# Patient Record
Sex: FEMALE | Race: WHITE | Employment: OTHER | URBAN - METROPOLITAN AREA
[De-identification: names, ages, dates, MRNs, and addresses within clinical notes are randomized per-mention and may not be internally consistent; named-entity substitution may affect disease eponyms.]

---

## 2017-01-18 ENCOUNTER — HOSPITAL ENCOUNTER (OUTPATIENT)
Dept: LAB | Age: 82
Discharge: HOME | End: 2017-01-18
Attending: INTERNAL MEDICINE
Payer: MEDICARE

## 2017-01-18 DIAGNOSIS — E78.2 MIXED HYPERLIPIDEMIA: ICD-10-CM

## 2017-01-18 LAB
ALBUMIN SERPL BCP-MCNC: 3.8 G/DL (ref 3.2–4.6)
ALBUMIN/GLOB SERPL: 1 {RATIO}
ALP SERPL-CCNC: 51 U/L (ref 50–136)
ALT SERPL-CCNC: 22 U/L (ref 12–65)
AST SERPL W P-5'-P-CCNC: 15 U/L (ref 15–37)
BILIRUB DIRECT SERPL-MCNC: <0.1 MG/DL
BILIRUB SERPL-MCNC: 0.4 MG/DL (ref 0.2–1.1)
CHOLEST SERPL-MCNC: 115 MG/DL
GLOBULIN SER CALC-MCNC: 4 G/DL
HDLC SERPL-MCNC: 37 MG/DL (ref 40–60)
HDLC SERPL: 3.1 {RATIO}
LDLC SERPL CALC-MCNC: 31 MG/DL
LIPID PROFILE,FLP: ABNORMAL
PROT SERPL-MCNC: 7.8 G/DL (ref 6.3–8.2)
TRIGL SERPL-MCNC: 235 MG/DL (ref 35–150)
VLDLC SERPL CALC-MCNC: 47 MG/DL

## 2017-01-18 PROCEDURE — 80076 HEPATIC FUNCTION PANEL: CPT | Performed by: INTERNAL MEDICINE

## 2017-01-18 PROCEDURE — 80061 LIPID PANEL: CPT | Performed by: INTERNAL MEDICINE

## 2017-01-18 PROCEDURE — 36415 COLL VENOUS BLD VENIPUNCTURE: CPT | Performed by: INTERNAL MEDICINE

## 2017-08-21 ENCOUNTER — APPOINTMENT (OUTPATIENT)
Dept: GENERAL RADIOLOGY | Age: 82
End: 2017-08-21
Attending: EMERGENCY MEDICINE
Payer: MEDICARE

## 2017-08-21 ENCOUNTER — HOSPITAL ENCOUNTER (EMERGENCY)
Age: 82
Discharge: HOME | End: 2017-08-21
Attending: EMERGENCY MEDICINE
Payer: MEDICARE

## 2017-08-21 VITALS
HEIGHT: 63 IN | DIASTOLIC BLOOD PRESSURE: 58 MMHG | RESPIRATION RATE: 26 BRPM | WEIGHT: 143 LBS | OXYGEN SATURATION: 99 % | HEART RATE: 107 BPM | TEMPERATURE: 98.7 F | SYSTOLIC BLOOD PRESSURE: 104 MMHG | BODY MASS INDEX: 25.34 KG/M2

## 2017-08-21 DIAGNOSIS — J81.0 ACUTE PULMONARY EDEMA (HCC): Primary | ICD-10-CM

## 2017-08-21 DIAGNOSIS — E87.5 ACUTE HYPERKALEMIA: ICD-10-CM

## 2017-08-21 DIAGNOSIS — N18.9 CHRONIC RENAL FAILURE, UNSPECIFIED STAGE: ICD-10-CM

## 2017-08-21 DIAGNOSIS — I48.0 PAROXYSMAL ATRIAL FIBRILLATION (HCC): ICD-10-CM

## 2017-08-21 LAB
ANION GAP SERPL CALC-SCNC: 12 MMOL/L (ref 7–16)
ATRIAL RATE: 141 BPM
BASOPHILS # BLD: 0 K/UL (ref 0–0.2)
BASOPHILS NFR BLD: 0 % (ref 0–2)
BUN SERPL-MCNC: 45 MG/DL (ref 8–23)
CALCIUM SERPL-MCNC: 9.6 MG/DL (ref 8.3–10.4)
CALCULATED R AXIS, ECG10: 51 DEGREES
CALCULATED T AXIS, ECG11: -136 DEGREES
CHLORIDE SERPL-SCNC: 98 MMOL/L (ref 98–107)
CO2 SERPL-SCNC: 27 MMOL/L (ref 21–32)
CREAT SERPL-MCNC: 9.48 MG/DL (ref 0.6–1)
DIAGNOSIS, 93000: NORMAL
DIFFERENTIAL METHOD BLD: ABNORMAL
EOSINOPHIL # BLD: 0 K/UL (ref 0–0.8)
EOSINOPHIL NFR BLD: 1 % (ref 0.5–7.8)
ERYTHROCYTE [DISTWIDTH] IN BLOOD BY AUTOMATED COUNT: 17.9 % (ref 11.9–14.6)
GLUCOSE SERPL-MCNC: 133 MG/DL (ref 65–100)
HCT VFR BLD AUTO: 24.5 % (ref 35.8–46.3)
HGB BLD-MCNC: 8.1 G/DL (ref 11.7–15.4)
IMM GRANULOCYTES # BLD: 0 K/UL (ref 0–0.5)
IMM GRANULOCYTES NFR BLD: 0.7 % (ref 0–5)
LYMPHOCYTES # BLD: 1.4 K/UL (ref 0.5–4.6)
LYMPHOCYTES NFR BLD: 23 % (ref 13–44)
MAGNESIUM SERPL-MCNC: 2.1 MG/DL (ref 1.8–2.4)
MCH RBC QN AUTO: 36.7 PG (ref 26.1–32.9)
MCHC RBC AUTO-ENTMCNC: 33.1 G/DL (ref 31.4–35)
MCV RBC AUTO: 110.9 FL (ref 79.6–97.8)
MONOCYTES # BLD: 0.2 K/UL (ref 0.1–1.3)
MONOCYTES NFR BLD: 4 % (ref 4–12)
NEUTS SEG # BLD: 4.4 K/UL (ref 1.7–8.2)
NEUTS SEG NFR BLD: 71 % (ref 43–78)
PLATELET # BLD AUTO: 236 K/UL (ref 150–450)
PMV BLD AUTO: 10.2 FL (ref 10.8–14.1)
POTASSIUM SERPL-SCNC: 5.4 MMOL/L (ref 3.5–5.1)
Q-T INTERVAL, ECG07: 306 MS
QRS DURATION, ECG06: 64 MS
QTC CALCULATION (BEZET), ECG08: 402 MS
RBC # BLD AUTO: 2.21 M/UL (ref 4.05–5.25)
SODIUM SERPL-SCNC: 137 MMOL/L (ref 136–145)
TROPONIN I BLD-MCNC: 0.16 NG/ML (ref 0–0.08)
VENTRICULAR RATE, ECG03: 104 BPM
WBC # BLD AUTO: 6.2 K/UL (ref 4.3–11.1)

## 2017-08-21 PROCEDURE — 93005 ELECTROCARDIOGRAM TRACING: CPT | Performed by: EMERGENCY MEDICINE

## 2017-08-21 PROCEDURE — 99284 EMERGENCY DEPT VISIT MOD MDM: CPT | Performed by: EMERGENCY MEDICINE

## 2017-08-21 PROCEDURE — 85025 COMPLETE CBC W/AUTO DIFF WBC: CPT | Performed by: EMERGENCY MEDICINE

## 2017-08-21 PROCEDURE — 74011000250 HC RX REV CODE- 250: Performed by: EMERGENCY MEDICINE

## 2017-08-21 PROCEDURE — 80048 BASIC METABOLIC PNL TOTAL CA: CPT | Performed by: EMERGENCY MEDICINE

## 2017-08-21 PROCEDURE — 83735 ASSAY OF MAGNESIUM: CPT | Performed by: EMERGENCY MEDICINE

## 2017-08-21 PROCEDURE — 71010 XR CHEST PORT: CPT

## 2017-08-21 PROCEDURE — 84484 ASSAY OF TROPONIN QUANT: CPT

## 2017-08-21 RX ORDER — LIDOCAINE AND PRILOCAINE 25; 25 MG/G; MG/G
CREAM TOPICAL
Status: COMPLETED | OUTPATIENT
Start: 2017-08-21 | End: 2017-08-21

## 2017-08-21 RX ADMIN — LIDOCAINE AND PRILOCAINE: 25; 25 CREAM TOPICAL at 12:52

## 2017-08-21 NOTE — DISCHARGE INSTRUCTIONS
Hyperkalemia: Care Instructions  Your Care Instructions  Hyperkalemia is too much potassium in the blood. Potassium helps keep the right mix of fluids in your body. It also helps your nerves and muscles work as they should. And it keeps your heartbeat in a normal rhythm. Some things can raise potassium levels. These include some health problems, medicines, and kidney problems. (Normally, your kidneys remove extra potassium.)  Too much potassium can cause nausea. It also can cause a heartbeat that isn't normal. But you may not have any symptoms. Too much potassium can be dangerous. That's why it's important to treat it. If you are taking any of the medicines that can raise your levels, your doctor will ask you to stop. You may get medicines to lower your levels. And you may have to limit or not eat foods that have a lot of potassium. Follow-up care is a key part of your treatment and safety. Be sure to make and go to all appointments, and call your doctor if you are having problems. It's also a good idea to know your test results and keep a list of the medicines you take. How can you care for yourself at home? · Take your medicines exactly as prescribed. Call your doctor if you think you are having a problem with your medicine. · Stop taking certain medicines if your doctor asks you to. They may be causing your high potassium levels. If you have concerns about stopping medicine, talk with your doctor. · If you have kidney, heart, or liver disease and have to limit fluids, talk with your doctor before you increase the amount of fluids you drink. If the doctor says it's okay, drink plenty of fluids. This means drinking enough so that your urine is light yellow or clear like water. · Avoid strenuous exercise until your doctor tells you it is okay. · Potassium is in many foods, including vegetables, fruits, and milk products.  Foods high in potassium include bananas, cantaloupe, broccoli, milk, potatoes, and tomatoes. · Low potassium foods include blueberries, raspberries, cucumber, white or brown rice, spaghetti, and macaroni. · Do not use a salt substitute without talking to your doctor first. Most of these are very high in potassium. · Be sure to tell your doctor about any prescription, over-the-counter, or herbal medicines you take. Some of these can raise potassium. When should you call for help? Call 911 anytime you think you may need emergency care. For example, call if:  · You passed out (lost consciousness). · You have trouble breathing. · You have an unusual heartbeat. Your heart may beat fast or skip beats. Call your doctor now or seek immediate medical care if:  · You have trouble urinating or can urinate only very small amounts. · Your nausea does not get better. Watch closely for changes in your health, and be sure to contact your doctor if:  · You do not get better as expected. · You want more help planning meals. Where can you learn more? Go to http://mello-amber.info/. Enter V362 in the search box to learn more about \"Hyperkalemia: Care Instructions. \"  Current as of: August 8, 2016  Content Version: 11.3  © 1853-8943 [employer]. Care instructions adapted under license by [employer] (which disclaims liability or warranty for this information). If you have questions about a medical condition or this instruction, always ask your healthcare professional. [place] [address] any warranty or liability for your use of this information. Learning About Fluid Overload  What is fluid overload? Fluid overload means that your body has too much water. The extra fluid in your body can raise your blood pressure and force your heart to work harder. It can also make it hard for you to breathe. Most of your body is made up of water.  The body uses minerals like sodium and potassium to help organs such as your heart, kidneys, and liver balance how much water you need. For example, the heart pumps blood to move water around the body. And the kidneys work to get rid of the water that the body doesn't need. Health conditions like kidney disease, heart failure, and cirrhosis can cause fluid overload. Other things can cause extra fluid to build up. IV fluids, some medicines, too much salt (sodium) from food, and certain medical treatments can sometimes cause this fluid increase. What are the symptoms? Some of the most common symptoms are:  · Gaining weight over a short period of time. · Swelling in the ankles or legs. · Shortness of breath. How is it treated? The goal of treatment is to remove the extra fluid in your body. Your treatment will depend on the cause. Your doctor may:  · Give you medicines, such as diuretics (also called \"water pills\"). They help your body get rid of the extra fluid. · Restrict your fluid or salt intake. Follow-up care is a key part of your treatment and safety. Be sure to make and go to all appointments, and call your doctor if you are having problems. It's also a good idea to know your test results and keep a list of the medicines you take. Where can you learn more? Go to http://mello-amber.info/. Enter O110 in the search box to learn more about \"Learning About Fluid Overload. \"  Current as of: January 12, 2017  Content Version: 11.3  © 0128-8036 [employer]. Care instructions adapted under license by [email or web address] (which disclaims liability or warranty for this information). If you have questions about a medical condition or this instruction, always ask your healthcare professional. [place] [address] any warranty or liability for your use of this information.

## 2017-08-21 NOTE — ED PROVIDER NOTES
HPI Comments: 79-year-old white female with history of end-stage renal disease requiring hemodialysis as well as chronic A. Fib presents with general weakness and shortness of breath for the past week. She states it seems to have worsened since dialysis on Friday. No chest pain. She has had cough. No nausea, vomiting, fever. Family reports the patient does not like doing her dialysis. Patient is a 80 y.o. female presenting with palpitations. The history is provided by the patient. Palpitations    Associated symptoms include weakness, cough and shortness of breath. Pertinent negatives include no fever, no chest pain, no abdominal pain, no nausea, no vomiting, no headaches and no back pain. Past Medical History:   Diagnosis Date    Abdominal pain, chronic, right lower quadrant 5/16/2013    Anticoagulated on Coumadin     was told to hold for 5 days- held as 10/15/16    Aortic stenosis 06/26/2015    mild to moderate per echo     Arthritis     minimal    Atrial fibrillation, chronic ([address] 1/14/2016    CAD (coronary artery disease) 06/2013    mild MI     Carotid stenosis without Infarct 1/19/2016    Chronic anemia 6/26/2013    Chronic kidney disease     ESRD stage 4, M, W, F dialysis. Michelle Arthur  in  [facility]    Chronic renal failure 1/19/2016    Crohn's disease ([address]     colectomy    Dyslipidemia 6/26/2013    Edema 1/19/2016    ESRD (end stage renal disease) on dialysis ([address] 05/16/2013    right arm functioning- fistula----M-W-F AT [address] [facility]    Former smoker     GERD (gastroesophageal reflux disease)     controlled on meds takes prilosec    Gout 6/26/2013    History of ileostomy 5/16/2013    History of peritonitis 2013    dialysis associated    History of sepsis 2009    Hypercholesteremia     Hyperlipidemia 1/19/2016    Hypertension      well controlled by medication    Hypokalemia 5/16/2013    Hypomagnesemia 6/23/2009    Ileostomy in place ([address]     Intractable nausea and vomiting 5/16/2013    Leukocytosis 5/16/2013    NSTEMI (non-ST elevated myocardial infarction) [facility] 6/26/2013    Palpitations 1/19/2016    Paroxysmal atrial fibrillation ([facility] 1/19/2016    [facility] tachycardia [facility] 1/19/2016    S/P AAA repair     \" aneurysm in my stomach repaired\"    Serum lipase elevation 5/16/2013    Tricuspid regurgitation EF 55-60%    moderate to severe per echo 6/26/15       Past Surgical History:   Procedure Laterality Date    ABDOMEN SURGERY PROC UNLISTED      insertion and removal of infected PD cath    COLONOSCOPY N/A 10/20/2016    ILEOSCOPY THROUGH OSTOMY/ 25 performed by Hazel Clark MD at [address] HX AAA REPAIR  2013    HX BREAST BIOPSY Bilateral       x3 on left breast    HX CATARACT REMOVAL Bilateral 2005    wit IOL    HX COLECTOMY  1983    colon removed / ileostomy    HX LAP CHOLECYSTECTOMY  2013    VASCULAR SURGERY PROCEDURE UNLIST Left 2009    fistulagram 8/09, LUE    VASCULAR SURGERY PROCEDURE UNLIST      7/09 L subclavian hemodialysis cath    VASCULAR SURGERY PROCEDURE UNLIST Right 2014    fistula         Family History:   Problem Relation Age of Onset    Cancer Mother     Stroke Father     Hypertension Father     Heart Disease Brother     Heart Disease Brother     Stroke Sister     Hypertension Sister        Social History     Social History    Marital status:      Spouse name: N/A    Number of children: N/A    Years of education: N/A     Occupational History    Not on file. Social History Main Topics    Smoking status: Former Smoker     Packs/day: 0.25     Years: 5.00     Quit date: 8/22/1971    Smokeless tobacco: Never Used      Comment: quit years ago    Alcohol use No    Drug use: No    Sexual activity: Not on file     Other Topics Concern    Not on file     Social History Narrative         ALLERGIES: Adhesive    Review of Systems   Constitutional: Negative for fever. HENT: Negative for congestion.     Respiratory: Positive for cough and shortness of breath. Cardiovascular: Positive for palpitations. Negative for chest pain. Gastrointestinal: Negative for abdominal pain, nausea and vomiting. Genitourinary: Negative for dysuria. Musculoskeletal: Negative for back pain and neck pain. Skin: Negative for rash. Neurological: Positive for weakness. Negative for headaches. Vitals:    08/21/17 1023   BP: 138/64   Pulse: (!) 108   Resp: 18   Temp: 98.5 °F (36.9 °C)   SpO2: 98%   Weight: 64.9 kg (143 lb)   Height: 5' 3\" (1.6 m)            Physical Exam   Constitutional: She is oriented to person, place, and time. She appears well-developed and well-nourished. No distress. HENT:   Head: Normocephalic and atraumatic. Mouth/Throat: Oropharynx is clear and moist.   Eyes: Conjunctivae are normal. Pupils are equal, round, and reactive to light. Neck: Normal range of motion. Neck supple. Cardiovascular: An irregularly irregular rhythm present. Tachycardia present. Pulmonary/Chest: Effort normal. She has no wheezes. She has rales. Abdominal: Soft. She exhibits no distension. There is no tenderness. Musculoskeletal: Normal range of motion. She exhibits no edema. Neurological: She is alert and oriented to person, place, and time. Skin: Skin is warm and dry. No rash noted. Psychiatric: She has a normal mood and affect. Nursing note and vitals reviewed. MDM  Number of Diagnoses or Management Options  Diagnosis management comments: Lab work reveals anemia with a hemoglobin 8.1 and hematocrit 24.5. This is only slightly lower than her baseline. Basic panel shows creatinine 9.4 and BUNs 45. Potassium is 5.4 without hemolysis. EKG shows inverted T waves inferior laterally and A. Fib rate 104. No peaked T waves. Chest x-ray shows cardiomegaly with pulmonary edema and small effusions. Troponin is mildly elevated 0.16.  No recent values for comparison however in 2013 her troponin appeared to be persistently elevated. This is likely due to underlying renal disease. Clinically the patient appears comfortable. She is requesting to be discharged so she can follow up for dialysis today. We did contact the clinic who states that they can run her today.        Amount and/or Complexity of Data Reviewed  Clinical lab tests: ordered and reviewed  Tests in the radiology section of CPT®: ordered and reviewed  Independent visualization of images, tracings, or specimens: yes    Risk of Complications, Morbidity, and/or Mortality  Presenting problems: moderate  Diagnostic procedures: moderate  Management options: moderate      ED Course       Procedures

## 2017-08-21 NOTE — ED TRIAGE NOTES
Pt arrives via EMS with complaints of palpitations since dialysis on Friday.  Pt does have hx of a fib  Due for dialysis today at [facility], EMS states all other vital signs were stable

## 2017-08-22 ENCOUNTER — PATIENT OUTREACH (OUTPATIENT)
Dept: CASE MANAGEMENT | Age: 82
End: 2017-08-22

## 2017-08-22 NOTE — PROGRESS NOTES
This note will not be viewable in [address] [facility]  Initial outreach attempt unsuccessful. Left message for call back. Will attempt 2nd outreach within 24 hrs.

## 2017-08-24 ENCOUNTER — PATIENT OUTREACH (OUTPATIENT)
Dept: CASE MANAGEMENT | Age: 82
End: 2017-08-24

## 2017-08-24 NOTE — PROGRESS NOTES
This note will not be viewable in [address] [facility]  Left message to call back. 2nd outreach attempt unsuccessful. Will schedule 3rd outreach attempt within 5 business days.

## 2017-08-29 ENCOUNTER — PATIENT OUTREACH (OUTPATIENT)
Dept: CASE MANAGEMENT | Age: 82
End: 2017-08-29

## 2017-08-29 NOTE — PROGRESS NOTES
This note will not be viewable in [address] [facility]  Final outreach call unsuccessful. Unable to reach patient after several attempts. Will close case at this time. Will reopen case if return call is received.

## 2018-07-30 ENCOUNTER — APPOINTMENT (OUTPATIENT)
Dept: ULTRASOUND IMAGING | Age: 83
DRG: 186 | End: 2018-07-30
Attending: HOSPITALIST
Payer: MEDICARE

## 2018-07-30 ENCOUNTER — APPOINTMENT (OUTPATIENT)
Dept: CT IMAGING | Age: 83
DRG: 186 | End: 2018-07-30
Attending: HOSPITALIST
Payer: MEDICARE

## 2018-07-30 ENCOUNTER — HOSPITAL ENCOUNTER (INPATIENT)
Age: 83
LOS: 3 days | Discharge: HOME HEALTH | DRG: 186 | End: 2018-08-02
Attending: EMERGENCY MEDICINE | Admitting: HOSPITALIST
Payer: MEDICARE

## 2018-07-30 ENCOUNTER — APPOINTMENT (OUTPATIENT)
Dept: GENERAL RADIOLOGY | Age: 83
DRG: 186 | End: 2018-07-30
Attending: EMERGENCY MEDICINE
Payer: MEDICARE

## 2018-07-30 DIAGNOSIS — J90 PLEURAL EFFUSION, LEFT: ICD-10-CM

## 2018-07-30 DIAGNOSIS — Z98.890 HISTORY OF ILEOSTOMY: Chronic | ICD-10-CM

## 2018-07-30 DIAGNOSIS — N18.6 ESRD (END STAGE RENAL DISEASE) ON DIALYSIS (HCC): ICD-10-CM

## 2018-07-30 DIAGNOSIS — R06.02 SHORTNESS OF BREATH: ICD-10-CM

## 2018-07-30 DIAGNOSIS — Z99.2 ESRD (END STAGE RENAL DISEASE) ON DIALYSIS (HCC): ICD-10-CM

## 2018-07-30 DIAGNOSIS — I48.0 PAROXYSMAL ATRIAL FIBRILLATION (HCC): ICD-10-CM

## 2018-07-30 DIAGNOSIS — J90 PLEURAL EFFUSION, RIGHT: ICD-10-CM

## 2018-07-30 DIAGNOSIS — J81.0 ACUTE PULMONARY EDEMA (HCC): Primary | ICD-10-CM

## 2018-07-30 DIAGNOSIS — I10 HYPERTENSION, BENIGN: ICD-10-CM

## 2018-07-30 DIAGNOSIS — J90 PLEURAL EFFUSION, BILATERAL: ICD-10-CM

## 2018-07-30 PROBLEM — J81.1 PULMONARY EDEMA: Status: ACTIVE | Noted: 2018-07-30

## 2018-07-30 LAB
ALBUMIN SERPL-MCNC: 4.1 G/DL (ref 3.2–4.6)
ALBUMIN/GLOB SERPL: 1 {RATIO} (ref 1.2–3.5)
ALP SERPL-CCNC: 78 U/L (ref 50–136)
ALT SERPL-CCNC: 20 U/L (ref 12–65)
ANION GAP SERPL CALC-SCNC: 12 MMOL/L (ref 7–16)
AST SERPL-CCNC: 27 U/L (ref 15–37)
ATRIAL RATE: 92 BPM
BASOPHILS # BLD: 0 K/UL (ref 0–0.2)
BASOPHILS NFR BLD: 0 % (ref 0–2)
BILIRUB SERPL-MCNC: 0.7 MG/DL (ref 0.2–1.1)
BNP SERPL-MCNC: 262 PG/ML
BUN SERPL-MCNC: 31 MG/DL (ref 8–23)
CALCIUM SERPL-MCNC: 8.2 MG/DL (ref 8.3–10.4)
CALCULATED R AXIS, ECG10: 76 DEGREES
CALCULATED T AXIS, ECG11: -111 DEGREES
CHLORIDE SERPL-SCNC: 101 MMOL/L (ref 98–107)
CO2 SERPL-SCNC: 24 MMOL/L (ref 21–32)
CREAT SERPL-MCNC: 9.02 MG/DL (ref 0.6–1)
D DIMER PPP FEU-MCNC: 1.4 UG/ML(FEU)
DIAGNOSIS, 93000: NORMAL
DIFFERENTIAL METHOD BLD: ABNORMAL
EOSINOPHIL # BLD: 0 K/UL (ref 0–0.8)
EOSINOPHIL NFR BLD: 0 % (ref 0.5–7.8)
ERYTHROCYTE [DISTWIDTH] IN BLOOD BY AUTOMATED COUNT: 19.5 % (ref 11.9–14.6)
GLOBULIN SER CALC-MCNC: 4.2 G/DL (ref 2.3–3.5)
GLUCOSE SERPL-MCNC: 155 MG/DL (ref 65–100)
HCT VFR BLD AUTO: 39.7 % (ref 35.8–46.3)
HGB BLD-MCNC: 12.8 G/DL (ref 11.7–15.4)
IMM GRANULOCYTES # BLD: 0 K/UL (ref 0–0.5)
IMM GRANULOCYTES NFR BLD AUTO: 0 % (ref 0–5)
LYMPHOCYTES # BLD: 0.8 K/UL (ref 0.5–4.6)
LYMPHOCYTES NFR BLD: 12 % (ref 13–44)
MCH RBC QN AUTO: 36.2 PG (ref 26.1–32.9)
MCHC RBC AUTO-ENTMCNC: 32.2 G/DL (ref 31.4–35)
MCV RBC AUTO: 112.1 FL (ref 79.6–97.8)
MONOCYTES # BLD: 0.3 K/UL (ref 0.1–1.3)
MONOCYTES NFR BLD: 4 % (ref 4–12)
NEUTS SEG # BLD: 5.6 K/UL (ref 1.7–8.2)
NEUTS SEG NFR BLD: 84 % (ref 43–78)
PLATELET # BLD AUTO: 263 K/UL (ref 150–450)
PMV BLD AUTO: 10.2 FL (ref 10.8–14.1)
POTASSIUM SERPL-SCNC: 5.9 MMOL/L (ref 3.5–5.1)
PROT SERPL-MCNC: 8.3 G/DL (ref 6.3–8.2)
Q-T INTERVAL, ECG07: 446 MS
QRS DURATION, ECG06: 122 MS
QTC CALCULATION (BEZET), ECG08: 548 MS
RBC # BLD AUTO: 3.54 M/UL (ref 4.05–5.25)
SODIUM SERPL-SCNC: 137 MMOL/L (ref 136–145)
TROPONIN I SERPL-MCNC: 0.08 NG/ML (ref 0.02–0.05)
VENTRICULAR RATE, ECG03: 91 BPM
WBC # BLD AUTO: 6.8 K/UL (ref 4.3–11.1)

## 2018-07-30 PROCEDURE — 71045 X-RAY EXAM CHEST 1 VIEW: CPT

## 2018-07-30 PROCEDURE — 74011250637 HC RX REV CODE- 250/637: Performed by: EMERGENCY MEDICINE

## 2018-07-30 PROCEDURE — 71260 CT THORAX DX C+: CPT

## 2018-07-30 PROCEDURE — 93970 EXTREMITY STUDY: CPT

## 2018-07-30 PROCEDURE — 74011250637 HC RX REV CODE- 250/637: Performed by: INTERNAL MEDICINE

## 2018-07-30 PROCEDURE — 90935 HEMODIALYSIS ONE EVALUATION: CPT

## 2018-07-30 PROCEDURE — 80053 COMPREHEN METABOLIC PANEL: CPT | Performed by: EMERGENCY MEDICINE

## 2018-07-30 PROCEDURE — 36415 COLL VENOUS BLD VENIPUNCTURE: CPT | Performed by: HOSPITALIST

## 2018-07-30 PROCEDURE — 93005 ELECTROCARDIOGRAM TRACING: CPT | Performed by: EMERGENCY MEDICINE

## 2018-07-30 PROCEDURE — 74011250636 HC RX REV CODE- 250/636: Performed by: EMERGENCY MEDICINE

## 2018-07-30 PROCEDURE — 74011636320 HC RX REV CODE- 636/320: Performed by: EMERGENCY MEDICINE

## 2018-07-30 PROCEDURE — 83880 ASSAY OF NATRIURETIC PEPTIDE: CPT | Performed by: EMERGENCY MEDICINE

## 2018-07-30 PROCEDURE — 74011250637 HC RX REV CODE- 250/637: Performed by: HOSPITALIST

## 2018-07-30 PROCEDURE — 84484 ASSAY OF TROPONIN QUANT: CPT | Performed by: EMERGENCY MEDICINE

## 2018-07-30 PROCEDURE — 85379 FIBRIN DEGRADATION QUANT: CPT | Performed by: HOSPITALIST

## 2018-07-30 PROCEDURE — 85025 COMPLETE CBC W/AUTO DIFF WBC: CPT | Performed by: EMERGENCY MEDICINE

## 2018-07-30 PROCEDURE — 74011250636 HC RX REV CODE- 250/636: Performed by: HOSPITALIST

## 2018-07-30 PROCEDURE — 65660000000 HC RM CCU STEPDOWN

## 2018-07-30 PROCEDURE — 96374 THER/PROPH/DIAG INJ IV PUSH: CPT | Performed by: EMERGENCY MEDICINE

## 2018-07-30 PROCEDURE — 99284 EMERGENCY DEPT VISIT MOD MDM: CPT | Performed by: EMERGENCY MEDICINE

## 2018-07-30 PROCEDURE — 5A1D70Z PERFORMANCE OF URINARY FILTRATION, INTERMITTENT, LESS THAN 6 HOURS PER DAY: ICD-10-PCS | Performed by: EMERGENCY MEDICINE

## 2018-07-30 PROCEDURE — 74011000258 HC RX REV CODE- 258: Performed by: EMERGENCY MEDICINE

## 2018-07-30 RX ORDER — FUROSEMIDE 10 MG/ML
40 INJECTION INTRAMUSCULAR; INTRAVENOUS
Status: COMPLETED | OUTPATIENT
Start: 2018-07-30 | End: 2018-07-30

## 2018-07-30 RX ORDER — SEVELAMER CARBONATE 800 MG/1
1600 TABLET, FILM COATED ORAL
Status: DISCONTINUED | OUTPATIENT
Start: 2018-07-30 | End: 2018-08-02 | Stop reason: HOSPADM

## 2018-07-30 RX ORDER — DILTIAZEM HYDROCHLORIDE 120 MG/1
120 CAPSULE, COATED, EXTENDED RELEASE ORAL DAILY
Status: DISCONTINUED | OUTPATIENT
Start: 2018-07-31 | End: 2018-08-01

## 2018-07-30 RX ORDER — CARVEDILOL 25 MG/1
25 TABLET ORAL 2 TIMES DAILY WITH MEALS
Status: DISCONTINUED | OUTPATIENT
Start: 2018-07-30 | End: 2018-08-01

## 2018-07-30 RX ORDER — HEPARIN SODIUM 5000 [USP'U]/ML
5000 INJECTION, SOLUTION INTRAVENOUS; SUBCUTANEOUS EVERY 8 HOURS
Status: DISCONTINUED | OUTPATIENT
Start: 2018-07-30 | End: 2018-08-02 | Stop reason: HOSPADM

## 2018-07-30 RX ORDER — HYDRALAZINE HYDROCHLORIDE 20 MG/ML
10 INJECTION INTRAMUSCULAR; INTRAVENOUS
Status: DISCONTINUED | OUTPATIENT
Start: 2018-07-30 | End: 2018-08-02 | Stop reason: HOSPADM

## 2018-07-30 RX ORDER — SODIUM CHLORIDE 0.9 % (FLUSH) 0.9 %
5-10 SYRINGE (ML) INJECTION EVERY 8 HOURS
Status: DISCONTINUED | OUTPATIENT
Start: 2018-07-30 | End: 2018-08-02 | Stop reason: HOSPADM

## 2018-07-30 RX ORDER — ACETAMINOPHEN 325 MG/1
650 TABLET ORAL
Status: DISCONTINUED | OUTPATIENT
Start: 2018-07-30 | End: 2018-08-02 | Stop reason: HOSPADM

## 2018-07-30 RX ORDER — SODIUM CHLORIDE 0.9 % (FLUSH) 0.9 %
10 SYRINGE (ML) INJECTION
Status: COMPLETED | OUTPATIENT
Start: 2018-07-30 | End: 2018-07-30

## 2018-07-30 RX ORDER — METOPROLOL TARTRATE 25 MG/1
25 TABLET, FILM COATED ORAL
Status: COMPLETED | OUTPATIENT
Start: 2018-07-30 | End: 2018-07-30

## 2018-07-30 RX ORDER — PANTOPRAZOLE SODIUM 40 MG/1
40 TABLET, DELAYED RELEASE ORAL
Status: DISCONTINUED | OUTPATIENT
Start: 2018-07-31 | End: 2018-08-02 | Stop reason: HOSPADM

## 2018-07-30 RX ORDER — ASPIRIN 81 MG/1
81 TABLET ORAL DAILY
Status: DISCONTINUED | OUTPATIENT
Start: 2018-07-31 | End: 2018-08-02 | Stop reason: HOSPADM

## 2018-07-30 RX ORDER — SODIUM CHLORIDE 0.9 % (FLUSH) 0.9 %
5-10 SYRINGE (ML) INJECTION AS NEEDED
Status: DISCONTINUED | OUTPATIENT
Start: 2018-07-30 | End: 2018-08-02 | Stop reason: HOSPADM

## 2018-07-30 RX ORDER — ZOLPIDEM TARTRATE 5 MG/1
5 TABLET ORAL
Status: DISCONTINUED | OUTPATIENT
Start: 2018-07-30 | End: 2018-08-01 | Stop reason: SDUPTHER

## 2018-07-30 RX ADMIN — SODIUM CHLORIDE 100 ML: 900 INJECTION, SOLUTION INTRAVENOUS at 21:15

## 2018-07-30 RX ADMIN — Medication 10 ML: at 21:15

## 2018-07-30 RX ADMIN — CARVEDILOL 25 MG: 25 TABLET, FILM COATED ORAL at 17:37

## 2018-07-30 RX ADMIN — FUROSEMIDE 40 MG: 10 INJECTION, SOLUTION INTRAMUSCULAR; INTRAVENOUS at 11:59

## 2018-07-30 RX ADMIN — NITROGLYCERIN 0.5 INCH: 20 OINTMENT TOPICAL at 11:58

## 2018-07-30 RX ADMIN — Medication 1 AMPULE: at 21:29

## 2018-07-30 RX ADMIN — METOPROLOL TARTRATE 25 MG: 25 TABLET ORAL at 15:47

## 2018-07-30 RX ADMIN — ZOLPIDEM TARTRATE 5 MG: 5 TABLET ORAL at 23:54

## 2018-07-30 RX ADMIN — HEPARIN SODIUM 5000 UNITS: 5000 INJECTION INTRAVENOUS; SUBCUTANEOUS at 22:04

## 2018-07-30 RX ADMIN — Medication 5 ML: at 21:30

## 2018-07-30 RX ADMIN — IOPAMIDOL 100 ML: 755 INJECTION, SOLUTION INTRAVENOUS at 21:15

## 2018-07-30 RX ADMIN — SEVELAMER CARBONATE 1600 MG: 800 TABLET, FILM COATED ORAL at 17:37

## 2018-07-30 NOTE — DIALYSIS
Patient c/o severe cramping several times during tx. Resolved with NS bolus and reduction of UF goal. Not able to pull full UF goal on patient.

## 2018-07-30 NOTE — ED TRIAGE NOTES
Pt states having a sudden onset of shortness of breath at 0300 am last night. Pt denies any hx of asthma. Pt is scheduled for dialysis today and has not missed any appointment. Pt states she cannot breath when laying back. Pt denies any pain.

## 2018-07-30 NOTE — IP AVS SNAPSHOT
Maria Rfelisha Alexander 
 
 
 [address] [facility] [address] [facility] 
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 My Medications START taking these medications Instructions Each Dose to Equal  
 Morning Noon Evening Bedtime  
 metoprolol tartrate 50 mg tablet Commonly known as:  LOPRESSOR Take 1 Tab by mouth two (2) times a day. 50 mg CHANGE how you take these medications Instructions Each Dose to Equal  
 Morning Noon Evening Bedtime  
 dilTIAZem  mg ER capsule Commonly known as:  CARDIZEM CD Start taking on:  8/3/2018 What changed:   
- medication strength 
- how much to take Take 1 Cap by mouth daily. 180 mg CONTINUE taking these medications Instructions Each Dose to Equal  
 Morning Noon Evening Bedtime AMBIEN 10 mg tablet Generic drug:  zolpidem Take 5 mg by mouth nightly. Indications: SLEEP-ONSET INSOMNIA  
 5 mg  
    
   
   
   
  
  
 aspirin delayed-release 81 mg tablet Take  by mouth daily. esomeprazole 20 mg capsule Commonly known as:  Zaira Peacock Your next dose is: Take as directed Take  by mouth daily. RENVELA 800 mg Tab tab Generic drug:  sevelamer carbonate Take 1,600 mg by mouth three (3) times daily (with meals). Indications: Renal Osteodystrophy with Hyperphosphatemia  
 1600 mg  
    
   
   
  
   
  
  
STOP taking these medications   
 carvedilol 25 mg tablet Commonly known as:  COREG  
   
  
 diphenhydrAMINE 50 mg tablet Commonly known as:  BENADRYL  
   
  
 lidocaine-prilocaine topical cream  
Commonly known as:  EMLA Where to Get Your Medications Information on where to get these meds will be given to you by the nurse or doctor. ! Ask your nurse or doctor about these medications  
  dilTIAZem  mg ER capsule  
 metoprolol tartrate 50 mg tablet Discharge Instructions DISCHARGE SUMMARY from Nurse PATIENT INSTRUCTIONS: 
 
 
F-face looks uneven A-arms unable to move or move unevenly S-speech slurred or non-existent T-time-call 911 as soon as signs and symptoms begin-DO NOT go Back to bed or wait to see if you get better-TIME IS BRAIN. Warning Signs of HEART ATTACK Call 911 if you have these symptoms: 
? Chest discomfort. Most heart attacks involve discomfort in the center of the chest that lasts more than a few minutes, or that goes away and comes back. It can feel like uncomfortable pressure, squeezing, fullness, or pain. ? Discomfort in other areas of the upper body. Symptoms can include pain or discomfort in one or both arms, the back, neck, jaw, or stomach. ? Shortness of breath with or without chest discomfort. ? Other signs may include breaking out in a cold sweat, nausea, or lightheadedness. Don't wait more than five minutes to call [address]! Fast action can save your life. Calling 911 is almost always the fastest way to get lifesaving treatment. Emergency Medical Services staff can begin treatment when they arrive  up to an hour sooner than if someone gets to the hospital by car. The discharge information has been reviewed with the patient. The patient verbalized understanding. Discharge medications reviewed with the patient and appropriate educational materials and side effects teaching were provided. ___________________________________________________________________________________________________________________________________ Shortness of Breath: Care Instructions Your Care Instructions Shortness of breath has many causes. Sometimes conditions such as anxiety can lead to shortness of breath. Some people get mild shortness of breath when they exercise. Trouble breathing also can be a symptom of a serious problem, such as asthma, lung disease, emphysema, heart problems, and pneumonia. If your shortness of breath continues, you may need tests and treatment. Watch for any changes in your breathing and other symptoms. Follow-up care is a key part of your treatment and safety. Be sure to make and go to all appointments, and call your doctor if you are having problems. It's also a good idea to know your test results and keep a list of the medicines you take. How can you care for yourself at home? · Do not smoke or allow others to smoke around you. If you need help quitting, talk to your doctor about stop-smoking programs and medicines. These can increase your chances of quitting for good. · Get plenty of rest and sleep. · Take your medicines exactly as prescribed.  Call your doctor if you think you are having a problem with your medicine. · Find healthy ways to deal with stress. ¨ Exercise daily. ¨ Get plenty of sleep. ¨ Eat regularly and well. When should you call for help? Call 911 anytime you think you may need emergency care. For example, call if: 
  · You have severe shortness of breath.  
  · You have symptoms of a heart attack. These may include: ¨ Chest pain or pressure, or a strange feeling in the chest. 
¨ Sweating. ¨ Shortness of breath. ¨ Nausea or vomiting. ¨ Pain, pressure, or a strange feeling in the back, neck, jaw, or upper belly or in one or both shoulders or arms. ¨ Lightheadedness or sudden weakness. ¨ A fast or irregular heartbeat. After you call 911, the  may tell you to chew 1 adult-strength or 2 to 4 low-dose aspirin. Wait for an ambulance. Do not try to drive yourself.  
 Call your doctor now or seek immediate medical care if: 
  · Your shortness of breath gets worse or you start to wheeze. Wheezing is a high-pitched sound when you breathe.  
  · You wake up at night out of breath or have to prop your head up on several pillows to breathe.  
  · You are short of breath after only light activity or while at rest.  
 Watch closely for changes in your health, and be sure to contact your doctor if: 
  · You do not get better over the next 1 to 2 days. Where can you learn more? Go to http://mello-amber.info/. Enter S780 in the search box to learn more about \"Shortness of Breath: Care Instructions. \" Current as of: December 6, 2017 Content Version: 11.7 © 1312-7339 [employer]. Care instructions adapted under license by "[employer] (which disclaims liability or warranty for this information). If you have questions about a medical condition or this instruction, always ask your healthcare professional. [place] [address] any warranty or liability for your use of this information. ACO Transitions of Care Introducing [employer] [address] Samantha Becker offers a voluntary care coordination program to provide high quality service and care to [facility] fee-for-service beneficiaries. Kendrick Jiménez was designed to help you enhance your health and well-being through the following services: ? Transitions of Care  support for individuals who are transitioning from one care setting to another (example: Hospital to home). ? Chronic and Complex Care Coordination  support for individuals and caregivers of those with serious or chronic illnesses or with more than one chronic (ongoing) condition and those who take a number of different medications. If you meet specific medical criteria, a [address] [place] [address] Rd may call you directly to coordinate your care with your primary care physician and your other care providers. For questions about the [facility] programs, please, contact your physicians office. For general questions or additional information about Accountable Care Organizations: 
Please visit www.medicare.gov/acos. html or call 1-800-MEDICARE (8-366.513.3736) TTY users should call 4-600.142.2479. cottonTracks Announcement We are excited to announce that we are making your provider's discharge notes available to you in cottonTracks. You will see these notes when they are completed and signed by the physician that discharged you from your recent hospital stay. If you have any questions or concerns about any information you see in cottonTracks, please call the Health Information Department where you were seen or reach out to your Primary Care Provider for more information about your plan of care. Introducing [facility]! [facility] introduces cottonTracks patient portal. Now you can access parts of your medical record, email your doctor's office, and request medication refills online. 1. In your internet browser, go to [email or web address] 2. Click on the First Time User? Click Here link in the Sign In box. You will see the New Member Sign Up page. 3. Enter your Radico Access Code exactly as it appears below. You will not need to use this code after youve completed the sign-up process. If you do not sign up before the expiration date, you must request a new code. · Radico Access Code: [email or web address] Expires: 10/8/2018  1:49 PM 
 
4. Enter the last four digits of your Social Security Number (xxxx) and Date of Birth (mm/dd/yyyy) as indicated and click Submit. You will be taken to the next sign-up page. 5. Create a Trony Solart ID. This will be your Radico login ID and cannot be changed, so think of one that is secure and easy to remember. 6. Create a Radico password. You can change your password at any time. 7. Enter your Password Reset Question and Answer. This can be used at a later time if you forget your password. 8. Enter your e-mail address. You will receive e-mail notification when new information is available in [address] 9. Click Sign Up. You can now view and download portions of your medical record. 10. Click the Download Summary menu link to download a portable copy of your medical information. If you have questions, please visit the Frequently Asked Questions section of the Radico website. Remember, Radico is NOT to be used for urgent needs. For medical emergencies, dial 911. Now available from your iPhone and Android! Introducing Garrett Sarmiento As a Galina Shipley patient, I wanted to make you aware of our electronic visit tool called Garrett Deionlorena. Galina Shipley 24/7 allows you to connect within minutes with a medical provider 24 hours a day, seven days a week via a mobile device or tablet or logging into a secure website from your computer. You can access Garrett Sarmiento from anywhere in the [place]. A virtual visit might be right for you when you have a simple condition and feel like you just dont want to get out of bed, or cant get away from work for an appointment, when your regular [facility] provider is not available (evenings, weekends or holidays), or when youre out of town and need minor care. Electronic visits cost only $49 and if the [employer] provider determines a prescription is needed to treat your condition, one can be electronically transmitted to a nearby pharmacy*. Please take a moment to enroll today if you have not already done so. The enrollment process is free and takes just a few minutes. To enroll, please download the [employer] oriana to your tablet or phone, or visit [email or web address] to enroll on your computer. And, as an [address] [place] [address] patient with a [employer] account, the results of your visits will be scanned into your electronic medical record and your primary care provider will be able to view the scanned results. We urge you to continue to see your regular [facility] provider for your ongoing medical care. And while your primary care provider may not be the one available when you seek a Garrett Deionlorena virtual visit, the peace of mind you get from getting a real diagnosis real time can be priceless. For more information on Garrett Deionlorena, view our Frequently Asked Questions (FAQs) at [email or web address]. Sincerely, 
 
Roxanne Kat MD 
Chief Medical Officer [employer] *:  certain medications cannot be prescribed via Garrett Fair and Squarelorena Unresulted Labs-Please follow up with your PCP about these lab tests Order Current Status AFB CULTURE + SMEAR W/RFLX ID FROM CULTURE Preliminary result FUNGUS, CULTURE, MISC SOURCE Preliminary result Providers Seen During Your Hospitalization Provider Specialty Primary office phone Klever Schulte MD Emergency Medicine [phone] Silvestre Garibay MD [facility] Practice [phone] Your Primary Care Physician (PCP) Primary Care Physician Office Phone Office Fax NONE ** None ** ** None ** You are allergic to the following Allergen Reactions Adhesive Other (comments) Skin tears Recent Documentation Height Weight BMI OB Status Smoking Status 1.6 m 63.8 kg 24.91 kg/m2 Postmenopausal Former Smoker Emergency Contacts Name Discharge Info Relation Home Work Mobile Crystal Jurado  Child [2] [phone] Patient Belongings The following personal items are in your possession at time of discharge: 
  Dental Appliances: Uppers, Lowers  Visual Aid: Glasses, With patient      Home Medications: Sent home (sent home with daughter) Please provide this summary of care documentation to your next provider. Signatures-by signing, you are acknowledging that this After Visit Summary has been reviewed with you and you have received a copy. Patient Signature:  ____________________________________________________________ Date:  ____________________________________________________________  
  
Macy  Provider Signature:  ____________________________________________________________ Date:  ____________________________________________________________

## 2018-07-30 NOTE — CONSULTS
[facility] Consultation    Admission Date:  7/30/2018    Admission Diagnosis:  Shortness of breath    History of Present Illness:  Pt is a 81 yo WF h/o ESRD on HD MWF at [address] [facility] [facility] who presented with SOB and pulmonary edema on CXR. She denies any increase in salt or fluid intake this past weekend. Otherwise, no other complaints. Past Medical History:   Diagnosis Date    Abdominal pain, chronic, right lower quadrant 5/16/2013    Anticoagulated on Coumadin     was told to hold for 5 days- held as 10/15/16    Aortic stenosis 06/26/2015    mild to moderate per echo     Arthritis     minimal    Atrial fibrillation, chronic ([address] 1/14/2016    CAD (coronary artery disease) 06/2013    mild MI     Carotid stenosis without Infarct 1/19/2016    Chronic anemia 6/26/2013    Chronic kidney disease     ESRD stage 4, M, W, F dialysis. Mabeline Sink  in  [facility]    Chronic renal failure 1/19/2016    Crohn's disease ([address]     colectomy    Dyslipidemia 6/26/2013    Edema 1/19/2016    ESRD (end stage renal disease) on dialysis ([address] 05/16/2013    right arm functioning- fistula----M-W-F AT [address] D[address]IS    Former smoker     GERD (gastroesophageal reflux disease)     controlled on meds takes prilosec    Gout 6/26/2013    History of ileostomy 5/16/2013    History of peritonitis 2013    dialysis associated    History of sepsis 2009    Hypercholesteremia     Hyperlipidemia 1/19/2016    Hypertension      well controlled by medication    Hypokalemia 5/16/2013    Hypomagnesemia 6/23/2009    Ileostomy in place ([address]     Intractable nausea and vomiting 5/16/2013    Leukocytosis 5/16/2013    NSTEMI (non-ST elevated myocardial infarction) ([address] 6/26/2013    Palpitations 1/19/2016    Paroxysmal atrial fibrillation (HCC) 1/19/2016    Paroxysmal tachycardia ([address] 1/19/2016    S/P AAA repair     \" aneurysm in my stomach repaired\"    Serum lipase elevation 5/16/2013    Tricuspid regurgitation EF 55-60%    moderate to severe per echo 6/26/15      Past Surgical History:   Procedure Laterality Date    ABDOMEN SURGERY PROC UNLISTED      insertion and removal of infected PD cath    COLONOSCOPY N/A 10/20/2016    ILEOSCOPY THROUGH OSTOMY/ 25 performed by Blanquita Naik MD at [address] [facility] HX AAA REPAIR  2013    HX BREAST BIOPSY Bilateral       x3 on left breast    HX CATARACT REMOVAL Bilateral 2005    wit IOL    HX COLECTOMY  1983    colon removed / ileostomy    HX LAP CHOLECYSTECTOMY  2013    VASCULAR SURGERY PROCEDURE UNLIST Left 2009    fistulagram 8/09, LUE    VASCULAR SURGERY PROCEDURE UNLIST      7/09 L subclavian hemodialysis cath    VASCULAR SURGERY PROCEDURE UNLIST Right 2014    fistula      No current facility-administered medications for this encounter. Current Outpatient Prescriptions   Medication Sig    esomeprazole (NEXIUM) 20 mg capsule Take  by mouth daily.  aspirin delayed-release 81 mg tablet Take  by mouth daily.  omeprazole (PRILOSEC OTC) 20 mg tablet Take 20 mg by mouth daily.  lidocaine-prilocaine (EMLA) topical cream Apply  to affected area as needed for Pain.  dilTIAZem CD (CARDIZEM CD) 120 mg ER capsule Take 1 Cap by mouth daily.  sevelamer carbonate (RENVELA) 800 mg tab tab Take 1,600 mg by mouth three (3) times daily (with meals). Indications: Renal Osteodystrophy with Hyperphosphatemia    zolpidem (AMBIEN) 10 mg tablet Take 5 mg by mouth nightly. Indications: SLEEP-ONSET INSOMNIA    carvedilol (COREG) 25 mg tablet Take 1 Tab by mouth two (2) times a day.      Allergies   Allergen Reactions    Adhesive Other (comments)     Skin tears        Social History   Substance Use Topics    Smoking status: Former Smoker     Packs/day: 0.25     Years: 5.00     Quit date: 8/22/1971    Smokeless tobacco: Never Used      Comment: quit years ago    Alcohol use No      Family History   Problem Relation Age of Onset    Cancer Mother     Stroke Father    [facility] hospitals Hypertension Father     Heart Disease Brother     Heart Disease Brother     Stroke Sister     Hypertension Sister         Review of Systems:  Gen - no fever, appetite unchanged  CV - no chest pain, no palpitation  Lung - + shortness of breath, no cough  Abd - no tenderness, no nausea/vomiting, no diarrhea  Ext - no edema  Musculoskeletal - no joint pain      Objective:  Vitals:    07/30/18 1049 07/30/18 1158 07/30/18 1200 07/30/18 1231   BP: 142/72 151/82 156/76 185/80   Pulse: 90 92 88 97   Resp: 26 25   Temp: 97.5 °F (36.4 °C)      SpO2: 93%  95% 94%   Weight: 65.3 kg (144 lb)      Height: 5' 3\" (1.6 m)        No intake or output data in the 24 hours ending 07/30/18 1247  Wt Readings from Last 3 Encounters:   07/30/18 65.3 kg (144 lb)   07/26/18 65.3 kg (144 lb)   07/10/18 65.3 kg (144 lb)       GEN - in no distress, alert and oriented  Neck - no JVD  CV - regular, no murmur, no rub  Lung - basilar rales bilaterally, lungs expand symmetrically  Chest wall - normal appearance  Abd - soft, nontender, bowel sounds present  Ext - no edema, AVF + bruit        Data Review:     Recent Labs      07/30/18   1102   WBC  6.8   HGB  12.8   HCT  39.7   PLT  263        Recent Labs      07/30/18   1102   NA  137   K  5.9*   CL  101   CO2  24   BUN  31*   CREA  9.02*   CA  8.2*   GLU  155*         Assessment:     Active Problems:    Shortness of breath (7/30/2018)        Plan:     1. ESRD -   - Usual HD today, attempt extra UF for pulmonary edema  2. SOB    Addendum:  Seen on HD at 2:45 PM for clearance and UF. VSS but some cramping so back off UF goals just a little.

## 2018-07-30 NOTE — ED NOTES
TRANSFER - OUT REPORT: 
 
Verbal report given to Alis White RN(name) on Maricel Devries  being transferred to 830(unit) for routine progression of care Report consisted of patients Situation, Background, Assessment and  
Recommendations(SBAR). Information from the following report(s) SBAR, ED Summary and Recent Results was reviewed with the receiving nurse. Lines:  
Peripheral IV 07/30/18 Left Forearm (Active) Site Assessment Clean, dry, & intact 7/30/2018 12:41 PM  
Phlebitis Assessment 0 7/30/2018 12:41 PM  
Infiltration Assessment 0 7/30/2018 12:41 PM  
Dressing Status Clean, dry, & intact 7/30/2018 12:41 PM  
Hub Color/Line Status Blue 7/30/2018 12:41 PM  
Alcohol Cap Used No 7/30/2018 12:41 PM  
  
 
Opportunity for questions and clarification was provided. Patient transported with: 
 O2 @ 1 liters

## 2018-07-30 NOTE — ED PROVIDER NOTES
HPI Comments: Patient states she woke up at 3 am with SOB. No cough or fever. No missed dialysis. She has been on dialysis 8 years and goes M, W, F. States she makes urine. Has Crohn's and total colectomy and ileostomy. Quit smoking 55 years ago. No history of lung disease. MI 3 to 4 years ago. Recent stress test was OK. No chest pain. Patient is a 80 y.o. female presenting with shortness of breath. The history is provided by the patient, medical records and a relative (daughter). Shortness of Breath This is a new problem. Episode onset: 3 am. The problem has been gradually worsening. Associated symptoms include PND, orthopnea and leg swelling (a little at times). Pertinent negatives include no fever, no cough, no sputum production, no wheezing, no chest pain, no vomiting, no abdominal pain and no leg pain. Associated medical issues include CAD and past MI. Associated medical issues do not include chronic lung disease. Past Medical History:  
Diagnosis Date  Abdominal pain, chronic, right lower quadrant 5/16/2013  Anticoagulated on Coumadin   
 was told to hold for 5 days- held as 10/15/16  Aortic stenosis 06/26/2015  
 mild to moderate per echo  Arthritis   
 minimal  
 Atrial fibrillation, chronic (HCC) 1/14/2016  CAD (coronary artery disease) 06/2013  
 mild MI   
 Carotid stenosis without Infarct 1/19/2016  Chronic anemia 6/26/2013  Chronic kidney disease ESRD stage 4, M, W, F dialysis. Jenny Mcgee in  [facility]  Chronic renal failure 1/19/2016  Crohn's disease ([facility]   
 colectomy  Dyslipidemia 6/26/2013  Edema 1/19/2016  ESRD (end stage renal disease) on dialysis ([facility] 05/16/2013  
 right arm functioning- fistula----M-W-F AT [facility]  
 Former smoker  GERD (gastroesophageal reflux disease)   
 controlled on meds takes prilosec  Gout 6/26/2013  History of ileostomy 5/16/2013  History of peritonitis 2013  
 dialysis associated  History of sepsis 2009  Hypercholesteremia  Hyperlipidemia 1/19/2016  Hypertension   
  well controlled by medication  Hypokalemia 5/16/2013  Hypomagnesemia 6/23/2009  Ileostomy in place [facility])  Intractable nausea and vomiting 5/16/2013  Leukocytosis 5/16/2013  
 NSTEMI (non-ST elevated myocardial infarction) ([facility] 6/26/2013  Palpitations 1/19/2016  Paroxysmal atrial fibrillation ([facility] 1/19/2016  Paroxysmal tachycardia ([facility] 1/19/2016  S/P AAA repair \" aneurysm in my stomach repaired\"  Serum lipase elevation 5/16/2013  Tricuspid regurgitation EF 55-60%  
 moderate to severe per echo 6/26/15 Past Surgical History:  
Procedure Laterality Date  ABDOMEN SURGERY PROC UNLISTED    
 insertion and removal of infected PD cath  COLONOSCOPY N/A 10/20/2016 ILEOSCOPY THROUGH OSTOMY/ 25 performed by Nuvia Chan MD at [facility] HX AAA REPAIR  2013  HX BREAST BIOPSY Bilateral    
  x3 on left breast  
 HX CATARACT REMOVAL Bilateral 2005  
 wit IOL  HX COLECTOMY  1983  
 colon removed / ileostomy  HX LAP CHOLECYSTECTOMY  2013  VASCULAR SURGERY PROCEDURE UNLIST Left 2009  
 fistulagram 8/09, LUE  VASCULAR SURGERY PROCEDURE UNLIST    
 7/09 L subclavian hemodialysis cath  VASCULAR SURGERY PROCEDURE UNLIST Right 2014  
 fistula Family History:  
Problem Relation Age of Onset  Cancer Mother  Stroke Father  Hypertension Father  Heart Disease Brother  Heart Disease Brother  Stroke Sister  Hypertension Sister Social History Social History  Marital status:  Spouse name: N/A  
 Number of children: N/A  
 Years of education: N/A Occupational History  Not on file. Social History Main Topics  Smoking status: Former Smoker Packs/day: 0.25 Years: 5.00 Quit date: 8/22/1971  Smokeless tobacco: Never Used Comment: quit years ago  Alcohol use No  
 Drug use: No  
 Sexual activity: Not on file Other Topics Concern  Not on file Social History Narrative ALLERGIES: Adhesive Review of Systems Constitutional: Positive for fatigue. Negative for chills and fever. HENT: Negative. Eyes: Negative. Respiratory: Positive for shortness of breath. Negative for cough, sputum production and wheezing. Cardiovascular: Positive for orthopnea, leg swelling (a little at times) and PND. Negative for chest pain. Gastrointestinal: Negative. Negative for abdominal pain, nausea and vomiting. Ileostomy. Genitourinary: Negative for dysuria and urgency. ESRD Musculoskeletal: Negative. Skin: Negative. Neurological: Negative. Hematological: Negative. Psychiatric/Behavioral: Negative. Vitals:  
 07/30/18 1049 07/30/18 1158 07/30/18 1200 07/30/18 1231 BP: 142/72 151/82 156/76 185/80 Pulse: 90 92 88 97 Resp: 26   25 Temp: 97.5 °F (36.4 °C) SpO2: 93%  95% 94% Weight: 65.3 kg (144 lb) Height: 5' 3\" (1.6 m) Physical Exam  
Constitutional: She is oriented to person, place, and time. She appears well-developed and well-nourished. HENT:  
Head: Normocephalic and atraumatic. Right Ear: External ear normal.  
Left Ear: External ear normal.  
Mouth/Throat: Oropharynx is clear and moist.  
dentures Eyes: Conjunctivae and EOM are normal. Pupils are equal, round, and reactive to light. No scleral icterus. Neck: Normal range of motion. Neck supple. No JVD present. Cardiovascular: Normal rate, regular rhythm, normal heart sounds and intact distal pulses. Pulmonary/Chest: She has rales. Increased effort and decreased breath sounds. Abdominal: Soft. Bowel sounds are normal. She exhibits no mass. There is no tenderness. Musculoskeletal: Normal range of motion. She exhibits edema (trace edema lower legs. ). She exhibits no tenderness.   
Neurological: She is alert and oriented to person, place, and time.  
Skin: Skin is warm and dry. No erythema. Psychiatric: She has a normal mood and affect. Her behavior is normal.  
Nursing note and vitals reviewed. MDM Number of Diagnoses or Management Options Acute pulmonary edema ([address]): Amount and/or Complexity of Data Reviewed Clinical lab tests: ordered and reviewed Tests in the radiology section of CPT®: ordered and reviewed Decide to obtain previous medical records or to obtain history from someone other than the patient: yes Review and summarize past medical records: yes Discuss the patient with other providers: yes Independent visualization of images, tracings, or specimens: yes Critical Care Total time providing critical care: 30-74 minutes (Critical care time 35 minutes) Patient Progress Patient progress: stable ED Course Procedures Acute pulmonary edema in patient with ESRD on dialysis Makes urine so trial of Lasix 40 mg IV and nitroglycerin 1/2 inch to chest while making arrangements for dialysis Oxygen 2 liters NC - sat up to 94 % though she still feels dyspneic EKG - atrial fib with LBBB - she has chronic A fib - stopped Coumadin a few months ago - Cardiology aware CXR - pulmonary edema Labs reviewed Consult Hospitalist - Jamar - will see Consult Nephrology - May Smith says he talked to [facility] and they will dialyze her

## 2018-07-30 NOTE — DIALYSIS
TRANSFER IN - DIALYSIS Received patient in dialysis unit from Emergency Room (unit) for ordered procedure. Consent verified for renal replacement therapy. Patient alert and orientated and vital signs stable. /76 P95 Hemodialysis initiated using right upper arm AVF and 15 g needles. Machine settings per MD order. Will monitor during treatment.

## 2018-07-30 NOTE — DIALYSIS
TRANSFER OUT -DIALYSIS Hemodialysis treatment completed with 400 ml NS. Patient complains of severe cramps with relief after blood returned. Patient alert and oriented. B/P- 124/74, HR-137, RR-22 2.0 Kgs removed. Needles X2 removed from access and manual pressure held until hemostasis complete and pressure dressing applied. Meds given- Lopressor- 25mg Patient to [address] after dialysis.

## 2018-07-30 NOTE — IP AVS SNAPSHOT
Summary of Care Report The Summary of Care report has been created to help improve care coordination. Users with access to Member Desk or [facility] (Web-based application) may access additional patient information including the Discharge Summary. If you are not currently a [address] user and need more information, please call the number listed below in the [place] [address] section and ask to be connected with Medical Records. Facility Information Name Address Phone [address] [address] [address] [phone] Patient Information Patient Name Sex BURAK Borjas ([id number]) Female 3/14/1933 Discharge Information Admitting Provider Service Area Unit Gerald Wynn MD / [id number] Med Surg / [phone] Discharge Provider Discharge Date/Time Discharge Disposition Destination (none) 2018 (Pending) [facility] (none) Patient Language Language ENGLISH [13] Hospital Problems as of 2018  Reviewed: 2018 12:12 PM by Ramila Mar NP Class Noted - Resolved Last Modified POA Active Problems Pleural effusion, left  2009 - Present 2018 by Candy Catherine MD Yes Entered by Preeti Howard ESRD (end stage renal disease) on dialysis ([facility] [address] [facility]  2013 - Present 2018 by Gerald Wynn MD Yes Entered by Peter Kunz History of ileostomy (Chronic)  2013 - Present 2018 by Gerald Wynn MD Yes Entered by Peter Kunz Chronic anemia (Chronic)  2013 - Present 2018 by Gerald Wynn MD Yes Entered by NE David Hypertension, benign  2016 - Present 2018 by Gerald Wynn MD Yes Entered by Linda Brannon CAD (coronary artery disease)  2016 - Present 2018 by Gerald Wynn MD Yes Entered by Carmen Lopez Overview Addendum 2016  1:33 PM by Carmen Lopez ASCAD - 50% LAD and RCA by cath 2 years ago Paroxysmal atrial fibrillation ([facility]  2016 - Present 2018 by Deepthi Shaffer MD Yes Entered by Carmen Lopez * (Principal)Shortness of breath  2018 - Present 2018 by Deepthi Shaffer MD Yes Entered by Deepthi Shaffer MD  
  Pulmonary edema  2018 - Present 2018 by Deepthi Shaffer MD Yes Entered by Deepthi Shaffer MD  
  Pleural effusion, right  2018 - Present 2018 by Priya Lopez MD Unknown Entered by Priya Lopez MD  
  
Non-Hospital Problems as of 2018  Reviewed: 2018 12:12 PM by Orlie Siemens, RANDALL Class Noted - Resolved Last Modified Active Problems Hypomagnesemia  2009 - Present 2013 Entered by Herson Winston Crohn's disease ([facility]  7/3/2009 - Present 2013 Entered by Tiff Horvath MD  
  Intractable nausea and vomiting  2013 - Present 2013 Entered by Chiqui Haas Abdominal pain, chronic, right lower quadrant (Chronic)  2013 - Present 2013 Entered by Chiqui Haas Leukocytosis  2013 - Present 2013 Entered by Chiqui Haas Hypokalemia  2013 - Present 2013 Entered by Chiqui Haas Serum lipase elevation  2013 - Present 2013 Entered by Chiqui Haas Peritonitis, dialysis-associated ([facility]  2013 - Present 2013 Entered by Hallie Sanchez Symptomatic cholelithiasis  2013 - Present 2013 Entered by Hallie Sanchez NSTEMI (non-ST elevated myocardial infarction) ([facility]  2013 - Present 2013 Entered by NE Gonzalez Dyslipidemia (Chronic)  2013 - Present 2013 Entered by NE Gonzalez Gout (Chronic)  2013 - Present 2013 Entered by NE Alanis Atrial fibrillation, chronic ([facility]  1/14/2016 - Present 7/30/2018 by Shane Chand MD  
  Entered by Noe Carrillo Edema  1/19/2016 - Present 1/19/2016 by Quyen Barbosa Entered by Quyen Barbosa Anemia  1/19/2016 - Present 1/19/2016 by Quyen Barbosa Entered by Quyen Barbosa Chronic renal failure  1/19/2016 - Present 1/19/2016 by Quyen Barbosa Entered by Quyen Barbosa Aortic stenosis  1/19/2016 - Present 1/19/2016 by Quyen Barbosa Entered by Quyen Barbosa Palpitations  1/19/2016 - Present 1/19/2016 by Quyen Barbosa Entered by Quyen Barbosa Carotid stenosis without Infarct  1/19/2016 - Present 1/19/2016 by Quyen Barbosa Entered by Quyen Barbosa Hyperlipidemia  1/19/2016 - Present 1/19/2016 by Quyen Barbosa Entered by Quyen Barbosa Paroxysmal tachycardia ([facility]  1/19/2016 - Present 1/19/2016 by Quyen Barbosa Entered by Quyen Barbosa You are allergic to the following Allergen Reactions Adhesive Other (comments) Skin tears Current Discharge Medication List  
  
START taking these medications Dose & Instructions Dispensing Information Comments  
 metoprolol tartrate 50 mg tablet Commonly known as:  LOPRESSOR Dose:  50 mg Take 1 Tab by mouth two (2) times a day. Quantity:  60 Tab Refills:  1 CONTINUE these medications which have CHANGED Dose & Instructions Dispensing Information Comments  
 dilTIAZem  mg ER capsule Commonly known as:  CARDIZEM CD Start taking on:  8/3/2018 What changed:   
- medication strength 
- how much to take Dose:  180 mg Take 1 Cap by mouth daily. Quantity:  30 Cap Refills:  1 CONTINUE these medications which have NOT CHANGED Dose & Instructions Dispensing Information Comments AMBIEN 10 mg tablet Generic drug:  zolpidem Dose:  5 mg Take 5 mg by mouth nightly.  Indications: SLEEP-ONSET INSOMNIA  
 Refills:  0  
   
 aspirin delayed-release 81 mg tablet Take  by mouth daily. Refills:  0  
   
 esomeprazole 20 mg capsule Commonly known as:  Shala Barker Take  by mouth daily. Refills:  0  
   
 RENVELA 800 mg Tab tab Generic drug:  sevelamer carbonate Dose:  1600 mg Take 1,600 mg by mouth three (3) times daily (with meals). Indications: Renal Osteodystrophy with Hyperphosphatemia Refills:  0 STOP taking these medications Comments  
 carvedilol 25 mg tablet Commonly known as:  COREG  
   
   
 diphenhydrAMINE 50 mg tablet Commonly known as:  BENADRYL  
   
   
 lidocaine-prilocaine topical cream  
Commonly known as:  EMLA Surgery Information ID Date/Time Status Primary Surgeon All Procedures Location [id number] 7/31/2018  1:40 PM Posted Guadalupe Clark MD ULTRASOUND THORACENTESIS [facility] ENDOSCOPY ULTRASOUND:  [id number] Follow-up Information Follow up With Details Comments Contact Info None  pt is looking for a pcp None ([phone]) Patient stated that they have no PCP Discharge Instructions DISCHARGE SUMMARY from Nurse PATIENT INSTRUCTIONS: 
 
 
F-face looks uneven A-arms unable to move or move unevenly S-speech slurred or non-existent T-time-call 911 as soon as signs and symptoms begin-DO NOT go Back to bed or wait to see if you get better-TIME IS BRAIN. Warning Signs of HEART ATTACK Call 911 if you have these symptoms: 
? Chest discomfort.  Most heart attacks involve discomfort in the center of the chest that lasts more than a few minutes, or that goes away and comes back. It can feel like uncomfortable pressure, squeezing, fullness, or pain. ? Discomfort in other areas of the upper body. Symptoms can include pain or discomfort in one or both arms, the back, neck, jaw, or stomach. ? Shortness of breath with or without chest discomfort. ? Other signs may include breaking out in a cold sweat, nausea, or lightheadedness. Don't wait more than five minutes to call [address]! Fast action can save your life. Calling 911 is almost always the fastest way to get lifesaving treatment. Emergency Medical Services staff can begin treatment when they arrive  up to an hour sooner than if someone gets to the hospital by car. The discharge information has been reviewed with the patient. The patient verbalized understanding. Discharge medications reviewed with the patient and appropriate educational materials and side effects teaching were provided. ___________________________________________________________________________________________________________________________________ Shortness of Breath: Care Instructions Your Care Instructions Shortness of breath has many causes. Sometimes conditions such as anxiety can lead to shortness of breath. Some people get mild shortness of breath when they exercise. Trouble breathing also can be a symptom of a serious problem, such as asthma, lung disease, emphysema, heart problems, and pneumonia. If your shortness of breath continues, you may need tests and treatment. Watch for any changes in your breathing and other symptoms. Follow-up care is a key part of your treatment and safety. Be sure to make and go to all appointments, and call your doctor if you are having problems. It's also a good idea to know your test results and keep a list of the medicines you take. How can you care for yourself at home? · Do not smoke or allow others to smoke around you.  If you need help quitting, talk to your doctor about stop-smoking programs and medicines. These can increase your chances of quitting for good. · Get plenty of rest and sleep. · Take your medicines exactly as prescribed. Call your doctor if you think you are having a problem with your medicine. · Find healthy ways to deal with stress. ¨ Exercise daily. ¨ Get plenty of sleep. ¨ Eat regularly and well. When should you call for help? Call 911 anytime you think you may need emergency care. For example, call if: 
  · You have severe shortness of breath.  
  · You have symptoms of a heart attack. These may include: ¨ Chest pain or pressure, or a strange feeling in the chest. 
¨ Sweating. ¨ Shortness of breath. ¨ Nausea or vomiting. ¨ Pain, pressure, or a strange feeling in the back, neck, jaw, or upper belly or in one or both shoulders or arms. ¨ Lightheadedness or sudden weakness. ¨ A fast or irregular heartbeat. After you call 911, the  may tell you to chew 1 adult-strength or 2 to 4 low-dose aspirin. Wait for an ambulance. Do not try to drive yourself.  
 Call your doctor now or seek immediate medical care if: 
  · Your shortness of breath gets worse or you start to wheeze. Wheezing is a high-pitched sound when you breathe.  
  · You wake up at night out of breath or have to prop your head up on several pillows to breathe.  
  · You are short of breath after only light activity or while at rest.  
 Watch closely for changes in your health, and be sure to contact your doctor if: 
  · You do not get better over the next 1 to 2 days. Where can you learn more? Go to http://mello-amber.info/. Enter S780 in the search box to learn more about \"Shortness of Breath: Care Instructions. \" Current as of: December 6, 2017 Content Version: 11.7 © 9911-9435 [employer]  Care instructions adapted under license by [employer] (which disclaims liability or warranty for this information). If you have questions about a medical condition or this instruction, always ask your healthcare professional. [place] [address] any warranty or liability for your use of this information. Chart Review Routing History Recipient Method Report Sent By Brain Kera Orantes MD [[id number]] 2/3/2012  8:00 AM 02/03/2012 Raine Arthur MD  
Fax: [phone] Phone: [phone] Fax IP Auto Routed Deedee Sol MD [[id number]] 5/19/2013  7:49 AM 05/19/2013 Sharlene Gunn MD  
Fax: [phone] Phone: [phone] Fax Notes/Transcriptions Ольга Kapadia, [address] 5/21/2013  5:24 PM 05/16/2013 Iva San MD  
Fax: [phone] Phone: [phone] Fax IP Auto Routed Bethanie Lombard, MD [[id number]] 12/18/2016  4:31 PM 12/18/2016 Raine Arthur MD  
Fax: [phone] Phone: [phone] Fax [facility] CUSTOM LAB REPORT Rosalba Maldonado [[id number]] 1/26/2017 11:27 AM 1/26/2017

## 2018-07-30 NOTE — PROGRESS NOTES
Informed RN Deborah Ledesma that patient needs 20 G in [facility] before we can scan CT

## 2018-07-30 NOTE — PROGRESS NOTES
TRANSFER - IN REPORT: 
 
Verbal report received from [facility], [address] (name) on David Wasserman  being received from ER (unit) for routine progression of care Report consisted of patients Situation, Background, Assessment and  
Recommendations(SBAR). Information from the following report(s) SBAR and Kardex was reviewed with the receiving nurse. Opportunity for questions and clarification was provided. Assessment completed upon patients arrival to unit and care assumed. SBAR given to primary receiving RN, Natan Camacho.

## 2018-07-30 NOTE — H&P
HOSPITALIST H&P/CONSULT 
NAME:  Tessa Kan Age:  80 y.o. 
:   3/14/1933 DOS:               18 MRN:   [id number] PCP: None Consulting MD: Treatment Team: Attending Provider: Zakiya Mcneil MD; Primary Nurse: Whitney Granados, LOLY 
 
HPI:  
Ms. Audrey Ovalle is a 79 yo F with PMHx of  CAD, pAFib on no OAC because she refused, HTN, HLP, ESRD on HD MWF, Chronic anemia due ro CKD, Chron's disease s/p ileostomy who complains of sudden onset of SOB that started around 3AM associated with nonproductive cough. Denies chest pain or spikes of fevers. Complains of increased b/l LE edema. LAst HD was Friday, uneventful. ED work-up showed WBC:6.8, Cr:9.01, K:5.9, Trop I:0.08. CXR with pulmonary edema Received IV lasix and . Nephrology consulted for emergency HD.  
 
10+ point ROS done and is negative except as noted in HPI. Past Medical History:  
Diagnosis Date  Abdominal pain, chronic, right lower quadrant 2013  Anticoagulated on Coumadin   
 was told to hold for 5 days- held as 10/15/16  Aortic stenosis 2015  
 mild to moderate per echo  Arthritis   
 minimal  
 Atrial fibrillation, chronic (HCC) 2016  CAD (coronary artery disease) 2013  
 mild MI   
 Carotid stenosis without Infarct 2016  Chronic anemia 2013  Chronic kidney disease ESRD stage 4, M, W, F dialysis. [place] in  [facility]  Chronic renal failure 2016  Crohn's disease ([facility] [address]   
 colectomy  Dyslipidemia 2013  Edema 2016  ESRD (end stage renal disease) on dialysis ([facility] [address] 2013  
 right arm functioning- fistula----M-W-F AT [address]  
 Former smoker  GERD (gastroesophageal reflux disease)   
 controlled on meds takes prilosec  Gout 2013  History of ileostomy 2013  History of peritonitis   
 dialysis associated  History of sepsis 2009  Hypercholesteremia  Hyperlipidemia 2016  Hypertension   
  well controlled by medication  Hypokalemia 5/16/2013  Hypomagnesemia 6/23/2009  Ileostomy in place [facility])  Intractable nausea and vomiting 5/16/2013  Leukocytosis 5/16/2013  
 NSTEMI (non-ST elevated myocardial infarction) ([facility] [address] 6/26/2013  Palpitations 1/19/2016  Paroxysmal atrial fibrillation ([facility] [address] 1/19/2016  Paroxysmal tachycardia ([facility] [address] 1/19/2016  S/P AAA repair \" aneurysm in my stomach repaired\"  Serum lipase elevation 5/16/2013  Tricuspid regurgitation EF 55-60%  
 moderate to severe per echo 6/26/15 Past Surgical History:  
Procedure Laterality Date  ABDOMEN SURGERY PROC UNLISTED    
 insertion and removal of infected PD cath  COLONOSCOPY N/A 10/20/2016 ILEOSCOPY THROUGH OSTOMY/ 25 performed by Nuvia Chan MD at [address] HX AAA REPAIR  2013  HX BREAST BIOPSY Bilateral    
  x3 on left breast  
 HX CATARACT REMOVAL Bilateral 2005  
 wit IOL  HX COLECTOMY  1983  
 colon removed / ileostomy  HX LAP CHOLECYSTECTOMY  2013  VASCULAR SURGERY PROCEDURE UNLIST Left 2009  
 fistulagram 8/09, LUE  VASCULAR SURGERY PROCEDURE UNLIST    
 7/09 L subclavian hemodialysis cath  VASCULAR SURGERY PROCEDURE UNLIST Right 2014  
 fistula Prior to Admission Medications Prescriptions Last Dose Informant Patient Reported? Taking?  
aspirin delayed-release 81 mg tablet   Yes No  
Sig: Take  by mouth daily. carvedilol (COREG) 25 mg tablet   No No  
Sig: Take 1 Tab by mouth two (2) times a day. dilTIAZem CD (CARDIZEM CD) 120 mg ER capsule   No No  
Sig: Take 1 Cap by mouth daily. esomeprazole (NEXIUM) 20 mg capsule   Yes No  
Sig: Take  by mouth daily. lidocaine-prilocaine (EMLA) topical cream   Yes No  
Sig: Apply  to affected area as needed for Pain. omeprazole (PRILOSEC OTC) 20 mg tablet   Yes No  
Sig: Take 20 mg by mouth daily. sevelamer carbonate (RENVELA) 800 mg tab tab   Yes No  
Sig: Take 1,600 mg by mouth three (3) times daily (with meals). Indications: Renal Osteodystrophy with Hyperphosphatemia  
zolpidem (AMBIEN) 10 mg tablet   Yes No  
Sig: Take 5 mg by mouth nightly. Indications: SLEEP-ONSET INSOMNIA Facility-Administered Medications: None Home meds reconciled. Allergies Allergen Reactions  Adhesive Other (comments) Skin tears Social History Substance Use Topics  Smoking status: Former Smoker Packs/day: 0.25 Years: 5.00 Quit date: 1971  Smokeless tobacco: Never Used Comment: quit years ago  Alcohol use No  
  
Family History Problem Relation Age of Onset  Cancer Mother  Stroke Father  Hypertension Father  Heart Disease Brother  Heart Disease Brother  Stroke Sister  Hypertension Sister I personally reviewed home medications, social and family history. There is no immunization history for the selected administration types on file for this patient. Objective:  
 
Patient Vitals for the past 24 hrs: 
 Temp Pulse Resp BP SpO2  
18 1231 - 97 25 185/80 94 % 18 1200 - 88 - 156/76 95 % 18 1158 - 92 - 151/82 -  
18 1049 97.5 °F (36.4 °C) 90 26 142/72 93 % Temp (24hrs), Av.5 °F (36.4 °C), Min:97.5 °F (36.4 °C), Max:97.5 °F (36.4 °C) Oxygen Therapy O2 Sat (%): 94 % (18 1231) Pulse via Oximetry: 100 beats per minute (18 1231) O2 Device: Room air (18 1241) Oxygen Therapy O2 Sat (%): 94 % (18 1231) Pulse via Oximetry: 100 beats per minute (18 1231) O2 Device: Room air (18 1241) Physical Exam: 
General:         Alert, cooperative, mild respiratory distress. Afebrile. HEENT:               NCAT. No obvious deformity. Nares normal. No drainage Lungs:                 Decreased air entry b/l. No wheezing. Crackles at the base b/l. NC 2L Cardiovascular:   RRR. No m/r/g. +2 pitting edema R>L pedal edema b/l. +1 PT/DT pulses b/l. Abdomen:       S/nt/nd.  + BS Skin:         No rashes or lesions. Not Jaundiced Neurologic:    AAOx3. No gross focal deficit. Moves all extremities. Psychiatric:         Good mood. Normal affect. Denies any SI/HI. Data Review:  
Recent Results (from the past 24 hour(s)) TROPONIN I Collection Time: 07/30/18 11:02 AM  
Result Value Ref Range Troponin-I, Qt. 0.08 (H) 0.02 - 0.05 NG/ML  
CBC WITH AUTOMATED DIFF Collection Time: 07/30/18 11:02 AM  
Result Value Ref Range WBC 6.8 4.3 - 11.1 K/uL  
 RBC 3.54 (L) 4.05 - 5.25 M/uL  
 HGB 12.8 11.7 - 15.4 g/dL HCT 39.7 35.8 - 46.3 % .1 (H) 79.6 - 97.8 FL  
 MCH 36.2 (H) 26.1 - 32.9 PG  
 MCHC 32.2 31.4 - 35.0 g/dL  
 RDW 19.5 (H) 11.9 - 14.6 % PLATELET 088 052 - 197 K/uL MPV 10.2 (L) 10.8 - 14.1 FL  
 DF AUTOMATED NEUTROPHILS 84 (H) 43 - 78 % LYMPHOCYTES 12 (L) 13 - 44 % MONOCYTES 4 4.0 - 12.0 % EOSINOPHILS 0 (L) 0.5 - 7.8 % BASOPHILS 0 0.0 - 2.0 % IMMATURE GRANULOCYTES 0 0.0 - 5.0 %  
 ABS. NEUTROPHILS 5.6 1.7 - 8.2 K/UL  
 ABS. LYMPHOCYTES 0.8 0.5 - 4.6 K/UL  
 ABS. MONOCYTES 0.3 0.1 - 1.3 K/UL  
 ABS. EOSINOPHILS 0.0 0.0 - 0.8 K/UL  
 ABS. BASOPHILS 0.0 0.0 - 0.2 K/UL  
 ABS. IMM. GRANS. 0.0 0.0 - 0.5 K/UL METABOLIC PANEL, COMPREHENSIVE Collection Time: 07/30/18 11:02 AM  
Result Value Ref Range Sodium 137 136 - 145 mmol/L Potassium 5.9 (H) 3.5 - 5.1 mmol/L Chloride 101 98 - 107 mmol/L  
 CO2 24 21 - 32 mmol/L Anion gap 12 7 - 16 mmol/L Glucose 155 (H) 65 - 100 mg/dL BUN 31 (H) 8 - 23 MG/DL Creatinine 9.02 (H) 0.6 - 1.0 MG/DL  
 GFR est AA 5 (L) >60 ml/min/1.73m2 GFR est non-AA 4 (L) >60 ml/min/1.73m2 Calcium 8.2 (L) 8.3 - 10.4 MG/DL Bilirubin, total 0.7 0.2 - 1.1 MG/DL  
 ALT (SGPT) 20 12 - 65 U/L  
 AST (SGOT) 27 15 - 37 U/L Alk. phosphatase 78 50 - 136 U/L Protein, total 8.3 (H) 6.3 - 8.2 g/dL Albumin 4.1 3.2 - 4.6 g/dL Globulin 4.2 (H) 2.3 - 3.5 g/dL A-G Ratio 1.0 (L) 1.2 - 3.5 All Micro Results None  
  
 
 
Imaging /Procedures /Studies: 
I personally reviewed all labs, imaging, and other studies this admission: CXR Results  (Last 48 hours) 07/30/18 1121  XR CHEST PORT Final result Impression:  IMPRESSION: Pulmonary edema. Narrative:  Portable chest xray Comparison: 8/21/2017 Indication: Shortness of breath Findings: Cardiomegaly. Moderate effusions with pulmonary congestion. No  
pneumothorax. No discrete osseous abnormality on the single view. Assessment and Plan: Active Hospital Problems Diagnosis Date Noted  Shortness of breath 07/30/2018  Pulmonary edema 07/30/2018  Hypertension, benign 01/19/2016  CAD (coronary artery disease) 01/19/2016 ASCAD - 50% LAD and RCA by cath 2 years ago  Paroxysmal atrial fibrillation ([facility] [address] 01/19/2016  Chronic anemia 06/26/2013  History of ileostomy 05/16/2013  ESRD (end stage renal disease) on dialysis ([facility] [address] 05/16/2013 PLAN 
SOB:  
- likely due to pulmonary edema 
- nephrology will do HD today - appreciate the help 
- check D-dimer 
- serial Top I/EKGs, remote telemetry CAD: 
- on ASA - check lipid panel HTN: 
- resume home meds 
- PRN hydralazine pAFib: 
- on BB and cardizem 
- refused [address] Anemia: 
- stable - likely due to CKD ESRD on HD: 
- nephrology consulted - will have HD today. Appreciate the help Hx of Chron disease: 
- s/p ileostomy DVT ppx: Heparin SQ Code status:  Full CODE 
Estimated LOS: 2-3 days Risk assessment: high Plan of care discussed with: patient. Care team. Dr. Collette Beckham and Dr. Cynthia Cahdwick MsFrances Essie Jr  will need at least 2 midnights of admission. Olegario Bhakta MD 
07/30/18 Addendum: 
D-dimer:1.40. Will get CTA chest and US b/l LE. Nephrology OK with German Roldan MD 
07/30/18

## 2018-07-30 NOTE — PROGRESS NOTES
Admission database complete, unable to complete PTA med list. Patient states her daughter had all her meds down in the ER, but took them home.   Requested patient ask daughter to either bring meds back in or make a list to bring in and update med list.

## 2018-07-31 ENCOUNTER — APPOINTMENT (OUTPATIENT)
Dept: GENERAL RADIOLOGY | Age: 83
DRG: 186 | End: 2018-07-31
Attending: INTERNAL MEDICINE
Payer: MEDICARE

## 2018-07-31 PROBLEM — J90 PLEURAL EFFUSION, RIGHT: Status: ACTIVE | Noted: 2018-07-31

## 2018-07-31 LAB
ANION GAP SERPL CALC-SCNC: 12 MMOL/L (ref 7–16)
APPEARANCE FLD: NORMAL
BUN SERPL-MCNC: 16 MG/DL (ref 8–23)
CALCIUM SERPL-MCNC: 7.8 MG/DL (ref 8.3–10.4)
CHLORIDE SERPL-SCNC: 100 MMOL/L (ref 98–107)
CHOLEST SERPL-MCNC: 112 MG/DL
CO2 SERPL-SCNC: 28 MMOL/L (ref 21–32)
COLOR FLD: NORMAL
CREAT SERPL-MCNC: 5.97 MG/DL (ref 0.6–1)
ERYTHROCYTE [DISTWIDTH] IN BLOOD BY AUTOMATED COUNT: 19.1 % (ref 11.9–14.6)
FLUID COMMENTS, FCOM: NORMAL
GLUCOSE SERPL-MCNC: 95 MG/DL (ref 65–100)
HCT VFR BLD AUTO: 33.4 % (ref 35.8–46.3)
HDLC SERPL-MCNC: 29 MG/DL (ref 40–60)
HDLC SERPL: 3.9 {RATIO}
HGB BLD-MCNC: 10.5 G/DL (ref 11.7–15.4)
LDLC SERPL CALC-MCNC: 46.4 MG/DL
LIPID PROFILE,FLP: ABNORMAL
LYMPHOCYTES NFR FLD: 90 %
MAGNESIUM SERPL-MCNC: 2 MG/DL (ref 1.8–2.4)
MCH RBC QN AUTO: 35 PG (ref 26.1–32.9)
MCHC RBC AUTO-ENTMCNC: 31.4 G/DL (ref 31.4–35)
MCV RBC AUTO: 111.3 FL (ref 79.6–97.8)
NEUTROPHILS NFR FLD: 10 %
NUC CELL # FLD: 596 /CU MM
PLATELET # BLD AUTO: 190 K/UL (ref 150–450)
PMV BLD AUTO: 10.2 FL (ref 10.8–14.1)
POTASSIUM SERPL-SCNC: 4.5 MMOL/L (ref 3.5–5.1)
RBC # BLD AUTO: 3 M/UL (ref 4.05–5.25)
RBC # FLD: NORMAL /CU MM
SODIUM SERPL-SCNC: 140 MMOL/L (ref 136–145)
SPECIMEN SOURCE FLD: NORMAL
TRIGL SERPL-MCNC: 183 MG/DL (ref 35–150)
TROPONIN I SERPL-MCNC: 0.1 NG/ML (ref 0.02–0.05)
TROPONIN I SERPL-MCNC: 0.1 NG/ML (ref 0.02–0.05)
VLDLC SERPL CALC-MCNC: 36.6 MG/DL (ref 6–23)
WBC # BLD AUTO: 5.4 K/UL (ref 4.3–11.1)

## 2018-07-31 PROCEDURE — 87205 SMEAR GRAM STAIN: CPT | Performed by: HOSPITALIST

## 2018-07-31 PROCEDURE — 97530 THERAPEUTIC ACTIVITIES: CPT

## 2018-07-31 PROCEDURE — 83615 LACTATE (LD) (LDH) ENZYME: CPT | Performed by: HOSPITALIST

## 2018-07-31 PROCEDURE — 80048 BASIC METABOLIC PNL TOTAL CA: CPT | Performed by: HOSPITALIST

## 2018-07-31 PROCEDURE — 97161 PT EVAL LOW COMPLEX 20 MIN: CPT

## 2018-07-31 PROCEDURE — 85027 COMPLETE CBC AUTOMATED: CPT | Performed by: HOSPITALIST

## 2018-07-31 PROCEDURE — 93306 TTE W/DOPPLER COMPLETE: CPT

## 2018-07-31 PROCEDURE — 87102 FUNGUS ISOLATION CULTURE: CPT

## 2018-07-31 PROCEDURE — 0W993ZZ DRAINAGE OF RIGHT PLEURAL CAVITY, PERCUTANEOUS APPROACH: ICD-10-PCS | Performed by: INTERNAL MEDICINE

## 2018-07-31 PROCEDURE — 36415 COLL VENOUS BLD VENIPUNCTURE: CPT | Performed by: HOSPITALIST

## 2018-07-31 PROCEDURE — 99223 1ST HOSP IP/OBS HIGH 75: CPT | Performed by: INTERNAL MEDICINE

## 2018-07-31 PROCEDURE — 71045 X-RAY EXAM CHEST 1 VIEW: CPT

## 2018-07-31 PROCEDURE — 80061 LIPID PANEL: CPT | Performed by: HOSPITALIST

## 2018-07-31 PROCEDURE — 83735 ASSAY OF MAGNESIUM: CPT | Performed by: HOSPITALIST

## 2018-07-31 PROCEDURE — 84157 ASSAY OF PROTEIN OTHER: CPT | Performed by: HOSPITALIST

## 2018-07-31 PROCEDURE — 88305 TISSUE EXAM BY PATHOLOGIST: CPT | Performed by: INTERNAL MEDICINE

## 2018-07-31 PROCEDURE — 0W9B3ZZ DRAINAGE OF LEFT PLEURAL CAVITY, PERCUTANEOUS APPROACH: ICD-10-PCS | Performed by: INTERNAL MEDICINE

## 2018-07-31 PROCEDURE — 84484 ASSAY OF TROPONIN QUANT: CPT | Performed by: HOSPITALIST

## 2018-07-31 PROCEDURE — 89050 BODY FLUID CELL COUNT: CPT | Performed by: HOSPITALIST

## 2018-07-31 PROCEDURE — 65660000000 HC RM CCU STEPDOWN

## 2018-07-31 PROCEDURE — 88112 CYTOPATH CELL ENHANCE TECH: CPT | Performed by: INTERNAL MEDICINE

## 2018-07-31 PROCEDURE — 74011250636 HC RX REV CODE- 250/636: Performed by: HOSPITALIST

## 2018-07-31 PROCEDURE — 32555 ASPIRATE PLEURA W/ IMAGING: CPT | Performed by: INTERNAL MEDICINE

## 2018-07-31 PROCEDURE — 74011250637 HC RX REV CODE- 250/637: Performed by: HOSPITALIST

## 2018-07-31 PROCEDURE — 76040000025: Performed by: INTERNAL MEDICINE

## 2018-07-31 PROCEDURE — 82945 GLUCOSE OTHER FLUID: CPT | Performed by: HOSPITALIST

## 2018-07-31 PROCEDURE — 87116 MYCOBACTERIA CULTURE: CPT | Performed by: HOSPITALIST

## 2018-07-31 PROCEDURE — C1729 CATH, DRAINAGE: HCPCS | Performed by: INTERNAL MEDICINE

## 2018-07-31 RX ADMIN — HEPARIN SODIUM 5000 UNITS: 5000 INJECTION INTRAVENOUS; SUBCUTANEOUS at 17:07

## 2018-07-31 RX ADMIN — Medication 5 ML: at 05:50

## 2018-07-31 RX ADMIN — CARVEDILOL 25 MG: 25 TABLET, FILM COATED ORAL at 17:07

## 2018-07-31 RX ADMIN — HEPARIN SODIUM 5000 UNITS: 5000 INJECTION INTRAVENOUS; SUBCUTANEOUS at 22:38

## 2018-07-31 RX ADMIN — Medication 1 AMPULE: at 22:38

## 2018-07-31 RX ADMIN — Medication 10 ML: at 15:58

## 2018-07-31 RX ADMIN — SEVELAMER CARBONATE 1600 MG: 800 TABLET, FILM COATED ORAL at 12:17

## 2018-07-31 RX ADMIN — SEVELAMER CARBONATE 1600 MG: 800 TABLET, FILM COATED ORAL at 17:07

## 2018-07-31 RX ADMIN — ASPIRIN 81 MG: 81 TABLET, COATED ORAL at 08:36

## 2018-07-31 RX ADMIN — Medication 10 ML: at 22:40

## 2018-07-31 RX ADMIN — DILTIAZEM HYDROCHLORIDE 120 MG: 120 CAPSULE, COATED, EXTENDED RELEASE ORAL at 08:36

## 2018-07-31 RX ADMIN — HEPARIN SODIUM 5000 UNITS: 5000 INJECTION INTRAVENOUS; SUBCUTANEOUS at 05:50

## 2018-07-31 RX ADMIN — CARVEDILOL 25 MG: 25 TABLET, FILM COATED ORAL at 08:36

## 2018-07-31 RX ADMIN — Medication 1 AMPULE: at 12:17

## 2018-07-31 RX ADMIN — SEVELAMER CARBONATE 1600 MG: 800 TABLET, FILM COATED ORAL at 08:36

## 2018-07-31 RX ADMIN — PANTOPRAZOLE SODIUM 40 MG: 40 TABLET, DELAYED RELEASE ORAL at 05:51

## 2018-07-31 RX ADMIN — ZOLPIDEM TARTRATE 5 MG: 5 TABLET ORAL at 22:38

## 2018-07-31 NOTE — PROGRESS NOTES
Pt sat up on side of bed for thoracentesis. Consent obtained. Time out performed. Pts vitals monitored throughout procedure. Bilateral ultrasound done and pic taken of pleural fluid.  ~500 ml ekaterina colored pleural fluid from L.  ~4700 ml ekaterina colored pleural fluid from R.  Pt tolerated procedure well with no adverse rxn. Specimens sent to the lab x 3 from L only per md order and labeled appropriately. Site dressed appropriately and report given to pts LOLY Mckenzie.    Ultrasound findings reviewed by MD. CXR ordered post procedure by md.

## 2018-07-31 NOTE — PROGRESS NOTES
Patient resting in bed quietly, on O2 n/c, patient denies SOB, no c/o pain or discomfort, patient A-fib 85, patient denies needs, call light within reach.

## 2018-07-31 NOTE — PROGRESS NOTES
MD paged and notified of patient critical trop, patient without chest pain, denies SOB however is wheezing, HR: A-Fib @ 92, MD to eval chart, no new orders received @ this time.

## 2018-07-31 NOTE — PROGRESS NOTES
Hospitalist Progress Note 2018 Admit Date: 2018 11:13 AM  
NAME: Irene Pitts :  3/14/1933 DOS:              18 MRN:  [id number] Attending: Sara Hsu MD 
PCP:  None Treatment Team: Attending Provider: Marylee Alamin, MD; Consulting Provider: Lu Hernández MD; Utilization Review: Sil Vidal; Consulting Provider: Judy Cramer MD 
 
Full Code SUBJECTIVE: As previously documented: 79 yo F with PMHx of  CAD, pAFib on no OAC because she refused, HTN, HLP, ESRD on HD MWF, Chronic anemia due ro CKD, Chron's disease s/p ileostomy who complains of sudden onset of SOB that started around 3AM associated with nonproductive cough. Denies chest pain or spikes of fevers. Complains of increased b/l LE edema. LAst HD was Friday, uneventful. ED work-up showed  CXR with pulmonary edema Received IV lasix and . Nephrology consulted for emergency HD. S/p HD and improvement of the symptoms. Pulmonary recommended thoracocentesis R pleural effussion. 18    Irene Pitts stated her breathing improved overnight. Family at bedside all questions answered. Case discussed with daughter at bedside. 10+ ROS reviewed and negative except for positive in HPI. Allergies Allergen Reactions  Adhesive Other (comments) Skin tears Current Facility-Administered Medications Medication Dose Route Frequency  aspirin delayed-release tablet 81 mg  81 mg Oral DAILY  carvedilol (COREG) tablet 25 mg  25 mg Oral BID WITH MEALS  dilTIAZem CD (CARDIZEM CD) capsule 120 mg  120 mg Oral DAILY  pantoprazole (PROTONIX) tablet 40 mg  40 mg Oral ACB  sevelamer carbonate (RENVELA) tab 1,600 mg  1,600 mg Oral TID WITH MEALS  zolpidem (AMBIEN) tablet 5 mg  5 mg Oral QHS PRN  
 sodium chloride (NS) flush 5-10 mL  5-10 mL IntraVENous Q8H  
 sodium chloride (NS) flush 5-10 mL  5-10 mL IntraVENous PRN  
 acetaminophen (TYLENOL) tablet 650 mg  650 mg Oral Q4H PRN  
 heparin (porcine) injection 5,000 Units  5,000 Units SubCUTAneous Q8H  
 hydrALAZINE (APRESOLINE) 20 mg/mL injection 10 mg  10 mg IntraVENous Q6H PRN  
 alcohol 62% (NOZIN) nasal  1 Ampule  1 Ampule Topical Q12H There is no immunization history for the selected administration types on file for this patient. Objective:  
Patient Vitals for the past 24 hrs: 
 Temp Pulse Resp BP SpO2  
18 1533 97.7 °F (36.5 °C) 67 18 94/56 94 % 18 1400 - 82 18 96/48 98 % 18 1330 - 80 18 110/48 97 % 18 1204 - - - - 95 % 18 1125 97.8 °F (36.6 °C) 98 17 95/51 93 % 18 0724 97.7 °F (36.5 °C) 96 19 130/76 95 % 18 0326 98 °F (36.7 °C) 83 20 112/66 95 % 18 2349 98 °F (36.7 °C) 85 20 102/65 92 % 18 2031 98.2 °F (36.8 °C) 95 20 108/57 97 % Temp (24hrs), Av.9 °F (36.6 °C), Min:97.7 °F (36.5 °C), Max:98.2 °F (36.8 °C) Oxygen Therapy O2 Sat (%): 94 % (18 1533) Pulse via Oximetry: 82 beats per minute (18 1400) O2 Device: Room air (18 1400) O2 Flow Rate (L/min): 2 l/min (18 1742) Oxygen Therapy O2 Sat (%): 94 % (18 1533) Pulse via Oximetry: 82 beats per minute (18 1400) O2 Device: Room air (18 1400) O2 Flow Rate (L/min): 2 l/min (18 1742) Physical Exam: 
General:         Alert, cooperative, no distress HEENT:               NCAT. No obvious deformity. Lungs:                 Crackles at lung bases. No wheezing or ronchi. Cardiovascular:   RRR. No m/r/g. No pedal edema b/l. Abdomen:       S/nt/nd. Bowel sounds normal. .  
Skin:         No rashes or lesions. Not Jaundiced Neurologic:   . No gross focal deficit. Psychiatric:         Good mood. Normal affect. DIAGNOSTIC STUDIES Data Review:  
Recent Results (from the past 24 hour(s)) TROPONIN I Collection Time: 18 11:43 PM  
Result Value Ref Range  Troponin-I, Qt. 0.10 () 0.02 - 0.05 NG/ML CBC W/O DIFF Collection Time: 07/31/18  3:39 AM  
Result Value Ref Range WBC 5.4 4.3 - 11.1 K/uL  
 RBC 3.00 (L) 4.05 - 5.25 M/uL  
 HGB 10.5 (L) 11.7 - 15.4 g/dL HCT 33.4 (L) 35.8 - 46.3 % .3 (H) 79.6 - 97.8 FL  
 MCH 35.0 (H) 26.1 - 32.9 PG  
 MCHC 31.4 31.4 - 35.0 g/dL  
 RDW 19.1 (H) 11.9 - 14.6 % PLATELET 643 825 - 939 K/uL MPV 10.2 (L) 10.8 - 01.9 FL  
METABOLIC PANEL, BASIC Collection Time: 07/31/18  3:39 AM  
Result Value Ref Range Sodium 140 136 - 145 mmol/L Potassium 4.5 3.5 - 5.1 mmol/L Chloride 100 98 - 107 mmol/L  
 CO2 28 21 - 32 mmol/L Anion gap 12 7 - 16 mmol/L Glucose 95 65 - 100 mg/dL BUN 16 8 - 23 MG/DL Creatinine 5.97 (H) 0.6 - 1.0 MG/DL  
 GFR est AA 9 (L) >60 ml/min/1.73m2 GFR est non-AA 7 (L) >60 ml/min/1.73m2 Calcium 7.8 (L) 8.3 - 10.4 MG/DL  
LIPID PANEL Collection Time: 07/31/18  3:39 AM  
Result Value Ref Range LIPID PROFILE Cholesterol, total 112 <200 MG/DL Triglyceride 183 (H) 35 - 150 MG/DL  
 HDL Cholesterol 29 (L) 40 - 60 MG/DL  
 LDL, calculated 46.4 <100 MG/DL VLDL, calculated 36.6 (H) 6.0 - 23.0 MG/DL  
 CHOL/HDL Ratio 3.9 MAGNESIUM Collection Time: 07/31/18  3:39 AM  
Result Value Ref Range Magnesium 2.0 1.8 - 2.4 mg/dL TROPONIN I Collection Time: 07/31/18  3:39 AM  
Result Value Ref Range Troponin-I, Qt. 0.10 (HH) 0.02 - 0.05 NG/ML  
CELL COUNT, BODY FLUID Collection Time: 07/31/18  2:00 PM  
Result Value Ref Range BODY FLUID TYPE L PLEURAL   
 FLUID COLOR ORANGE    
 FLUID APPEARANCE CLOUDY FLUID RBC CT. 79188 /cu mm FLUID WBC COUNT 596 /cu mm  
 FLD NEUTROPHILS 10 % FLD LYMPHS 90 % FLUID COMMENT MODERATE NUMBER OF MONONUCLEAR FORMS PRESENT All Micro Results Procedure Component Value Units Date/Time CULTURE, BODY FLUID Keke Desir [[id number]] Collected:  07/31/18 1400 Order Status:  Completed Updated:  07/31/18 1558  FUNGUS CULTURE AND SMEAR [[id number]] Collected:  07/31/18 1400 Order Status:  Completed Updated:  07/31/18 1513 AFB CULTURE + SMEAR W/RFLX ID FROM CULTURE [[id number]] Collected:  07/31/18 1400 Order Status:  Completed Updated:  07/31/18 1512 Imaging Maryanne Rose /Studies: CXR Results  (Last 48 hours) 07/31/18 1440  XR CHEST PORT Final result Impression:  IMPRESSION: As above Narrative:  Portable chest x-ray 7/31/2018 INDICATION: Bilateral thoracentesis Comparison 7/30/2018 The bilateral pleural effusions have been drained. There is no pneumothorax. Cardiomegaly and pulmonary vascular congestion is again noted. 07/30/18 1121  XR CHEST PORT Final result Impression:  IMPRESSION: Pulmonary edema. Narrative:  Portable chest xray Comparison: 8/21/2017 Indication: Shortness of breath Findings: Cardiomegaly. Moderate effusions with pulmonary congestion. No  
pneumothorax. No discrete osseous abnormality on the single view. CT Results  (Last 48 hours) 07/30/18 2119  CT CHEST W CONT Final result Impression:  IMPRESSION:  
   
1. No pulmonary embolism. 2. Significant bilateral pleural effusions with lobe atelectasis. Narrative:  CT CHEST WITH CONTRAST 7/30/2018 HISTORY: Sudden onset of shortness of breath beginning at 3:00 AM with  
nonproductive cough. TECHNIQUE: The patient received 100 mL Isovue-370 nonionic IV contrast and axial  
images were obtained through the chest. Coronal reformatted images were  
generated. All CT scans at this facility used dose modulation, interactive  
reconstruction and/or weight based dosing when appropriate to reduce radiation  
dose to as low as reasonably achievable. COMPARISON: Portable chest x-ray from the same day FINDINGS: There are no filling defects within the pulmonary arteries suggestive  
of acute emboli.  Atherosclerotic coronary artery calcifications are present. The  
thoracic aorta is well-opacified without aneurysm or dissection. There is no thoracic adenopathy. Bilateral pleural effusions are present with lower lobe atelectasis. There is heterogeneous attenuation of the pulmonary parenchyma. This finding may  
be present with air trapping from small airways disease. Included portions of the upper abdomen demonstrate normal-appearing adrenal  
glands. There is a small hiatal hernia. There are no aggressive osseous lesions. Ct Chest W Cont Result Date: 2018 IMPRESSION: 1. No pulmonary embolism. 2. Significant bilateral pleural effusions with lobe atelectasis. Xr Chest [facility] Result Date: 2018 IMPRESSION: As above Duplex Lower Ext Venous Bilat Result Date: 2018 IMPRESSION: No DVT in the bilateral lower extremities. 5.2 cm left popliteal cyst. 
 
Results for orders placed or performed during the hospital encounter of 18  
2D ECHO COMPLETE ADULT (TTE) W OR WO CONTR Narrative [address] [place], [address] [facility] 
[phone] Transthoracic Echocardiogram 
2D, M-mode, Doppler, and Color Doppler Patient: Mario Alberto Foot 
MR #: [id number] : 14-Mar-1933 Age: 80 years Gender: Female Study date: 2018 Account #: [de-identified] Height: 63 in 
Weight: 143.7 lb 
BSA: 1.68 mï¾² Status:Routine Location: 830 BP: 95/ 51 Allergies: ADHESIVE Sonographer:  José Miguel Gar [facility] Group:  [facility] Referring Physician: Martin Aldridge NP Reading Physician:  Jemima Nava MD [facility] [place] INDICATIONS: Atrial Fibrillation PROCEDURE: This was a routine study. A transthoracic echocardiogram was 
performed. The study included complete 2D imaging, M-mode, complete spectral 
Doppler, and color Doppler. Image quality was adequate.  
 
LEFT VENTRICLE: Size was normal. Systolic function was normal. Ejection 
fraction was estimated in the range of 55 % to 60 %. There were no regional 
wall motion abnormalities. There was moderate concentric hypertrophy. Left 
ventricular diastolic function is present. Average E/e'~27. RIGHT VENTRICLE: The size was normal. Systolic function was normal. Estimated 
peak pressure was in the range of 40-45 mmHg. LEFT ATRIUM: The atrium was moderately dilated. RIGHT ATRIUM: Size was normal. 
 
SYSTEMIC VEINS: IVC: The inferior vena cava was normal in size and course. AORTIC VALVE: The valve was trileaflet. Leaflets exhibited mild  
calcification. The aortic valve area by the continuity equation was 1.3 cm2. The peak  
velocity 
was 2.45 m/s. The mean pressure gradient was 15 mmHg. The peak pressure 
gradient was 24 mmHg. There was mild stenosis. MITRAL VALVE: There was mild calcification. There was no evidence for  
stenosis. There was mild regurgitation. TRICUSPID VALVE: The valve structure was normal. There was no evidence for 
stenosis. There was moderate to severe regurgitation. PULMONIC VALVE: Not well visualized. There was no evidence for stenosis. There 
was no insufficiency. PERICARDIUM: There was no pericardial effusion. AORTA: The root exhibited normal size. SUMMARY: 
 
-  Left ventricle: Systolic function was normal. Ejection fraction was 
estimated in the range of 55 % to 60 %. There were no regional wall motion 
abnormalities. There was moderate concentric hypertrophy. -  Left atrium: The atrium was moderately dilated. -  Aortic valve: There was mild stenosis. The aortic valve area by the 
continuity equation was 1.3 cm2. 
 
-  Mitral valve: There was mild regurgitation. 
 
-  Tricuspid valve: There was moderate to severe regurgitation. SYSTEM MEASUREMENT TABLES 
 
2D mode AoR Diam (2D): 2.4 cm 
LA Dimension (2D): 4 cm Left Atrium Systolic Volume Index; Method of Disks, Biplane; 2D mode;: 38.6 
ml/m2 IVS/LVPW (2D): 1.1 IVSd (2D): 1.5 cm LVIDd (2D): 3.8 cm LVIDs (2D): 2.8 cm 
LVOT Area (2D): 3.1 cm2 LVPWd (2D): 1.5 cm Tissue Doppler Imaging LV Peak Early Gonzalez Tissue Andreas; Mean; Lateral MA (TDI): 6.3 cm/s LV Peak Early Gonzalez Tissue Andreas; Medial MA (TDI): 5.1 cm/s Unspecified Scan Mode Peak Grad; Mean; Antegrade Flow: 20 mm[Hg] Vmax; Antegrade Flow: 245 cm/s LVOT Diam: 2 cm 
MV Peak Andreas/LV Peak Tissue Andreas E-Wave; Lateral MA: 24.6 MV Peak Andreas/LV Peak Tissue Andreas E-Wave; Medial MA: 30 Peak Grad; Mean; Antegrade Flow: 10 mm[Hg] Vmax; Antegrade Flow: 159 cm/s Prepared and signed by 
 
Chika Ibarra MD [facility] [place] Signed 31-Jul-2018 14:17:20 Labs and Studies from previous 24 hours have been personally reviewed by myself   
ASSESSMENT Active Hospital Problems Diagnosis Date Noted  Pleural effusion, right 07/31/2018  Shortness of breath 07/30/2018  Pulmonary edema 07/30/2018  Hypertension, benign 01/19/2016  CAD (coronary artery disease) 01/19/2016 ASCAD - 50% LAD and RCA by cath 2 years ago  Paroxysmal atrial fibrillation ([facility] 01/19/2016  Chronic anemia 06/26/2013  History of ileostomy 05/16/2013  ESRD (end stage renal disease) on dialysis ([facility] 05/16/2013  Pleural effusion, left 07/02/2009 Hospital Problems as of 7/31/2018  Date Reviewed: 7/31/2018 Codes Class Noted - Resolved POA Pleural effusion, right ICD-10-CM: J90 ICD-9-CM: 511.9  7/31/2018 - Present Unknown * (Principal)Shortness of breath ICD-10-CM: R06.02 
ICD-9-CM: 786.05  7/30/2018 - Present Yes Pulmonary edema ICD-10-CM: J81.1 ICD-9-CM: 860  7/30/2018 - Present Yes Hypertension, benign ICD-10-CM: I10 
ICD-9-CM: 401.1  1/19/2016 - Present Yes CAD (coronary artery disease) ICD-10-CM: I25.10 ICD-9-CM: 414.00  1/19/2016 - Present Yes Overview Addendum 1/19/2016  1:33 PM by Matt Chapman ASCAD - 50% LAD and RCA by cath 2 years ago  Paroxysmal atrial fibrillation ([facility] ICD-10-CM: I48.0 ICD-9-CM: 427.31  1/19/2016 - Present Yes Atrial fibrillation, chronic (HCC) ICD-10-CM: I48.2 ICD-9-CM: 427.31  1/14/2016 - Present Chronic anemia (Chronic) ICD-10-CM: D64.9 ICD-9-CM: 285.9  6/26/2013 - Present Yes ESRD (end stage renal disease) on dialysis ([facility] ICD-10-CM: N18.6, Z99.2 ICD-9-CM: 585.6, V45.11  5/16/2013 - Present Yes History of ileostomy (Chronic) ICD-10-CM: F26.679 ICD-9-CM: V45.89  5/16/2013 - Present Yes Pleural effusion, left ICD-10-CM: J90 ICD-9-CM: 511.9  7/2/2009 - Present Yes A/P: 
-pulmonary edema 
-pleural effusions Specialties evaluation appreciated Clinically improving CTA with no PE. Troponin stable x 3 S/p HD and thoracocentesis. Work up pending Wean O2 as tolerated 
 
-History of CAD Cont ASA 
  
-HTN Controlled Cont current meds 
 
-aFIB Rate controlled Cont bb and Cardizem. Refused to be on [facility] 
 
-Chronic anemia Stable likely AOCD DVT Prophylaxis: heparin CODE Status: Full Plan of Care Discussed with: patient. Care team. 
 
 
Carmelina Galdamez MD 
07/31/18

## 2018-07-31 NOTE — PROGRESS NOTES
Problem: Mobility Impaired (Adult and Pediatric) Goal: *Acute Goals and Plan of Care (Insert Text) Discharge Goals: 
(1.)Ms. Vidal Mcneil will move from supine to sit and sit to supine , scoot up and down and roll side to side in bed with INDEPENDENT within 3 treatment day(s). (2.)Ms. Vidal Mcneil will transfer from bed to chair and chair to bed with MODIFIED INDEPENDENT using the least restrictive device within 3 treatment day(s). (3.)Ms. Vidal Mcneil will ambulate with MODIFIED INDEPENDENCE for 250+ feet with stable O2 sats and with the least restrictive device within 3 treatment day(s). ________________________________________________________________________________________________ PHYSICAL THERAPY: Initial Assessment, Treatment Day: Day of Assessment, AM 7/31/2018 INPATIENT: Hospital Day: 2 [place] / Product Type: Medicare /  
  
NAME/AGE/GENDER: Bruno Molina is a 80 y.o. female PRIMARY DIAGNOSIS: Shortness of breath Shortness of breath Shortness of breath ICD-10: Treatment Diagnosis:  
 · Generalized Muscle Weakness (M62.81) · Difficulty in walking, Not elsewhere classified (R26.2) · History of falling (Z91.81) Precaution/Allergies: 
Adhesive ASSESSMENT:  
 
Ms. Vidal Mcneil is a pleasant 80 y.o. Female presenting with primary diagnosis of SOB. Pt is supine in bed upon contact iwht daughter at bedside, A&O x 4, and agreeable to PT Evaluation. Pt is on 2L O2 NC with O2 sats of 98% and not on supplemental O2 at home, so PT decreased O2 to 1L. Pt states she lives by herself in single story home with a ramp to enter, and a walk in shower with grab bars and seat. Pt states she is independent with ADLs and ambulation, though she admits to cruising on furniture sometimes, and she drives. Pt states he daughter lives close by, and comes to stay the night 3 times a week. Pt reports one recent fall due to slipping in the shower.  Pt transferred from supine to sitting EOB with SBA and additional time. B LE assessment indicates AROM WFL and strength generally decreased but WFL, with decreased sensation in R great toe. Pt performed STS with SBA, demonstrating good standing balance with RW for support. Pt O2 sats were 99% on 1L O2, so PT removed O2 prior to mobility. Pt ambulated 90ft in hallway with RW and SBA, demonstrating slow, shuffling gait with decreased step clearance but otherwise steady and O2 sats were 92-93%. Pt ambulated additional 90ft, with O2 sats of 90% after. PT replaced 1L O2 NC and educated pt on deep breathing techniques. Pt ambulated additional 70ft back to bedside chair, with O2 sats of 95% on 1 L O2 after mobility. PT removed O2 again to assess O2 demands in sitting and post mobility. Educated pt on safety awareness, use of AD for improved safety, and pacing. Pt O2 sats were 95-97% after being on room air for ~5 minutes, so left pt off oxygen with nursing notified. Discussed pt with NP. Pt left upright in chair with all needs met and within reach with daughter and NP at bedside and nursing notified. Koffi Da Silva will benefit from skilled PT (medically necessary) to address decreased strength, decreased balance, decreased functional tolerance, decreased cardiopulmonary endurance affecting participation in basic ADLs and functional tasks. Pt may benefit from skilled PT upon DC from hospital. 
 
This section established at most recent assessment PROBLEM LIST (Impairments causing functional limitations): 1. Decreased Strength 2. Decreased ADL/Functional Activities 3. Decreased Transfer Abilities 4. Decreased Ambulation Ability/Technique 5. Decreased Balance 6. Decreased Activity Tolerance 7. Decreased Pacing Skills 8. Decreased Work Simplification/Energy Conservation Techniques 9.  Decreased Collier with Home Exercise Program 
 INTERVENTIONS PLANNED: (Benefits and precautions of physical therapy have been discussed with the patient.) 1. Balance Exercise 2. Bed Mobility 3. Family Education 4. Gait Training 5. Home Exercise Program (HEP) 6. Manual Therapy 7. Neuromuscular Re-education/Strengthening 8. Range of Motion (ROM) 9. Therapeutic Activites 10. Therapeutic Exercise/Strengthening 11. Transfer Training 12. Group Therapy TREATMENT PLAN: Frequency/Duration: 3 times a week for duration of hospital stay Rehabilitation Potential For Stated Goals: Good RECOMMENDED REHABILITATION/EQUIPMENT: (at time of discharge pending progress): Due to the probability of continued deficits (see above) this patient will likely need continued skilled physical therapy after discharge. Equipment:  
 None at this time HISTORY:  
History of Present Injury/Illness (Reason for Referral): Ms. Vidal Mcneil is a 79 yo F with PMHx of  CAD, pAFib on no OAC because she refused, HTN, HLP, ESRD on HD MWF, Chronic anemia due ro CKD, Chron's disease s/p ileostomy who complains of sudden onset of SOB that started around 3AM associated with nonproductive cough. Denies chest pain or spikes of fevers. Complains of increased b/l LE edema. LAst HD was Friday, uneventful. ED work-up showed WBC:6.8, Cr:9.01, K:5.9, Trop I:0.08. CXR with pulmonary edema Received IV lasix and . Nephrology consulted for emergency HD.  
  
10+ point ROS done and is negative except as noted in HPI Past Medical History/Comorbidities: Ms. Vidal Mcneil  has a past medical history of Abdominal pain, chronic, right lower quadrant (5/16/2013); Anticoagulated on Coumadin; Aortic stenosis (06/26/2015); Arthritis; Atrial fibrillation, chronic ([address] (1/14/2016); CAD (coronary artery disease) (06/2013); Carotid stenosis without Infarct (1/19/2016); Chronic anemia (6/26/2013); Chronic kidney disease; Chronic renal failure (1/19/2016); Crohn's disease ([address] Dyslipidemia (6/26/2013); Edema (1/19/2016); ESRD (end stage renal disease) on dialysis ([facility] [address] (05/16/2013);  Former smoker; GERD (gastroesophageal reflux disease); Gout (6/26/2013); History of ileostomy (5/16/2013); History of peritonitis (2013); History of sepsis (2009); Hypercholesteremia; Hyperlipidemia (1/19/2016); Hypertension; Hypokalemia (5/16/2013); Hypomagnesemia (6/23/2009); Ileostomy in place [facility]); Intractable nausea and vomiting (5/16/2013); Leukocytosis (5/16/2013); NSTEMI (non-ST elevated myocardial infarction) ([facility] (6/26/2013); Palpitations (1/19/2016); Paroxysmal atrial fibrillation ([facility] [address] [facility] (1/19/2016); Paroxysmal tachycardia ([facility] (1/19/2016); S/P AAA repair; Serum lipase elevation (5/16/2013); and Tricuspid regurgitation (EF 55-60%). She also has no past medical history of Adverse effect of anesthesia; Difficult intubation; Malignant hyperthermia due to anesthesia; or Pseudocholinesterase deficiency. Ms. Lucas Cabrera  has a past surgical history that includes hx cataract removal (Bilateral, 2005); hx lap cholecystectomy (2013); pr abdomen surgery proc unlisted; hx colectomy (1983); hx breast biopsy (Bilateral,  ); vascular surgery procedure unlist (Left, 2009); vascular surgery procedure unlist; vascular surgery procedure unlist (Right, 2014); hx aaa repair (2013); and colonoscopy (N/A, 10/20/2016). Social History/Living Environment:  
Home Environment: Private residence # Steps to Enter: 0 Wheelchair Ramp: Yes One/Two Story Residence: One story Living Alone: Yes Support Systems: Child(mt) Patient Expects to be Discharged to[de-identified] Private residence Current DME Used/Available at Home: Walker, rolling, Shower chair, Janeth Steffen, straight Tub or Shower Type: Tub/Shower combination Prior Level of Function/Work/Activity: 
Independent with ADLs and ambulation, one recent fall, lives alone, no steps to enter, drives, daughter lives close by  
Number of Personal Factors/Comorbidities that affect the Plan of Care: 3+: HIGH COMPLEXITY EXAMINATION:  
Most Recent Physical Functioning:  
Gross Assessment: 
AROM: Within functional limits Strength: Generally decreased, functional 
Coordination: Within functional limits Tone: Normal 
Sensation: Impaired (R great toe diminished, intact otherwise) Posture: 
  
Balance: 
Sitting: Intact Standing: Impaired Standing - Static: Good Standing - Dynamic : Fair Bed Mobility: 
Rolling: Stand-by assistance Supine to Sit: Stand-by assistance Scooting: Stand-by assistance Wheelchair Mobility: 
  
Transfers: 
Sit to Stand: Stand-by assistance Stand to Sit: Stand-by assistance Gait: 
  
Base of Support: Narrowed Speed/Deysi: Slow;Shuffled Step Length: Left shortened;Right shortened Gait Abnormalities: Decreased step clearance Distance (ft): 180 Feet (ft) (+70) Assistive Device: Walker, rolling Ambulation - Level of Assistance: Stand-by assistance Body Structures Involved: 1. Heart 2. Lungs 3. Bones 4. Joints 5. Muscles 6. Ligaments Body Functions Affected: 1. Cardio 2. Respiratory 3. Neuromusculoskeletal 
4. Movement Related Activities and Participation Affected: 1. Mobility 2. Self Care 3. Domestic Life 4. Interpersonal Interactions and Relationships 5. Community, Social and [place] Number of elements that affect the Plan of Care: 4+: HIGH COMPLEXITY CLINICAL PRESENTATION:  
Presentation: Evolving clinical presentation with changing clinical characteristics: MODERATE COMPLEXITY CLINICAL DECISION MAKING:  
[facility] 6 Clicks Basic Mobility Inpatient Short Form How much difficulty does the patient currently have. .. Unable A Lot A Little None 1. Turning over in bed (including adjusting bedclothes, sheets and blankets)? [] 1   [] 2   [] 3   [x] 4  
2. Sitting down on and standing up from a chair with arms ( e.g., wheelchair, bedside commode, etc.)   [] 1   [] 2   [x] 3   [] 4  
3. Moving from lying on back to sitting on the side of the bed? [] 1   [] 2   [] 3   [x] 4 How much help from another person does the patient currently need. .. Total A Lot A Little None 4. Moving to and from a bed to a chair (including a wheelchair)? [] 1   [] 2   [x] 3   [] 4  
5. Need to walk in hospital room? [] 1   [] 2   [x] 3   [] 4  
6. Climbing 3-5 steps with a railing? [] 1   [x] 2   [] 3   [] 4  
© 2007, Trustees of [address] [place] [address] [address] [address], under license to [employer]. All rights reserved Score:  Initial: 19 Most Recent: X (Date: -- ) Interpretation of Tool:  Represents activities that are increasingly more difficult (i.e. Bed mobility, Transfers, Gait). Score 24 23 22-20 19-15 14-10 9-7 6 Modifier CH CI CJ CK CL CM CN   
 
? Mobility - Walking and Moving Around:  
  - CURRENT STATUS: CK - 40%-59% impaired, limited or restricted  - GOAL STATUS: CJ - 20%-39% impaired, limited or restricted  - D/C STATUS:  ---------------To be determined--------------- [place] / [place] B / Product Type: Medicare /   
 
Medical Necessity:    
· Patient demonstrates good rehab potential due to higher previous functional level. Reason for Services/Other Comments: 
· Patient continues to require skilled intervention due to decreased activity tolerance. Use of outcome tool(s) and clinical judgement create a POC that gives a: Clear prediction of patient's progress: LOW COMPLEXITY  
  
 
 
 
TREATMENT:  
(In addition to Assessment/Re-Assessment sessions the following treatments were rendered) Pre-treatment Symptoms/Complaints:  \"I'm ready to go home\" Pain: Initial:  
Pain Intensity 1: 0  Post Session:  0/10 In addition to PT Evaluation: 
Therapeutic Activity: (    8 minutes): Therapeutic activities including Bed transfers, Chair transfers, Ambulation on level ground and education for pacing, deep breathing techniques, and safety awareness to improve mobility, strength, balance and coordination. Required minimal   verbal cuing to promote dynamic balance in standing. Braces/Orthotics/Lines/Etc:  
· O2 Device: Room air Treatment/Session Assessment:   
· Response to Treatment:  See above · Interdisciplinary Collaboration:  
o Physical Therapist 
o Registered Nurse 
o NP 
· After treatment position/precautions:  
o Up in chair 
o Bed/Chair-wheels locked 
o Bed in low position 
o Call light within reach 
o RN notified 
o Family at bedside 
o NP at bedside · Compliance with Program/Exercises: Will assess as treatment progresses. · Recommendations/Intent for next treatment session: \"Next visit will focus on advancements to more challenging activities and reduction in assistance provided\". Total Treatment Duration: PT Patient Time In/Time Out Time In: 1118 Time Out: 1149 Christiano Tamayo, PT

## 2018-07-31 NOTE — PROGRESS NOTES
CONSULT NOTE Raleigh Grant 7/31/2018 Date of Admission:  7/30/2018 The patient's chart is reviewed and the patient is discussed with the staff. Subjective:  
 
Patient is a 80 y.o.  female, PMH ESRD on dialysis, Crohn's disease, NSTEMI, HTN, paroxysmal atrial fibrillation, and chronic anemia, seen and evaluated at the request of Dr. Gerald Wynn for pleural effusions. The patient  States that around 3 am yesterday morning she woke up short of breath and was unable to lie flat. The patient came to the ED for further evaluation and was noted to have significant pulmonary edema per CXR. CT chest w/ contrast was performed yesterday and significant bilateral pleural effusions were noted. The patient is a [facility] patient; she denies missing any dialysis appointments recently. She is not on any anticoagulation; she states she stopped taking Coumadin (for atrial fib) at least 6 months ago. She is on baby aspirin. She denies any fevers, chills, nausea, vomiting, or diarrhea. She does see [facility]. Irina Lowery with [facility]. Review of Systems Pertinent items are noted in HPI. Patient Active Problem List  
Diagnosis Code  Hypomagnesemia E83.42  
 Pleural effusion, bilateral J90  
 Crohn's disease (HCC) K50.90  
 Intractable nausea and vomiting R11.2  Abdominal pain, chronic, right lower quadrant R10.31, G89.29  
 Leukocytosis D72.829  
 ESRD (end stage renal disease) on dialysis (HCC) N18.6, Z99.2  Hypokalemia E87.6  Serum lipase elevation R74.8  History of ileostomy Z98.890  Peritonitis, dialysis-associated ([facility] [address] [facility] T85.71XA  Symptomatic cholelithiasis K80.20  
 NSTEMI (non-ST elevated myocardial infarction) ([facility]) I21.4  Dyslipidemia E78.5  Gout M10.9  Chronic anemia D64.9  Atrial fibrillation, chronic (HCC) I48.2  Edema R60.9  Anemia D64.9  Chronic renal failure N18.9  Hypertension, benign I10  Aortic stenosis I35.0  Palpitations R00.2  Carotid stenosis without Infarct I65.29  
 CAD (coronary artery disease) I25.10  Hyperlipidemia E78.5  Paroxysmal tachycardia (HCC) I47.9  Paroxysmal atrial fibrillation (HCC) I48.0  Shortness of breath R06.02  
 Pulmonary edema J81.1 Prior to Admission Medications Prescriptions Last Dose Informant Patient Reported? Taking?  
aspirin delayed-release 81 mg tablet   Yes Yes Sig: Take  by mouth daily. carvedilol (COREG) 25 mg tablet   No Yes Sig: Take 1 Tab by mouth two (2) times a day. Patient taking differently: Take 12.5 mg by mouth two (2) times a day. dilTIAZem CD (CARDIZEM CD) 120 mg ER capsule   No Yes Sig: Take 1 Cap by mouth daily. diphenhydrAMINE (BENADRYL) 50 mg tablet   Yes Yes Sig: Take 50 mg by mouth nightly as needed for Sleep.  
esomeprazole (NEXIUM) 20 mg capsule   Yes Yes Sig: Take  by mouth daily. lidocaine-prilocaine (EMLA) topical cream   Yes Yes Sig: Apply  to affected area as needed for Pain. sevelamer carbonate (RENVELA) 800 mg tab tab   Yes Yes Sig: Take 1,600 mg by mouth three (3) times daily (with meals). Indications: Renal Osteodystrophy with Hyperphosphatemia  
zolpidem (AMBIEN) 10 mg tablet   Yes Yes Sig: Take 5 mg by mouth nightly. Indications: SLEEP-ONSET INSOMNIA Facility-Administered Medications: None Past Medical History:  
Diagnosis Date  Abdominal pain, chronic, right lower quadrant 5/16/2013  Anticoagulated on Coumadin   
 was told to hold for 5 days- held as 10/15/16  Aortic stenosis 06/26/2015  
 mild to moderate per echo  Arthritis   
 minimal  
 Atrial fibrillation, chronic (HCC) 1/14/2016  CAD (coronary artery disease) 06/2013  
 mild MI   
 Carotid stenosis without Infarct 1/19/2016  Chronic anemia 6/26/2013  Chronic kidney disease ESRD stage 4, M, W, F dialysis. Karin Hanna in  [facility]  Chronic renal failure 1/19/2016  Crohn's disease [facility])   
 colectomy  Dyslipidemia 6/26/2013  Edema 1/19/2016  ESRD (end stage renal disease) on dialysis ([facility] [address] 05/16/2013  
 right arm functioning- fistula----M-W-F AT [facility]  
 Former smoker  GERD (gastroesophageal reflux disease)   
 controlled on meds takes prilosec  Gout 6/26/2013  History of ileostomy 5/16/2013  History of peritonitis 2013  
 dialysis associated  History of sepsis 2009  Hypercholesteremia  Hyperlipidemia 1/19/2016  Hypertension   
  well controlled by medication  Hypokalemia 5/16/2013  Hypomagnesemia 6/23/2009  Ileostomy in place [facility])  Intractable nausea and vomiting 5/16/2013  Leukocytosis 5/16/2013  
 NSTEMI (non-ST elevated myocardial infarction) ([facility] [address] 6/26/2013  Palpitations 1/19/2016  Paroxysmal atrial fibrillation ([address] 1/19/2016  Paroxysmal tachycardia ([facility] [address] 1/19/2016  S/P AAA repair \" aneurysm in my stomach repaired\"  Serum lipase elevation 5/16/2013  Tricuspid regurgitation EF 55-60%  
 moderate to severe per echo 6/26/15 Past Surgical History:  
Procedure Laterality Date  ABDOMEN SURGERY PROC UNLISTED    
 insertion and removal of infected PD cath  COLONOSCOPY N/A 10/20/2016 ILEOSCOPY THROUGH OSTOMY/ 25 performed by Chidi Hinton MD at [address] HX AAA REPAIR  2013  HX BREAST BIOPSY Bilateral    
  x3 on left breast  
 HX CATARACT REMOVAL Bilateral 2005  
 wit IOL  HX COLECTOMY  1983  
 colon removed / ileostomy  HX LAP CHOLECYSTECTOMY  2013  VASCULAR SURGERY PROCEDURE UNLIST Left 2009  
 fistulagram 8/09, LUE  VASCULAR SURGERY PROCEDURE UNLIST    
 7/09 L subclavian hemodialysis cath  VASCULAR SURGERY PROCEDURE UNLIST Right 2014  
 fistula Social History Social History  Marital status:  Spouse name: N/A  
 Number of children: N/A  
 Years of education: N/A Occupational History  Not on file.   
 
Social History Main Topics  Smoking status: Former Smoker Packs/day: 0.25 Years: 5.00 Quit date: 8/22/1971  Smokeless tobacco: Never Used Comment: quit years ago  Alcohol use No  
 Drug use: No  
 Sexual activity: Not on file Other Topics Concern  Not on file Social History Narrative Family History Problem Relation Age of Onset  Cancer Mother  Stroke Father  Hypertension Father  Heart Disease Brother  Heart Disease Brother  Stroke Sister  Hypertension Sister Allergies Allergen Reactions  Adhesive Other (comments) Skin tears Current Facility-Administered Medications Medication Dose Route Frequency  aspirin delayed-release tablet 81 mg  81 mg Oral DAILY  carvedilol (COREG) tablet 25 mg  25 mg Oral BID WITH MEALS  dilTIAZem CD (CARDIZEM CD) capsule 120 mg  120 mg Oral DAILY  pantoprazole (PROTONIX) tablet 40 mg  40 mg Oral ACB  sevelamer carbonate (RENVELA) tab 1,600 mg  1,600 mg Oral TID WITH MEALS  zolpidem (AMBIEN) tablet 5 mg  5 mg Oral QHS PRN  
 sodium chloride (NS) flush 5-10 mL  5-10 mL IntraVENous Q8H  
 sodium chloride (NS) flush 5-10 mL  5-10 mL IntraVENous PRN  
 acetaminophen (TYLENOL) tablet 650 mg  650 mg Oral Q4H PRN  
 heparin (porcine) injection 5,000 Units  5,000 Units SubCUTAneous Q8H  
 hydrALAZINE (APRESOLINE) 20 mg/mL injection 10 mg  10 mg IntraVENous Q6H PRN  
 alcohol 62% (NOZIN) nasal  1 Ampule  1 Ampule Topical Q12H Objective:  
 
Vitals:  
 07/31/18 0326 07/31/18 0724 07/31/18 1125 07/31/18 1204 BP: 112/66 130/76 95/51 Pulse: 83 96 98 Resp: 20 19 17 Temp: 98 °F (36.7 °C) 97.7 °F (36.5 °C) 97.8 °F (36.6 °C) SpO2: 95% 95% 93% 95% Weight: 144 lb 3.2 oz (65.4 kg) Height: PHYSICAL EXAM  
 
Constitutional:  the patient is well developed and in no acute distress EENMT:  Sclera clear, pupils equal, oral mucosa moist 
Respiratory: Crackles, decreased lower, on room air Cardiovascular:  RRR without M,G,R 
Gastrointestinal: soft and non-tender; with positive bowel sounds. Musculoskeletal: warm without cyanosis. There is trace bilateral lower leg edema. Skin:  no jaundice or rashes, no wounds Neurologic: no gross neuro deficits Psychiatric:  alert and oriented x 3, pleasant and following commands CXR:   
7/30/2018 IMPRESSION:  PULMONARY EDEMA. CT CHEST W/CONTRAST: 
7/30/2018 IMPRESSION:  1. No pulmonary embolism. 2. Significant bilateral pleural effusions with lobe atelectasis. Recent Labs  
   07/31/18 
 0339  07/30/18 
 1102 WBC  5.4  6.8 HGB  10.5*  12.8 HCT  33.4*  39.7 PLT  190  263 Recent Labs  
   07/31/18 
 0339  07/30/18 
 2343  07/30/18 
 1102 NA  140   --   137  
K  4.5   --   5.9*  
CL  100   --   101 GLU  95   --   155* CO2  28   --   24 BUN  16   --   31* CREA  5.97*   --   9.02* MG  2.0   --    --   
CA  7.8*   --   8.2*  
TROIQ  0.10*  0.10*  0.08* ALB   --    --   4.1 TBILI   --    --   0.7 ALT   --    --   20 SGOT   --    --   27 No results for input(s): PH, PCO2, PO2, HCO3 in the last 72 hours. No results for input(s): LCAD, LAC in the last 72 hours. Assessment:  (Medical Decision Making) Hospital Problems  Date Reviewed: 7/31/2018 Codes Class Noted POA * (Principal)Shortness of breath ICD-10-CM: R06.02 
ICD-9-CM: 786.05  7/30/2018 Yes Bilateral pleural effusions per CT chest  
 Pulmonary edema ICD-10-CM: J81.1 ICD-9-CM: 127  7/30/2018 Yes Emergency HD done in ED Hypertension, benign ICD-10-CM: I10 
ICD-9-CM: 401.1  1/19/2016 Yes Chronic CAD (coronary artery disease) ICD-10-CM: I25.10 ICD-9-CM: 414.00  1/19/2016 Yes Overview Addendum 1/19/2016  1:33 PM by Nick Sedro [place] ASCAD - 50% LAD and RCA by cath 2 years ago Chronic Paroxysmal atrial fibrillation (HCC) ICD-10-CM: I48.0 ICD-9-CM: 427.31  1/19/2016 Yes Chronic  Chronic anemia (Chronic) ICD-10-CM: D64.9 ICD-9-CM: 285.9  6/26/2013 Yes Chronic ESRD (end stage renal disease) on dialysis ([facility] [address] ICD-10-CM: N18.6, Z99.2 ICD-9-CM: 585.6, V45.11  5/16/2013 Yes HD Mon-Wed-Fri History of ileostomy (Chronic) ICD-10-CM: N18.505 ICD-9-CM: V45.89  5/16/2013 Yes Chronic Pleural effusion, bilateral ICD-10-CM: J90 ICD-9-CM: 511.9  7/2/2009 Yes Plans for thoracentesis Plan:  (Medical Decision Making) --Plans for thoracentesis today 
--Dialysis per Nephrology 
--Consult to Cardiology for atrial fib, patient no longer on [address] [place] [address] Road 
--O2 as needed More than 50% of the time documented was spent in face-to-face contact with the patient and in the care of the patient on the floor/unit where the patient is located. Thank you very much for this referral.  We appreciate the opportunity to participate in this patient's care. Will follow along with above stated plan. Allie Chadwick NP Lungs: decreased sounds b/l R > left. With increased dullness in Right chest vs left Heart S1 and S2 audible, no murmers or rubs appreciated Other Plan for b/l thoracentesis today since has orthopnea Get overnight on RA if has b/l taps Does have AFIB in the past ?issues now. She cannot tell if heart is racing and daughter confirms. May need adjustment. Did review recent CT with the patient and her daughter. I have spoken with and examined the patient. I have reviewed the history, examination, assessment, and plan and agree with the above. Ct Pablo MD 
 
 
This note was signed electronically. Errors are unfortunately her likely due to dictation software.

## 2018-07-31 NOTE — PROCEDURES
US Guided Thoracentesis Procedure Note--UNILATERAL    Time Out -- Correct patient identified and Everyone Agrees. For procedure sterile techniques used including: Sterile gown, gloves, cap, mask, drapes and chlorhexidine to the insertions site/location. Procedure:   Left Thoracentesis with ultrasound guidance    Indication:   Left Pleural effusion     Summary:    After informed consent was obtained, the patient was positioned upright in the usual fashion.  Ultrasound was used to identify the optimal spot for pleural drainage.     The  left  intercostal space was anesthetized with 1% Lidocaine to achieve adequate anesthesia.  The pleural space was entered with orange/red fluid identified.  Lidocaine was instilled within the pleural space as well.  A small stab incision was made at the site of local anesthesia and the thoracentesis catheter was advanced into the pleural space. Approximately 500 ml of fluid was obtained with ease.  The procedure was terminated after  pleural drainage stopped. Air was not aspirated during the procedure.  A bandaid was placed over the incision after adequate hemostasis was achieved.  A CXR is pending.     EBL -- 3 drops    Patient stable post procedure    The pleural fluid will be sent for :protein, LDH, cytology, cell count, AFB.     Signed By: Saray Blount MD

## 2018-07-31 NOTE — PROCEDURES
US Guided Thoracentesis Procedure Note--UNILATERAL    Time Out -- Correct patient identified and Everyone Agrees. For procedure sterile techniques used including: Sterile gown, gloves, cap, mask, drapes and chlorhexidine to the insertions site/location. Procedure:  Right Thoracentesis with ultrasound guidance    Indication:  Right  Pleural effusion     Summary:    After informed consent was obtained, the patient was positioned upright in the usual fashion.  Ultrasound was used to identify the optimal spot for pleural drainage.     The right  intercostal space was anesthetized with 1% Lidocaine to achieve adequate anesthesia.  The pleural space was entered with orange/red fluid identified.  Lidocaine was instilled within the pleural space as well.  A small stab incision was made at the site of local anesthesia and the thoracentesis catheter was advanced into the pleural space. Approximately 400 ml of fluid was obtained with ease.  The procedure was terminated after  pleural drainage stopped. Air was not aspirated during the procedure.  A bandaid was placed over the incision after adequate hemostasis was achieved.  A CXR is pending.     EBL -- 2 drops    Patient stable post procedure    No samples sent since sent from other side.     Signed By: Doris Solorzano MD

## 2018-07-31 NOTE — PROGRESS NOTES
Patient is alert and oriented X4, on O2 @ 2L n/c, appears dyspneic with exertion, patient lung sounds diminished with crackles to bases, no c/o pain or discomfort, denies needs, call light within reach.

## 2018-07-31 NOTE — PROGRESS NOTES
[facility] PROGRESS NOTE Follow up for: 
 
Subjective:  
Patient seen and examined. Chart, notes, labs, imaging, results all reviewed. Feeling a little better. Resting without distress. Denies sob, cp, n/v currently. Had cramping on HD yesterday. 2 kg UF total.  
 
ROS: 
Gen - no fever, no chills, appetite okay CV - no chest pain, no orthopnea Lung - no shortness of breath, no cough Abd - no tenderness, no nausea, no vomiting Ext - no edema Objective:  
Exam: 
Vitals:  
 07/30/18 2031 07/30/18 2349 07/31/18 0123 07/31/18 6472 BP: 108/57 102/65 112/66 130/76 Pulse: 95 85 83 96 Resp: 20 20 20 19 Temp: 98.2 °F (36.8 °C) 98 °F (36.7 °C) 98 °F (36.7 °C) 97.7 °F (36.5 °C) SpO2: 97% 92% 95% 95% Weight:   65.4 kg (144 lb 3.2 oz) Height:      
 
 
 
Intake/Output Summary (Last 24 hours) at 07/31/18 1106 Last data filed at 07/31/18 0800 Gross per 24 hour Intake              236 ml Output             2010 ml Net            -1774 ml Current Facility-Administered Medications Medication Dose Route Frequency  aspirin delayed-release tablet 81 mg  81 mg Oral DAILY  carvedilol (COREG) tablet 25 mg  25 mg Oral BID WITH MEALS  dilTIAZem CD (CARDIZEM CD) capsule 120 mg  120 mg Oral DAILY  pantoprazole (PROTONIX) tablet 40 mg  40 mg Oral ACB  sevelamer carbonate (RENVELA) tab 1,600 mg  1,600 mg Oral TID WITH MEALS  zolpidem (AMBIEN) tablet 5 mg  5 mg Oral QHS PRN  
 sodium chloride (NS) flush 5-10 mL  5-10 mL IntraVENous Q8H  
 sodium chloride (NS) flush 5-10 mL  5-10 mL IntraVENous PRN  
 acetaminophen (TYLENOL) tablet 650 mg  650 mg Oral Q4H PRN  
 heparin (porcine) injection 5,000 Units  5,000 Units SubCUTAneous Q8H  
 hydrALAZINE (APRESOLINE) 20 mg/mL injection 10 mg  10 mg IntraVENous Q6H PRN  
 alcohol 62% (NOZIN) nasal  1 Ampule  1 Ampule Topical Q12H  
 
 
EXAM 
GEN - Alert, oriented, in no distress CV - S1, S2, RRR, no rub, murmur, or gallop Lung - rales and exp wheezes bilaterally Abd - soft, nontender, BS present Ext - 1+ LE edema AVF + thrill+bruit Recent Labs  
   07/31/18 
 0339  07/30/18 
 1102 WBC  5.4  6.8 HGB  10.5*  12.8 HCT  33.4*  39.7 PLT  190  263 Recent Labs  
   07/31/18 
 0339  07/30/18 
 1102 NA  140  137  
K  4.5  5.9*  
CL  100  101 CO2  28  24 BUN  16  31* CREA  5.97*  9.02* CA  7.8*  8.2*  
GLU  95  155* MG  2.0   -- Assessment and Plan:  
ESRD on HD [facility] - s/p HD 7/30 with 2 kg UF 
- HD tomorrow per routine with UF as tolerated. Only tolerated 2 kg yesterday 2/2 low blood pressure and cramping Dyspnea 
- volume overload on CXR 
- CTA chest negative for PE, no DVT on bilat LE duplex 
- bilateral pleural effusions on CT Bilateral pleural effusions - difficult to impact with HD alone. May need pulmonary consult to consider thoracentesis A-fib 
- rate controlled on BB and CCB 
- not on [address] - per pt refusal 
 
Anemia of ESRD 
- hemoglobin in goal range for ESRD 
 
HTN 
- continue home meds ELIER Mares

## 2018-07-31 NOTE — PROGRESS NOTES
Problem: Interdisciplinary Rounds Goal: Interdisciplinary Rounds Outcome: Progressing Towards Goal 
Interdisciplinary team rounds were held 7/31/2018 with the following team members:Care Management, Nursing, Physical Therapy, Physician and Clinical Coordinator and the patient. Plan of care discussed. See clinical pathway and/or care plan for interventions and desired outcomes.

## 2018-07-31 NOTE — PHYSICIAN ADVISORY
Letter of Determination: Inpatient Status Appropriate This patient was originally hospitalized as Inpatient Status on 7/30/2018 for shortness of breath. This patient is appropriate for Inpatient Admission in accordance with CMS regulation Section 43 .3. Specifically, patient's stay is expected to be more than Two Midnights and was medically necessary. The patient's stay was medically necessary based on extreme advanced age, troponin-I of 0.10 ng/ml, and blood pressure of 96/48 mmHg. Consistent with CMS guidelines, patient meets for inpatient status. It is our recommendation that this patient's hospitalization status should be INPATIENT status.  
  
The final decision regarding the patient's hospitalization status depends on the attending physician's judgement. Estefany Taylor MD, BART, Physician Advisor [phone].

## 2018-07-31 NOTE — PROGRESS NOTES
made initial visit. Pt was alert and verbal with no pain level expressed or observed.  welcomed pt to DT and shared information about  services.  provided spiritual care through presence, pastoral conversation, and assurance of prayer.

## 2018-08-01 LAB
ANION GAP SERPL CALC-SCNC: 11 MMOL/L (ref 7–16)
BUN SERPL-MCNC: 22 MG/DL (ref 8–23)
CALCIUM SERPL-MCNC: 7.3 MG/DL (ref 8.3–10.4)
CHLORIDE SERPL-SCNC: 98 MMOL/L (ref 98–107)
CO2 SERPL-SCNC: 27 MMOL/L (ref 21–32)
CREAT SERPL-MCNC: 8.3 MG/DL (ref 0.6–1)
ERYTHROCYTE [DISTWIDTH] IN BLOOD BY AUTOMATED COUNT: 19.2 % (ref 11.9–14.6)
GLUCOSE FLD-MCNC: 149 MG/DL
GLUCOSE SERPL-MCNC: 93 MG/DL (ref 65–100)
HCT VFR BLD AUTO: 33.9 % (ref 35.8–46.3)
HGB BLD-MCNC: 10.8 G/DL (ref 11.7–15.4)
LDH FLD L TO P-CCNC: 88 U/L
MCH RBC QN AUTO: 35.2 PG (ref 26.1–32.9)
MCHC RBC AUTO-ENTMCNC: 31.9 G/DL (ref 31.4–35)
MCV RBC AUTO: 110.4 FL (ref 79.6–97.8)
PLATELET # BLD AUTO: 181 K/UL (ref 150–450)
PMV BLD AUTO: 10.3 FL (ref 10.8–14.1)
POTASSIUM SERPL-SCNC: 4.6 MMOL/L (ref 3.5–5.1)
PROT FLD-MCNC: 2.1 G/DL
RBC # BLD AUTO: 3.07 M/UL (ref 4.05–5.25)
SODIUM SERPL-SCNC: 136 MMOL/L (ref 136–145)
SPECIMEN SOURCE FLD: NORMAL
WBC # BLD AUTO: 4.8 K/UL (ref 4.3–11.1)

## 2018-08-01 PROCEDURE — 36415 COLL VENOUS BLD VENIPUNCTURE: CPT | Performed by: HOSPITALIST

## 2018-08-01 PROCEDURE — 99232 SBSQ HOSP IP/OBS MODERATE 35: CPT | Performed by: INTERNAL MEDICINE

## 2018-08-01 PROCEDURE — 80048 BASIC METABOLIC PNL TOTAL CA: CPT | Performed by: HOSPITALIST

## 2018-08-01 PROCEDURE — 74011250637 HC RX REV CODE- 250/637: Performed by: INTERNAL MEDICINE

## 2018-08-01 PROCEDURE — 65660000000 HC RM CCU STEPDOWN

## 2018-08-01 PROCEDURE — 74011250637 HC RX REV CODE- 250/637: Performed by: HOSPITALIST

## 2018-08-01 PROCEDURE — 74011250636 HC RX REV CODE- 250/636: Performed by: HOSPITALIST

## 2018-08-01 PROCEDURE — 90935 HEMODIALYSIS ONE EVALUATION: CPT

## 2018-08-01 PROCEDURE — 85027 COMPLETE CBC AUTOMATED: CPT | Performed by: HOSPITALIST

## 2018-08-01 RX ORDER — METOPROLOL TARTRATE 50 MG/1
50 TABLET ORAL 2 TIMES DAILY
Status: DISCONTINUED | OUTPATIENT
Start: 2018-08-01 | End: 2018-08-02 | Stop reason: HOSPADM

## 2018-08-01 RX ORDER — ZOLPIDEM TARTRATE 5 MG/1
5 TABLET ORAL
Status: DISCONTINUED | OUTPATIENT
Start: 2018-08-01 | End: 2018-08-02 | Stop reason: HOSPADM

## 2018-08-01 RX ORDER — DILTIAZEM HYDROCHLORIDE 180 MG/1
180 CAPSULE, COATED, EXTENDED RELEASE ORAL DAILY
Status: DISCONTINUED | OUTPATIENT
Start: 2018-08-02 | End: 2018-08-02 | Stop reason: HOSPADM

## 2018-08-01 RX ADMIN — PANTOPRAZOLE SODIUM 40 MG: 40 TABLET, DELAYED RELEASE ORAL at 06:12

## 2018-08-01 RX ADMIN — DILTIAZEM HYDROCHLORIDE 120 MG: 120 CAPSULE, COATED, EXTENDED RELEASE ORAL at 11:53

## 2018-08-01 RX ADMIN — CARVEDILOL 25 MG: 25 TABLET, FILM COATED ORAL at 11:53

## 2018-08-01 RX ADMIN — Medication 1 AMPULE: at 11:52

## 2018-08-01 RX ADMIN — ZOLPIDEM TARTRATE 5 MG: 5 TABLET ORAL at 21:34

## 2018-08-01 RX ADMIN — Medication 10 ML: at 06:11

## 2018-08-01 RX ADMIN — Medication 10 ML: at 21:36

## 2018-08-01 RX ADMIN — Medication 1 AMPULE: at 21:35

## 2018-08-01 RX ADMIN — ASPIRIN 81 MG: 81 TABLET, COATED ORAL at 11:53

## 2018-08-01 RX ADMIN — SEVELAMER CARBONATE 1600 MG: 800 TABLET, FILM COATED ORAL at 11:53

## 2018-08-01 RX ADMIN — HEPARIN SODIUM 5000 UNITS: 5000 INJECTION INTRAVENOUS; SUBCUTANEOUS at 23:50

## 2018-08-01 RX ADMIN — SEVELAMER CARBONATE 1600 MG: 800 TABLET, FILM COATED ORAL at 17:14

## 2018-08-01 RX ADMIN — HEPARIN SODIUM 5000 UNITS: 5000 INJECTION INTRAVENOUS; SUBCUTANEOUS at 16:01

## 2018-08-01 RX ADMIN — Medication 5 ML: at 16:05

## 2018-08-01 NOTE — PROGRESS NOTES
PT Note: 
 
Chart reviewed and attempted PT treatment this PM. Pt declined, stating she just recently returned from dialysis and is exhausted. Will check back later time/date as schedule allows.  
 
Thanks, 
HERB RandallT

## 2018-08-01 NOTE — DIALYSIS
TRANSFER IN - DIALYSIS Received patient in dialysis unit from [facility] (unit) for ordered procedure. Consent verified for renal replacement therapy. Patient alert and oriented and vital signs stable. /45 P93 Hemodialysis initiated using right upper arm AVF and 15 g needles. Machine settings per MD order. Will monitor during treatment.

## 2018-08-01 NOTE — PROGRESS NOTES
[facility] PROGRESS NOTE Follow up for: 
 
Subjective:  
Patient seen and examined. Chart, notes, labs, imaging, results all reviewed. Seen on dialysis. Goal UF 3 kg. Tolerating well. No cramping. On room air. S/p bilateral thoracentesis yesterday. In a-fib with 's. She states \"I always do that on dialysis\". Denies sob, cp, n/v currently. ROS: 
Gen - no fever, no chills, appetite okay CV - no chest pain, no orthopnea Lung - no shortness of breath, no cough Abd - no tenderness, no nausea, no vomiting Ext - + edema Objective:  
Exam: 
Vitals:  
 08/01/18 9376 08/01/18 0801 08/01/18 5699 08/01/18 0484 BP: 110/45 145/72 143/74 138/77 Pulse: 93 96 (!) 113 (!) 121 Resp:      
Temp:      
SpO2:      
Weight:      
Height:      
 
 
 
Intake/Output Summary (Last 24 hours) at 08/01/18 2530 Last data filed at 07/31/18 3451 Gross per 24 hour Intake             1136 ml Output                0 ml Net             1136 ml  
 
 
Current Facility-Administered Medications Medication Dose Route Frequency  aspirin delayed-release tablet 81 mg  81 mg Oral DAILY  carvedilol (COREG) tablet 25 mg  25 mg Oral BID WITH MEALS  dilTIAZem CD (CARDIZEM CD) capsule 120 mg  120 mg Oral DAILY  pantoprazole (PROTONIX) tablet 40 mg  40 mg Oral ACB  sevelamer carbonate (RENVELA) tab 1,600 mg  1,600 mg Oral TID WITH MEALS  zolpidem (AMBIEN) tablet 5 mg  5 mg Oral QHS PRN  
 sodium chloride (NS) flush 5-10 mL  5-10 mL IntraVENous Q8H  
 sodium chloride (NS) flush 5-10 mL  5-10 mL IntraVENous PRN  
 acetaminophen (TYLENOL) tablet 650 mg  650 mg Oral Q4H PRN  
 heparin (porcine) injection 5,000 Units  5,000 Units SubCUTAneous Q8H  
 hydrALAZINE (APRESOLINE) 20 mg/mL injection 10 mg  10 mg IntraVENous Q6H PRN  
 alcohol 62% (NOZIN) nasal  1 Ampule  1 Ampule Topical Q12H  
 
 
EXAM 
GEN - Alert, oriented, in no distress CV - S1, S2, RRR, no rub, murmur, or gallop Lung - rales and exp wheezes bilaterally Abd - soft, nontender, BS present Ext - 1+ LE edema AVF + thrill+bruit Recent Labs 08/01/18 
 0456  07/31/18 
 2069  07/30/18 
 1102 WBC  4.8  5.4  6.8 HGB  10.8*  10.5*  12.8 HCT  33.9*  33.4*  39.7 PLT  181  190  263 Recent Labs 08/01/18 
 0456  07/31/18 
 5033  07/30/18 
 1102 NA  136  140  137  
K  4.6  4.5  5.9*  
CL  98  100  101 CO2  27  28  24 BUN  22  16  31* CREA  8.30*  5.97*  9.02* CA  7.3*  7.8*  8.2*  
GLU  93  95  155* MG   --   2.0   -- Assessment and Plan:  
ESRD on HD [facility] 
- HD today per routine for clearance and volume - UF as tolerates Dyspnea 
- volume overload  
- improved after HD and thoracentesis 
- on room air Bilateral pleural effusions - s/p left thoracentesis 7/31 - 500 ml 
- s/p right thoracentesis 7/31 - 400 ml 
 
A-fib 
- on BB and CCB 
- cardiology following. Increased cardizem 
- not on [address] - per pt refusal 
 
Anemia of ESRD 
- hemoglobin in goal range for ESRD 
 
HTN 
- controlled 
- continue home meds Nichols Jacobson, [facility]

## 2018-08-01 NOTE — DIALYSIS
TRANSFER OUT -DIALYSIS Hemodialysis treatment completed without complications. Patient alert  and VS stable  /75  P 130   
 
 2.8 Kgs removed. Needles X2 removed from access and manual pressure held until hemostasis complete and pressure dressing applied. Patient to 830 after dialysis.

## 2018-08-01 NOTE — CONSULTS
[facility] Cardiology Consultation        Date of  Admission: 7/30/2018 11:13 AM     Primary Care Physician: none  Primary Cardiologist: Dr. Anjum Haley  Referring Physician: Dr. Joyce Trivedi  Consulting Physician: Dr. Fili Nelson    CC/Reason for consult: AF    HPI:  Yasmine Salinas is a 80 y.o. WF with h/o permanent AF not on [address] per Pt wishes, CAD, ESRD- HD, HTN, dyslipidemia, anemia, Chron's with ileostomy who presented to [facility] ER on 7/30 with c/o increasing SOB, LE swelling with  and was admitted by the hospitalist. CTA was negative for PE but showed bilateral effusions. Pt was given 1 dose of IV lasix and nephrology was consulted for HD. Pt was also seen by pulmonary and thoracentesis was done on 7/31 with 400 cc removed on the right and 500 cc removed on the left. [facility] Cardiology was consulted due to permanent AF and question of increased HR's playing a role in pulmonary edema. Pt had an echo showing EF 55-60%, no WMA, LAE, mild aortic stenosis with SHERINE 1.3 cm2, mild MR and moderate to severe TR. Pt is seen on HD with 's, denies CP, palpitations or syncope. She reports SOB, LE swelling and orthopnea. Pt was seen by Dr. Anjum Haley with elevated HR's and Coreg was increased to 25 mg BID and then on 7/10 she was started on Cardizem  mg Qday by Dr. Anjum Haley. Past Medical History:   Diagnosis Date    Abdominal pain, chronic, right lower quadrant 5/16/2013    Anticoagulated on Coumadin     was told to hold for 5 days- held as 10/15/16    Aortic stenosis 06/26/2015    mild to moderate per echo     Arthritis     minimal    Atrial fibrillation, chronic ([address] 1/14/2016    CAD (coronary artery disease) 06/2013    mild MI     Carotid stenosis without Infarct 1/19/2016    Chronic anemia 6/26/2013    Chronic kidney disease     ESRD stage 4, M, W, F dialysis. Henry Beltran  in  [facility]    Chronic renal failure 1/19/2016    Crohn's disease ([address]     colectomy    Dyslipidemia 6/26/2013    Edema 1/19/2016    ESRD (end stage renal disease) on dialysis ([address] 05/16/2013    right arm functioning- fistula----M[facility]F [facility] Former smoker     GERD (gastroesophageal reflux disease)     controlled on meds takes prilosec    Gout 6/26/2013    History of ileostomy 5/16/2013    History of peritonitis 2013    dialysis associated    History of sepsis 2009    Hypercholesteremia     Hyperlipidemia 1/19/2016    Hypertension      well controlled by medication    Hypokalemia 5/16/2013    Hypomagnesemia 6/23/2009    Ileostomy in place ([address]     Intractable nausea and vomiting 5/16/2013    Leukocytosis 5/16/2013    NSTEMI (non-ST elevated myocardial infarction) ([address] 6/26/2013    Palpitations 1/19/2016    Paroxysmal atrial fibrillation (HCC) 1/19/2016    Paroxysmal tachycardia ([address] 1/19/2016    S/P AAA repair     \" aneurysm in my stomach repaired\"    Serum lipase elevation 5/16/2013    Tricuspid regurgitation EF 55-60%    moderate to severe per echo 6/26/15      Past Surgical History:   Procedure Laterality Date    ABDOMEN SURGERY PROC UNLISTED      insertion and removal of infected PD cath    COLONOSCOPY N/A 10/20/2016    ILEOSCOPY THROUGH OSTOMY/ 25 performed by Iva San [facility] at [facility] HX AAA REPAIR  2013    HX BREAST BIOPSY Bilateral       x3 on left breast    HX CATARACT REMOVAL Bilateral 2005    wit IOL    HX COLECTOMY  1983    colon removed / ileostomy    HX LAP CHOLECYSTECTOMY  2013    VASCULAR SURGERY PROCEDURE UNLIST Left 2009    fistulagram 8/09, LUE    VASCULAR SURGERY PROCEDURE UNLIST      7/09 L subclavian hemodialysis cath    VASCULAR SURGERY PROCEDURE UNLIST Right 2014    fistula       Allergies   Allergen Reactions    Adhesive Other (comments)     Skin tears        Social History     Social History    Marital status:      Spouse name: N/A    Number of children: N/A    Years of education: N/A     Occupational History    Not on file.      Social History Main Topics    Smoking status: Former Smoker     Packs/day: 0.25     Years: 5.00     Quit date: 8/22/1971    Smokeless tobacco: Never Used      Comment: quit years ago    Alcohol use No    Drug use: No    Sexual activity: Not on file     Other Topics Concern    Not on file     Social History Narrative     Family History   Problem Relation Age of Onset    Cancer Mother     Stroke Father     Hypertension Father     Heart Disease Brother     Heart Disease Brother     Stroke Sister     Hypertension Sister         Current Facility-Administered Medications   Medication Dose Route Frequency    aspirin delayed-release tablet 81 mg  81 mg Oral DAILY    carvedilol (COREG) tablet 25 mg  25 mg Oral BID WITH MEALS    dilTIAZem CD (CARDIZEM CD) capsule 120 mg  120 mg Oral DAILY    pantoprazole (PROTONIX) tablet 40 mg  40 mg Oral ACB    sevelamer carbonate (RENVELA) tab 1,600 mg  1,600 mg Oral TID WITH MEALS    zolpidem (AMBIEN) tablet 5 mg  5 mg Oral QHS PRN    sodium chloride (NS) flush 5-10 mL  5-10 mL IntraVENous Q8H    sodium chloride (NS) flush 5-10 mL  5-10 mL IntraVENous PRN    acetaminophen (TYLENOL) tablet 650 mg  650 mg Oral Q4H PRN    heparin (porcine) injection 5,000 Units  5,000 Units SubCUTAneous Q8H    hydrALAZINE (APRESOLINE) 20 mg/mL injection 10 mg  10 mg IntraVENous Q6H PRN    alcohol 62% (NOZIN) nasal  1 Ampule  1 Ampule Topical Q12H       Review of symptoms:  General: +recent weight gain, weakness, +fatigue, fever or chills   Skin: no rashes, lumps, or other skin changes   HEENT: no headache, dizziness, lightheadedness, vision changes, hearing changes, tinnitus, vertigo, sinus pressure/pain, bleeding gums, sore throat, or hoarseness   Neck: no swollen glands, goiter, pain or stiffness   Respiratory: no cough, sputum, hemoptysis, +dyspnea, wheezing   Cardiovascular: no chest pain or discomfort, palpitations, +dyspnea, +orthopnea, paroxysmal nocturnal dyspnea, +peripheral edema Gastrointestinal: no trouble swallowing, heartburn, change of appetite, nausea, change in bowel habits, pain with defecation, rectal bleeding or black/tarry stools, hemorrhoids, constipation, diarrhea, abdominal pain, jaundice, liver or gallbladder problems   Urinary: no frequency, urgency , hematuria, burning/pain with urination, recent flank pain, polyuria, nocturia, or difficulty urinating   Genital: no vaginal or pelvic infections   Peripheral Vascular: no claudication, leg cramps, prior DVTs, swelling of calves, legs, or feet, color change, or swelling with redness or tenderness   Musculoskeletal: no muscle or joint pain/stiffness, joint swelling, erythema of joints, or back pain   Psychiatric: no depression, mental disorders, or excessive stress   Neurological: no history of CVA, dizziness, no sensory or motor loss, seizures, syncope, tremors, numbness, tingling, no changes in mood, attention, or speech, no changes in orientation, memory, insight, or judgment. no headache, vertigo. Hematologic: + anemia, easy bruising or bleeding   Endocrine: no diabetes, thyroid problems, heat or cold intolerance, excessive sweating, polyuria, polydipsia        Subjective:   Physical Exam    Visit Vitals    BP (!) 122/94    Pulse (!) 126    Temp 98 °F (36.7 °C)    Resp 20    Ht 5' 3\" (1.6 m)    Wt 65.1 kg (143 lb 8 oz)    SpO2 92%    BMI 25.42 kg/m2     General Appearance:  Well developed, well nourished,alert and oriented x 3, and individual in no acute distress. Ears/Nose/Mouth/Throat:   Hearing grossly normal.         Neck: Supple. Chest:   Lungs with crackles bilaterally. Cardiovascular:  Rapid irregular rate and rhythm, S1, S2   Abdomen:   Soft, non-tender, bowel sounds are active. Extremities: trace edema bilaterally. Skin: Warm and dry.            Cardiographics  Telemetry: AFIB  ECG: atrial fibrillation    Labs:   Recent Results (from the past 24 hour(s))   CULTURE, BODY FLUID W GRAM STAIN Collection Time: 07/31/18  2:00 PM   Result Value Ref Range    Special Requests: LEFT      GRAM STAIN 0 TO 10 WBC'S/OIF     GRAM STAIN NO DEFINITE ORGANISM SEEN      Culture result: NO GROWTH 1 DAY     CELL COUNT, BODY FLUID    Collection Time: 07/31/18  2:00 PM   Result Value Ref Range    BODY FLUID TYPE L PLEURAL     FLUID COLOR ORANGE      FLUID APPEARANCE CLOUDY      FLUID RBC CT. 73345 /cu mm    FLUID WBC COUNT 596 /cu mm    FLD NEUTROPHILS 10 %    FLD LYMPHS 90 %    FLUID COMMENT MODERATE NUMBER OF MONONUCLEAR FORMS PRESENT    GLUCOSE, FLUID    Collection Time: 07/31/18  2:00 PM   Result Value Ref Range    Fluid Type: L PLEURAL     Glucose, body fld. PENDING MG/DL   LDH, BODY FLUID    Collection Time: 07/31/18  2:00 PM   Result Value Ref Range    Fluid Type: LPLEURAL     LD, body fld. PENDING U/L   PROTEIN TOTAL, FLUID    Collection Time: 07/31/18  2:00 PM   Result Value Ref Range    Fluid Type: LPLEURAL     Protein total, body fld. PENDING g/dL   CBC W/O DIFF    Collection Time: 08/01/18  4:56 AM   Result Value Ref Range    WBC 4.8 4.3 - 11.1 K/uL    RBC 3.07 (L) 4.05 - 5.25 M/uL    HGB 10.8 (L) 11.7 - 15.4 g/dL    HCT 33.9 (L) 35.8 - 46.3 %    .4 (H) 79.6 - 97.8 FL    MCH 35.2 (H) 26.1 - 32.9 PG    MCHC 31.9 31.4 - 35.0 g/dL    RDW 19.2 (H) 11.9 - 14.6 %    PLATELET 786 685 - 936 K/uL    MPV 10.3 (L) 10.8 - 24.6 FL   METABOLIC PANEL, BASIC    Collection Time: 08/01/18  4:56 AM   Result Value Ref Range    Sodium 136 136 - 145 mmol/L    Potassium 4.6 3.5 - 5.1 mmol/L    Chloride 98 98 - 107 mmol/L    CO2 27 21 - 32 mmol/L    Anion gap 11 7 - 16 mmol/L    Glucose 93 65 - 100 mg/dL    BUN 22 8 - 23 MG/DL    Creatinine 8.30 (H) 0.6 - 1.0 MG/DL    GFR est AA 6 (L) >60 ml/min/1.73m2    GFR est non-AA 5 (L) >60 ml/min/1.73m2    Calcium 7.3 (L) 8.3 - 10.4 MG/DL       Pt has been seen and examined by Dr. Baldev Chavez. He agrees with the following assessment and plan.      Assessment/Plan:          Diagnosis    Permanent atrial fibrillation- HR's elevated especially on HD- possibly contributing to pulmonary edema, change Coreg to Metoprolol and increase Cardizem CD, monitor on remote telemetry    Pulmonary edema/volume overload- volume removal with HD    Pleural effusion- right s/p thoracentesis with 400 cc removed 7/31 per pulmonary    Pleural effusion- left s/p thoracentesis with 500 cc removed 7/31 per pulmonary    Hypertension- controlled, monitor    CAD (coronary artery disease)- recent nuclear stress test showed no ischemia and normal EF, no angina, continue ASA, BB, Statin    Aortic stenosis- mild by echo    Dyslipidemia- statin    ESRD (end stage renal disease) on dialysis ([facility]- per nephrology    Crohn's disease [facility]) s/p ileostomy    H/O Carotid stenosis     Chronic anemia- secondary to CKD       Thank you for consulting [facility] Cardiology and allowing us to participate in the care of this patient. We will continue to follow along with you.     Patience Gonzalez PA-C

## 2018-08-01 NOTE — PROGRESS NOTES
Patient returned to room from Dialysis Unit. Alert and oriented times three. Assessment completed. Patient sitting up in bed, ambulated to bathroom with assistance. Demonstrating no signs of dyspnea or distress on room air. Bilateral dressings from yesterdays' thoracentesis removed, with sites clean, dry and intact. Morning medications given. Patients' daughter at the bedside, providing support. Patient voices no concerns at this time. Will continue to monitor.

## 2018-08-01 NOTE — PROGRESS NOTES
Pulmonary Daily NOTE Miesha Concepcionamanda 8/1/2018 Patient is a 80 y.o.  female, PMH ESRD on dialysis, Crohn's disease, NSTEMI, HTN, paroxysmal atrial fibrillation, and chronic anemia, seen and evaluated at the request of Dr. Luis Joe for pleural effusions. The patient  States that around 3 am yesterday morning she woke up short of breath and was unable to lie flat. The patient came to the ED for further evaluation and was noted to have significant pulmonary edema per CXR. CT chest w/ contrast was performed yesterday and significant bilateral pleural effusions were noted. The patient is a [facility] patient; she denies missing any dialysis appointments recently. She is not on any anticoagulation; she states she stopped taking Coumadin (for atrial fib) at least 6 months ago. She is on baby aspirin. She denies any fevers, chills, nausea, vomiting, or diarrhea. She does see Dr. Adilene Ibrahim with [facility]. Patient s/p b/l taps on 7/31 with left 500 and R 400 removed. Date of Admission:  7/30/2018 The patient's chart is reviewed and the patient is discussed with the staff. Subjective:  
 
 s/p b/l taps yesterday. Today no difficulty lying down. No chest pain. No wheezing. Just s/p HD. Daughter with her. HR has been jumping from 100 to 130 during HD. Also lower HR overnight at ?40's Review of Systems: 
-Fever 
-Headaches 
-Chest pain 
-Dyspnea, -wheezing, -cough 
-Abdominal pain, -constipation 
-Leg swelling All other organ systems grossly normal. 
 
 
Current Facility-Administered Medications Medication Dose Route Frequency  aspirin delayed-release tablet 81 mg  81 mg Oral DAILY  carvedilol (COREG) tablet 25 mg  25 mg Oral BID WITH MEALS  dilTIAZem CD (CARDIZEM CD) capsule 120 mg  120 mg Oral DAILY  pantoprazole (PROTONIX) tablet 40 mg  40 mg Oral ACB  sevelamer carbonate (RENVELA) tab 1,600 mg  1,600 mg Oral TID WITH MEALS  zolpidem (AMBIEN) tablet 5 mg  5 mg Oral QHS PRN  
 sodium chloride (NS) flush 5-10 mL  5-10 mL IntraVENous Q8H  
 sodium chloride (NS) flush 5-10 mL  5-10 mL IntraVENous PRN  
 acetaminophen (TYLENOL) tablet 650 mg  650 mg Oral Q4H PRN  
 heparin (porcine) injection 5,000 Units  5,000 Units SubCUTAneous Q8H  
 hydrALAZINE (APRESOLINE) 20 mg/mL injection 10 mg  10 mg IntraVENous Q6H PRN  
 alcohol 62% (NOZIN) nasal  1 Ampule  1 Ampule Topical Q12H Objective:  
 
Vitals:  
 07/31/18 1950 08/01/18 0001 08/01/18 0347 08/01/18 6914 BP: 116/68 115/68 116/68 110/45 Pulse: (!) 46 91 68 93 Resp: 20 20 20 Temp: 98 °F (36.7 °C) 97.9 °F (36.6 °C) 98 °F (36.7 °C) SpO2: 93% 91% 92% Weight:   143 lb 8 oz (65.1 kg) Height: PHYSICAL EXAM  
 
Constitutional:  the patient is well developed and in no acute distress EENMT:  Sclera clear, pupils equal, oral mucosa moist 
Respiratory: Crackles, decreased lower, on room air Cardiovascular:  Irregularly irregular without M,G,R 
Gastrointestinal: soft and non-tender; with positive bowel sounds. Musculoskeletal: warm without cyanosis. There is trace bilateral lower leg edema. Skin:  no jaundice or rashes, no wounds Neurologic: no gross neuro deficits Psychiatric:  alert and oriented x 3, pleasant and following commands 7/31/18 -- imrpoved b/l effusions s/p thoracentesis CXR:   
7/30/2018 IMPRESSION:  PULMONARY EDEMA. CT CHEST W/CONTRAST: 
7/30/2018 IMPRESSION:  1. No pulmonary embolism. 2. Significant bilateral pleural effusions with lobe atelectasis. Recent Labs 08/01/18 
 0456  07/31/18 
 2931  07/30/18 
 1102 WBC  4.8  5.4  6.8 HGB  10.8*  10.5*  12.8 HCT  33.9*  33.4*  39.7 PLT  181  190  263 Recent Labs 08/01/18 
 0965  07/31/18 
 9907  07/30/18 
 2343  07/30/18 
 1102 NA  136  140   --   137  
K  4.6  4.5   --   5.9*  
CL  98  100   --   101 GLU  93  95   -- 155*  
CO2  27  28   --   24 BUN  22  16   --   31* CREA  8.30*  5.97*   --   9.02* MG   --   2.0   --    --   
CA  7.3*  7.8*   --   8.2*  
TROIQ   --   0.10*  0.10*  0.08* ALB   --    --    --   4.1 TBILI   --    --    --   0.7 ALT   --    --    --   20 SGOT   --    --    --   27 No results for input(s): PH, PCO2, PO2, HCO3 in the last 72 hours. No results for input(s): LCAD, LAC in the last 72 hours. Echo yesterday: SUMMARY: 
 
-  Left ventricle: Systolic function was normal. Ejection fraction was 
estimated in the range of 55 % to 60 %. There were no regional wall motion 
abnormalities. There was moderate concentric hypertrophy. -  Left atrium: The atrium was moderately dilated. -  Aortic valve: There was mild stenosis. The aortic valve area by the 
continuity equation was 1.3 cm2. 
 
-  Mitral valve: There was mild regurgitation. -  Tricuspid valve: There was moderate to severe regurgitation. Assessment:  (Medical Decision Making) Hospital Problems  Date Reviewed: 7/31/2018 Codes Class Noted POA * (Principal)Shortness of breath ICD-10-CM: R06.02 
ICD-9-CM: 786.05  7/30/2018 Yes Bilateral pleural effusions per CT chest 
S/p B/C taps on 7/31 with left 500 and R 400 removed. Pulmonary edema ICD-10-CM: J81.1 ICD-9-CM: 996  7/30/2018 Yes Emergency HD done in ED Hypertension, benign ICD-10-CM: I10 
ICD-9-CM: 401.1  1/19/2016 Yes Chronic CAD (coronary artery disease) ICD-10-CM: I25.10 ICD-9-CM: 414.00  1/19/2016 Yes Overview Addendum 1/19/2016  1:33 PM by Nely TERAN - 50% LAD and RCA by cath 2 years ago Chronic Paroxysmal atrial fibrillation (HCC) ICD-10-CM: I48.0 ICD-9-CM: 427.31  1/19/2016 Yes Chronic No anticoaguation per patient. Chronic anemia (Chronic) ICD-10-CM: D64.9 ICD-9-CM: 285.9  6/26/2013 Yes Chronic ESRD (end stage renal disease) [facility] ICD-10-CM: N18.6, Z99.2 ICD-9-CM: 585.6, V45.11  5/16/2013 Yes HD Mon-Wed-Fri History of ileostomy (Chronic) ICD-10-CM: I45.956 ICD-9-CM: V45.89  5/16/2013 Yes Chronic Pleural effusion, bilateral ICD-10-CM: J90 ICD-9-CM: 511.9  7/2/2009 Yes As per above Plan:  (Medical Decision Making) --cxr improved post tap yesterday and dyspnea is less and now able to lie down. --Dialysis per Nephrology 
--Consult to Cardiology for atrial fib, patient no longer on [address] [place] [address] Road. Note was in pulmonary edema this admission and has been very compliant with HD per patient and daughter (yesterday) --continue remaining treatment. --will sign off since on RA and no longer with effusions. If condition changes, please re-consult. More than 50% of the time documented was spent in face-to-face contact with the patient and in the care of the patient on the floor/unit where the patient is located. Justus Lei MD  
 
 
This note was signed electronically.

## 2018-08-01 NOTE — PROGRESS NOTES
Hospitalist Progress Note 2018 Admit Date: 2018 11:13 AM  
NAME: Diana Monteiro :  3/14/1933 DOS:              18 MRN:  [id number] Attending: Cece Combs MD 
PCP:  None Treatment Team: Attending Provider: Yuliana Palm MD; Consulting Provider: Nalani Favre, MD; Utilization Review: Leena Young; Consulting Provider: Elizabeth Menchaca MD; Care Manager: Antonio Rico. Yaneli Casper Full Code SUBJECTIVE: As previously documented: 79 yo F with PMHx of  CAD, pAFib on no OAC because she refused, HTN, HLP, ESRD on HD MWF, Chronic anemia due ro CKD, Chron's disease s/p ileostomy who complains of sudden onset of SOB that started around 3AM associated with nonproductive cough. Denies chest pain or spikes of fevers. Complains of increased b/l LE edema. LAst HD was Friday, uneventful. ED work-up showed  CXR with pulmonary edema Received IV lasix and . Nephrology consulted for emergency HD. S/p HD and improvement of the symptoms. Pulmonary recommended thoracocentesis R pleural effussion. S/p thoracocentesis on 731. Cardiology evaluated pt for afib, medications adjusted. 18    Diana Monteiro stated her breathing continues to improve. Family at bedside all questions answered. 10+ ROS reviewed and negative except for positive in HPI. Allergies Allergen Reactions  Adhesive Other (comments) Skin tears Current Facility-Administered Medications Medication Dose Route Frequency  zolpidem (AMBIEN) tablet 5 mg  5 mg Oral QHS PRN  
 metoprolol tartrate (LOPRESSOR) tablet 50 mg  50 mg Oral BID  [START ON 2018] dilTIAZem CD (CARDIZEM CD) capsule 180 mg  180 mg Oral DAILY  aspirin delayed-release tablet 81 mg  81 mg Oral DAILY  pantoprazole (PROTONIX) tablet 40 mg  40 mg Oral ACB  sevelamer carbonate (RENVELA) tab 1,600 mg  1,600 mg Oral TID WITH MEALS  sodium chloride (NS) flush 5-10 mL  5-10 mL IntraVENous Q8H  
 sodium chloride (NS) flush 5-10 mL  5-10 mL IntraVENous PRN  
 acetaminophen (TYLENOL) tablet 650 mg  650 mg Oral Q4H PRN  
 heparin (porcine) injection 5,000 Units  5,000 Units SubCUTAneous Q8H  
 hydrALAZINE (APRESOLINE) 20 mg/mL injection 10 mg  10 mg IntraVENous Q6H PRN  
 alcohol 62% (NOZIN) nasal  1 Ampule  1 Ampule Topical Q12H There is no immunization history for the selected administration types on file for this patient. Objective:  
 
Patient Vitals for the past 24 hrs: 
 Temp Pulse Resp BP SpO2  
18 1530 97.9 °F (36.6 °C) 90 19 90/50 92 % 18 1151 98.2 °F (36.8 °C) 98 19 144/74 98 % 18 1027 - (!) 130 - 114/75 -  
18 0956 - (!) 131 - (!) 136/107 -  
18 0932 - (!) 126 - (!) 122/94 -  
18 0909 - (!) 121 - 138/77 -  
18 0834 - (!) 113 - 143/74 -  
18 0801 - 96 - 145/72 -  
18 0723 - 93 - 110/45 -  
18 0347 98 °F (36.7 °C) 68 20 116/68 92 % 18 0001 97.9 °F (36.6 °C) 91 20 115/68 91 %  
18 1950 98 °F (36.7 °C) (!) 46 20 116/68 93 % Temp (24hrs), Av °F (36.7 °C), Min:97.9 °F (36.6 °C), Max:98.2 °F (36.8 °C) Oxygen Therapy O2 Sat (%): 92 % (18 1530) Pulse via Oximetry: 82 beats per minute (18 1400) O2 Device: Room air (18 1400) O2 Flow Rate (L/min): 2 l/min (18 1742) Oxygen Therapy O2 Sat (%): 92 % (18 1530) Pulse via Oximetry: 82 beats per minute (18 1400) O2 Device: Room air (18 1400) O2 Flow Rate (L/min): 2 l/min (18 1742) Physical Exam: 
General:         Alert, cooperative, no distress HEENT:               NCAT. No obvious deformity. Lungs:                 Few crackles at lung bases. No wheezing or ronchi. Cardiovascular:   RRR. No m/r/g. No pedal edema b/l. Abdomen:       S/nt/nd. Bowel sounds normal. .  
Skin:         No rashes or lesions. Not Jaundiced Neurologic:   . No gross focal deficit. Psychiatric:         Good mood.  Normal affect. DIAGNOSTIC STUDIES Data Review:  
Recent Results (from the past 24 hour(s)) CBC W/O DIFF Collection Time: 08/01/18  4:56 AM  
Result Value Ref Range WBC 4.8 4.3 - 11.1 K/uL  
 RBC 3.07 (L) 4.05 - 5.25 M/uL  
 HGB 10.8 (L) 11.7 - 15.4 g/dL HCT 33.9 (L) 35.8 - 46.3 % .4 (H) 79.6 - 97.8 FL  
 MCH 35.2 (H) 26.1 - 32.9 PG  
 MCHC 31.9 31.4 - 35.0 g/dL  
 RDW 19.2 (H) 11.9 - 14.6 % PLATELET 734 205 - 400 K/uL MPV 10.3 (L) 10.8 - 33.0 FL  
METABOLIC PANEL, BASIC Collection Time: 08/01/18  4:56 AM  
Result Value Ref Range Sodium 136 136 - 145 mmol/L Potassium 4.6 3.5 - 5.1 mmol/L Chloride 98 98 - 107 mmol/L  
 CO2 27 21 - 32 mmol/L Anion gap 11 7 - 16 mmol/L Glucose 93 65 - 100 mg/dL BUN 22 8 - 23 MG/DL Creatinine 8.30 (H) 0.6 - 1.0 MG/DL  
 GFR est AA 6 (L) >60 ml/min/1.73m2 GFR est non-AA 5 (L) >60 ml/min/1.73m2 Calcium 7.3 (L) 8.3 - 10.4 MG/DL All Micro Results Procedure Component Value Units Date/Time FUNGUS CULTURE AND SMEAR [[id number]] Collected:  07/31/18 1400 Order Status:  Completed Specimen:  Miscellaneous sample Updated:  08/01/18 1237 Source PLEURAL FLUID     
   LEFT Fungus stain Direct Inoculation Fungus (Mycology) Culture Other source received  
   (NOTE) Performed At: [address] [id number] Nikole Armendariz MD QR:[id number] CULTURE, BODY FLUID Mary Alanis [[id number]] Collected:  07/31/18 1400 Order Status:  Completed Specimen:  Pleural Fluid Updated:  08/01/18 4188 Special Requests: LEFT     
  GRAM STAIN 0 TO 10 WBC'S/OIF  
   NO DEFINITE ORGANISM SEEN Culture result: NO GROWTH 1 DAY     
 AFB CULTURE + SMEAR W/RFLX ID FROM CULTURE [[id number]] Collected:  07/31/18 1400 Order Status:  Completed Updated:  07/31/18 1512 Imaging Julio Cesar García /Studies: CXR Results  (Last 48 hours)  07/31/18 1440  XR CHEST PORT Final result Impression:  IMPRESSION: As above Narrative:  Portable chest x-ray 7/31/2018 INDICATION: Bilateral thoracentesis Comparison 7/30/2018 The bilateral pleural effusions have been drained. There is no pneumothorax. Cardiomegaly and pulmonary vascular congestion is again noted. CT Results  (Last 48 hours) 07/30/18 2119  CT CHEST W CONT Final result Impression:  IMPRESSION:  
   
1. No pulmonary embolism. 2. Significant bilateral pleural effusions with lobe atelectasis. Narrative:  CT CHEST WITH CONTRAST 7/30/2018 HISTORY: Sudden onset of shortness of breath beginning at 3:00 AM with  
nonproductive cough. TECHNIQUE: The patient received 100 mL Isovue-370 nonionic IV contrast and axial  
images were obtained through the chest. Coronal reformatted images were  
generated. All CT scans at this facility used dose modulation, interactive  
reconstruction and/or weight based dosing when appropriate to reduce radiation  
dose to as low as reasonably achievable. COMPARISON: Portable chest x-ray from the same day FINDINGS: There are no filling defects within the pulmonary arteries suggestive  
of acute emboli. Atherosclerotic coronary artery calcifications are present. The  
thoracic aorta is well-opacified without aneurysm or dissection. There is no thoracic adenopathy. Bilateral pleural effusions are present with lower lobe atelectasis. There is heterogeneous attenuation of the pulmonary parenchyma. This finding may  
be present with air trapping from small airways disease. Included portions of the upper abdomen demonstrate normal-appearing adrenal  
glands. There is a small hiatal hernia. There are no aggressive osseous lesions. No results found.  
Results for orders placed or performed during the hospital encounter of 07/30/18  
2D ECHO COMPLETE ADULT (TTE) W OR WO CONTR  
 Narrative [address] [place], [address] [facility] 
[phone] Transthoracic Echocardiogram 
2D, M-mode, Doppler, and Color Doppler Patient: Radha Aguila 
MR #: [id number] : 14-Mar-1933 Age: 80 years Gender: Female Study date: 2018 Account #: [de-identified] Height: 63 in 
Weight: 143.7 lb 
BSA: 1.68 mï¾² Status:Routine Location: 830 BP: 95/ 51 Allergies: ADHESIVE Sonographer:  Kostas Haque RDCS Group:  [facility] Referring Physician: Nette Ball NP Reading Physician:  Josette Fuentes MD [facility] INDICATIONS: Atrial Fibrillation PROCEDURE: This was a routine study. A transthoracic echocardiogram was 
performed. The study included complete 2D imaging, M-mode, complete spectral 
Doppler, and color Doppler. Image quality was adequate. LEFT VENTRICLE: Size was normal. Systolic function was normal. Ejection 
fraction was estimated in the range of 55 % to 60 %. There were no regional 
wall motion abnormalities. There was moderate concentric hypertrophy. Left 
ventricular diastolic function is present. Average E/e'~27. RIGHT VENTRICLE: The size was normal. Systolic function was normal. Estimated 
peak pressure was in the range of 40-45 mmHg. LEFT ATRIUM: The atrium was moderately dilated. RIGHT ATRIUM: Size was normal. 
 
SYSTEMIC VEINS: IVC: The inferior vena cava was normal in size and course. AORTIC VALVE: The valve was trileaflet. Leaflets exhibited mild  
calcification. The aortic valve area by the continuity equation was 1.3 cm2. The peak  
velocity 
was 2.45 m/s. The mean pressure gradient was 15 mmHg. The peak pressure 
gradient was 24 mmHg. There was mild stenosis. MITRAL VALVE: There was mild calcification. There was no evidence for  
stenosis. There was mild regurgitation. TRICUSPID VALVE: The valve structure was normal. There was no evidence for 
stenosis.  There was moderate to severe regurgitation. PULMONIC VALVE: Not well visualized. There was no evidence for stenosis. There 
was no insufficiency. PERICARDIUM: There was no pericardial effusion. AORTA: The root exhibited normal size. SUMMARY: 
 
-  Left ventricle: Systolic function was normal. Ejection fraction was 
estimated in the range of 55 % to 60 %. There were no regional wall motion 
abnormalities. There was moderate concentric hypertrophy. -  Left atrium: The atrium was moderately dilated. -  Aortic valve: There was mild stenosis. The aortic valve area by the 
continuity equation was 1.3 cm2. 
 
-  Mitral valve: There was mild regurgitation. 
 
-  Tricuspid valve: There was moderate to severe regurgitation. SYSTEM MEASUREMENT TABLES 
 
2D mode AoR Diam (2D): 2.4 cm 
LA Dimension (2D): 4 cm Left Atrium Systolic Volume Index; Method of Disks, Biplane; 2D mode;: 38.6 
ml/m2 IVS/LVPW (2D): 1.1 IVSd (2D): 1.5 cm LVIDd (2D): 3.8 cm LVIDs (2D): 2.8 cm 
LVOT Area (2D): 3.1 cm2 LVPWd (2D): 1.5 cm Tissue Doppler Imaging LV Peak Early Gonzalez Tissue Andreas; Mean; Lateral MA (TDI): 6.3 cm/s LV Peak Early Gonzalez Tissue Andreas; Medial MA (TDI): 5.1 cm/s Unspecified Scan Mode Peak Grad; Mean; Antegrade Flow: 20 mm[Hg] Vmax; Antegrade Flow: 245 cm/s LVOT Diam: 2 cm 
MV Peak Andreas/LV Peak Tissue Andreas E-Wave; Lateral MA: 24.6 MV Peak Andreas/LV Peak Tissue Andreas E-Wave; Medial MA: 30 Peak Grad; Mean; Antegrade Flow: 10 mm[Hg] Vmax; Antegrade Flow: 159 cm/s Prepared and signed by 
 
uSn Hatfield MD [facility] Signed 31-Jul-2018 14:17:20 Labs and Studies from previous 24 hours have been personally reviewed by myself   
ASSESSMENT Active Hospital Problems Diagnosis Date Noted  Pleural effusion, right 07/31/2018  Shortness of breath 07/30/2018  Pulmonary edema 07/30/2018  Hypertension, benign 01/19/2016  CAD (coronary artery disease) 01/19/2016 ASCAD - 50% LAD and RCA by cath 2 years ago  Paroxysmal atrial fibrillation ([facility] 01/19/2016  Chronic anemia 06/26/2013  History of ileostomy 05/16/2013  ESRD (end stage renal disease) on dialysis ([facility] 05/16/2013  Pleural effusion, left 07/02/2009 Hospital Problems as of 8/1/2018  Date Reviewed: 7/31/2018 Codes Class Noted - Resolved POA Pleural effusion, right ICD-10-CM: J90 ICD-9-CM: 511.9  7/31/2018 - Present Unknown * (Principal)Shortness of breath ICD-10-CM: R06.02 
ICD-9-CM: 786.05  7/30/2018 - Present Yes Pulmonary edema ICD-10-CM: J81.1 ICD-9-CM: 407  7/30/2018 - Present Yes Hypertension, benign ICD-10-CM: I10 
ICD-9-CM: 401.1  1/19/2016 - Present Yes CAD (coronary artery disease) ICD-10-CM: I25.10 ICD-9-CM: 414.00  1/19/2016 - Present Yes Overview Addendum 1/19/2016  1:33 PM by Zando Mater ASCAD - 50% LAD and RCA by cath 2 years ago Paroxysmal atrial fibrillation (HCC) ICD-10-CM: I48.0 ICD-9-CM: 427.31  1/19/2016 - Present Yes Atrial fibrillation, chronic (HCC) ICD-10-CM: I48.2 ICD-9-CM: 427.31  1/14/2016 - Present Chronic anemia (Chronic) ICD-10-CM: D64.9 ICD-9-CM: 285.9  6/26/2013 - Present Yes ESRD (end stage renal disease) on dialysis ([facility] ICD-10-CM: N18.6, Z99.2 ICD-9-CM: 585.6, V45.11  5/16/2013 - Present Yes History of ileostomy (Chronic) ICD-10-CM: A58.925 ICD-9-CM: V45.89  5/16/2013 - Present Yes Pleural effusion, left ICD-10-CM: J90 ICD-9-CM: 511.9  7/2/2009 - Present Yes A/P: 
-pulmonary edema 
-pleural effusions Specialties evaluation appreciated S/p thoracocentesis. Cx NG so far Cont HD as per nephrology 
 
 
-History of CAD Cont ASA 
  
-HTN Controlled Cont BB and cardizem 
 
-aFIB Rate controlled Refused to be on [address] Cardio eval appreciated Cont bb and Cardizem. Medications adjusted today 
 
-Chronic anemia Stable likely AOCD DVT Prophylaxis: heparin CODE Status: Full Plan of Care Discussed with: patient. Care team. 
 
 
Julio Cesar Beth MD 
08/01/18

## 2018-08-01 NOTE — PROGRESS NOTES
Problem: Interdisciplinary Rounds Goal: Interdisciplinary Rounds Outcome: Progressing Towards Goal 
Interdisciplinary team rounds were held 8/1/2018 with the following team members:Care Management, Nursing, Physical Therapy, Physician and Clinical Coordinator and the patient. Plan of care discussed. See clinical pathway and/or care plan for interventions and desired outcomes.

## 2018-08-01 NOTE — PROGRESS NOTES
Pt resting quietly in bed. Resp even, unlabored. Pt appears in no acute distress at this time. Will continue to monitor.

## 2018-08-01 NOTE — PROGRESS NOTES
Care Management Interventions PCP Verified by CM: Yes Physical Therapy Consult: Yes Occupational Therapy Consult: No 
Current Support Network: Lives Alone Confirm Follow Up Transport: Family Plan discussed with Pt/Family/Caregiver: Yes Freedom of Choice Offered: Yes Discharge Location Discharge Placement: Home Patient lives alone. Independent with ambulation and ADLs. Daughter drives patient to HD appts MWF at [facility]. CM did not identify any discharge needs but remains available.

## 2018-08-01 NOTE — PROGRESS NOTES
[facility]. Assessment complete. Pt is lying in bed. Resp even, unlabored. Pt is alert, orient X 3. Pt appears in no acute distress at this time. Pt is on RA. Pt encouraged to call for assistance, call light in reach. Safety measures in place. Will continue to monitor

## 2018-08-01 NOTE — PROGRESS NOTES
Pt is sitting up in chair. Resp even, unlabored. Pt appears in no acute distress at this time. SBAR end of shift report given to oncoming RN.

## 2018-08-02 VITALS
HEART RATE: 93 BPM | BODY MASS INDEX: 24.91 KG/M2 | SYSTOLIC BLOOD PRESSURE: 117 MMHG | OXYGEN SATURATION: 98 % | HEIGHT: 63 IN | DIASTOLIC BLOOD PRESSURE: 74 MMHG | WEIGHT: 140.6 LBS | RESPIRATION RATE: 20 BRPM | TEMPERATURE: 98 F

## 2018-08-02 LAB
ANION GAP SERPL CALC-SCNC: 14 MMOL/L (ref 7–16)
BACTERIA SPEC CULT: NORMAL
BUN SERPL-MCNC: 13 MG/DL (ref 8–23)
CALCIUM SERPL-MCNC: 7.7 MG/DL (ref 8.3–10.4)
CHLORIDE SERPL-SCNC: 100 MMOL/L (ref 98–107)
CO2 SERPL-SCNC: 25 MMOL/L (ref 21–32)
CREAT SERPL-MCNC: 5.97 MG/DL (ref 0.6–1)
GLUCOSE SERPL-MCNC: 106 MG/DL (ref 65–100)
GRAM STN SPEC: NORMAL
GRAM STN SPEC: NORMAL
POTASSIUM SERPL-SCNC: 3.8 MMOL/L (ref 3.5–5.1)
SERVICE CMNT-IMP: NORMAL
SODIUM SERPL-SCNC: 139 MMOL/L (ref 136–145)

## 2018-08-02 PROCEDURE — 74011250637 HC RX REV CODE- 250/637: Performed by: HOSPITALIST

## 2018-08-02 PROCEDURE — 74011250637 HC RX REV CODE- 250/637: Performed by: INTERNAL MEDICINE

## 2018-08-02 PROCEDURE — 74011250636 HC RX REV CODE- 250/636: Performed by: HOSPITALIST

## 2018-08-02 PROCEDURE — 97530 THERAPEUTIC ACTIVITIES: CPT

## 2018-08-02 PROCEDURE — 80048 BASIC METABOLIC PNL TOTAL CA: CPT

## 2018-08-02 PROCEDURE — 36415 COLL VENOUS BLD VENIPUNCTURE: CPT

## 2018-08-02 RX ORDER — METOPROLOL TARTRATE 50 MG/1
50 TABLET ORAL 2 TIMES DAILY
Qty: 60 TAB | Refills: 1 | Status: ON HOLD | OUTPATIENT
Start: 2018-08-02 | End: 2018-09-08

## 2018-08-02 RX ORDER — DILTIAZEM HYDROCHLORIDE 180 MG/1
180 CAPSULE, COATED, EXTENDED RELEASE ORAL DAILY
Qty: 30 CAP | Refills: 1 | Status: SHIPPED | OUTPATIENT
Start: 2018-08-03 | End: 2018-09-10

## 2018-08-02 RX ADMIN — SEVELAMER CARBONATE 1600 MG: 800 TABLET, FILM COATED ORAL at 12:44

## 2018-08-02 RX ADMIN — Medication 10 ML: at 05:28

## 2018-08-02 RX ADMIN — PANTOPRAZOLE SODIUM 40 MG: 40 TABLET, DELAYED RELEASE ORAL at 05:28

## 2018-08-02 RX ADMIN — Medication 1 AMPULE: at 08:55

## 2018-08-02 RX ADMIN — ASPIRIN 81 MG: 81 TABLET, COATED ORAL at 08:50

## 2018-08-02 RX ADMIN — DILTIAZEM HYDROCHLORIDE 180 MG: 180 CAPSULE, COATED, EXTENDED RELEASE ORAL at 08:50

## 2018-08-02 RX ADMIN — SEVELAMER CARBONATE 1600 MG: 800 TABLET, FILM COATED ORAL at 08:50

## 2018-08-02 RX ADMIN — METOPROLOL TARTRATE 50 MG: 50 TABLET ORAL at 08:50

## 2018-08-02 RX ADMIN — HEPARIN SODIUM 5000 UNITS: 5000 INJECTION INTRAVENOUS; SUBCUTANEOUS at 08:51

## 2018-08-02 NOTE — PROGRESS NOTES
Oxygen Qualifier Room air: SpO2 with O2 and liter flow Resting SpO2  96% Ambulating SpO2  95%  no Supp O2 required Completed by: 
 
Kati Klein

## 2018-08-02 NOTE — DISCHARGE SUMMARY
Hospitalist Discharge Summary     Admit Date:  2018 11:13 AM   Name:  Nilo Limon   Age:  80 y.o.  :  3/14/1933   MRN:  [id number]   PCP:  None  Treatment Team: Attending Provider: Shane Chand MD; Consulting Provider: Rosa Ridley MD; Utilization Review: Hanna Garcia; Consulting Provider: Dorinda Garcia MD; Care Manager: Alka Clay.  Adeline Kraus    Problem List for this Hospitalization:  Hospital Problems as of 2018  Date Reviewed: 2018          Codes Class Noted - Resolved POA    Pleural effusion, right ICD-10-CM: J90  ICD-9-CM: 511.9  2018 - Present Unknown        * (Principal)Shortness of breath ICD-10-CM: R06.02  ICD-9-CM: 786.05  2018 - Present Yes        Pulmonary edema ICD-10-CM: J81.1  ICD-9-CM: 974  2018 - Present Yes        Hypertension, benign ICD-10-CM: I10  ICD-9-CM: 401.1  2016 - Present Yes        CAD (coronary artery disease) ICD-10-CM: I25.10  ICD-9-CM: 414.00  2016 - Present Yes    Overview Addendum 2016  1:33 PM by Crystal May - 50% LAD and RCA by cath 2 years ago               Paroxysmal atrial fibrillation ([facility] [address] ICD-10-CM: I48.0  ICD-9-CM: 427.31  2016 - Present Yes        Atrial fibrillation, chronic (HCC) ICD-10-CM: I48.2  ICD-9-CM: 427.31  2016 - Present         Chronic anemia (Chronic) ICD-10-CM: D64.9  ICD-9-CM: 285.9  2013 - Present Yes        ESRD (end stage renal disease) on dialysis [facility]) ICD-10-CM: N18.6, Z99.2  ICD-9-CM: 585.6, V45.11  2013 - Present Yes        History of ileostomy (Chronic) ICD-10-CM: Z98.890  ICD-9-CM: V45.89  2013 - Present Yes        Pleural effusion, left ICD-10-CM: J90  ICD-9-CM: 511.9  2009 - Present Yes                Admission HPI from 2018:    \" For more details please refer to HPI \"    Hospital Course:    79 yo F with PMHx of  CAD, pAFib on no [address] because she refused, HTN, HLP, ESRD on HD MWF, Chronic anemia due ro CKD, Chron's disease s/p ileostomy who complains of sudden onset of SOB that started around 3 AM associated with nonproductive cough. Denies chest pain or spikes of fevers. Complains of increased b/l LE edema. LAst HD was Friday, uneventful. ED work-up showed  CXR with acute pulmonary edema Received IV lasix and  Nephrology was consulted for emergency HD. S/p HD and improvement of the symptoms. Pulmonary recommended thoracocentesis R pleural effussion. S/p thoracocentesis on 07/31 and culture was negative. Cardiology evaluated pt for afib, medications adjusted with better control of heart rate. Patient symptoms improved, patient was 98 % O2 at RA upon discharged. Patient was advised to to follow up appointments as scheduled. Patient symptoms likely from fluid overload due to ?uncontrolled afib. Follow up instructions below. Plan was discussed with patient/ family/ careteam.  All questions answered. Patient was stable at time of discharge and was instructed to call or return if there are any concerns or recurrence of symptoms. Diagnostic Imaging/Tests:        All Micro Results     Procedure Component Value Units Date/Time    FUNGUS CULTURE AND SMEAR [[id number]] Collected:  07/31/18 1400    Order Status:  Completed Specimen:  Miscellaneous sample Updated:  08/02/18 1138     Source PLEURAL FLUID         LEFT        Fungus stain Direct Inoculation     Fungus (Mycology) Culture Other source received      (NOTE)  Performed At: [address] [id number]  Too Gupta MD KW:[id number]         CULTURE, BODY FLUID Mickydina Silvahuey [[id number]] Collected:  07/31/18 1400    Order Status:  Completed Specimen:  Pleural Fluid Updated:  08/02/18 0807     Special Requests: LEFT        GRAM STAIN 0 TO 10 WBC'S/OIF      NO DEFINITE ORGANISM SEEN        Culture result: NO GROWTH 2 DAYS       AFB CULTURE + SMEAR W/RFLX ID FROM CULTURE [[id number]] Collected:  07/31/18 1400    Order Status:  Completed Specimen:  Miscellaneous sample Updated:  08/01/18 1736     Source PLEURAL FLUID         LEFT        AFB Specimen processing Concentration     Acid Fast Smear NEGATIVE          (NOTE)  Performed At: [address] [id number]  Lida Flanagan MD LY:[id number]          Acid Fast Culture PENDING          Labs: Results:       BMP, Mg, Phos Recent Labs      08/02/18   0540  08/01/18   0456  07/31/18   0339   NA  139  136  140   K  3.8  4.6  4.5   CL  100  98  100   CO2  25  27  28   AGAP  14  11  12   BUN  13  22  16   CREA  5.97*  8.30*  5.97*   CA  7.7*  7.3*  7.8*   GLU  106*  93  95   MG   --    --   2.0      CBC Recent Labs      08/01/18 0456 07/31/18   0339   WBC  4.8  5.4   RBC  3.07*  3.00*   HGB  10.8*  10.5*   HCT  33.9*  33.4*   PLT  181  190      LFT No results for input(s): SGOT, ALT, TBIL, AP, TP, ALB, GLOB, AGRAT, GPT in the last 72 hours.    Cardiac Testing Lab Results   Component Value Date/Time     07/30/2018 11:02 AM     07/08/2013 01:33 PM    Troponin-I, Qt. 0.10 (HH) 07/31/2018 03:39 AM    Troponin-I, Qt. 0.10 (HH) 07/30/2018 11:43 PM    Troponin-I, Qt. 0.08 (H) 07/30/2018 11:02 AM      Coagulation Tests Lab Results   Component Value Date/Time    Prothrombin time 10.8 07/08/2013 01:33 PM    Prothrombin time 11.9 (H) 05/15/2013 04:15 PM    Prothrombin time 10.5 09/15/2009 12:28 PM    INR 1.0 07/08/2013 01:33 PM    INR 1.1 05/15/2013 04:15 PM    INR 1.0 09/15/2009 12:28 PM    INR, External 0 03/29/2016    INR POC 4.7 08/17/2017 09:10 AM    INR POC 2.0 08/03/2017 09:12 AM    INR POC 1.3 07/27/2017 08:35 AM    aPTT 23.2 (L) 06/27/2013 03:10 PM    aPTT 39.9 (H) 06/27/2013 07:59 AM    aPTT 33.4 (H) 06/27/2013 01:00 AM      A1c No results found for: HBA1C, HGBE8, TLD7BRMJ, HZM1RSOV   Lipid Panel Lab Results   Component Value Date/Time    Cholesterol, total 112 07/31/2018 03:39 AM    HDL Cholesterol 29 (L) 07/31/2018 03:39 AM    LDL, calculated 46.4 07/31/2018 03:39 AM    VLDL, calculated 36.6 (H) 07/31/2018 03:39 AM    Triglyceride 183 (H) 07/31/2018 03:39 AM    CHOL/HDL Ratio 3.9 07/31/2018 03:39 AM      Thyroid Panel Lab Results   Component Value Date/Time    TSH 1.900 07/08/2013 01:33 PM    TSH 2.550 05/15/2013 04:15 PM        Most Recent UA Lab Results   Component Value Date/Time    Color YELLOW 06/20/2009 01:15 PM    Appearance CLEAR 06/20/2009 01:15 PM    pH (UA) 6.0 06/20/2009 01:15 PM    Protein TRACE (A) 06/20/2009 01:15 PM    Glucose NEGATIVE  06/20/2009 01:15 PM    Ketone NEGATIVE  06/20/2009 01:15 PM    Bilirubin NEGATIVE  06/20/2009 01:15 PM    Blood NEGATIVE  06/20/2009 01:15 PM    Urobilinogen 0.2 06/20/2009 01:15 PM    Nitrites NEGATIVE  06/20/2009 01:15 PM    Leukocyte Esterase NEGATIVE  06/20/2009 01:15 PM        Allergies   Allergen Reactions    Adhesive Other (comments)     Skin tears       There is no immunization history for the selected administration types on file for this patient.     All Labs from Last 24 Hrs:  Recent Results (from the past 24 hour(s))   METABOLIC PANEL, BASIC    Collection Time: 08/02/18  5:40 AM   Result Value Ref Range    Sodium 139 136 - 145 mmol/L    Potassium 3.8 3.5 - 5.1 mmol/L    Chloride 100 98 - 107 mmol/L    CO2 25 21 - 32 mmol/L    Anion gap 14 7 - 16 mmol/L    Glucose 106 (H) 65 - 100 mg/dL    BUN 13 8 - 23 MG/DL    Creatinine 5.97 (H) 0.6 - 1.0 MG/DL    GFR est AA 9 (L) >60 ml/min/1.73m2    GFR est non-AA 7 (L) >60 ml/min/1.73m2    Calcium 7.7 (L) 8.3 - 10.4 MG/DL       Discharge Exam:  Patient Vitals for the past 24 hrs:   Temp Pulse Resp BP SpO2   08/02/18 1057 98 °F (36.7 °C) 93 20 117/74 98 %   08/02/18 0737 98.6 °F (37 °C) 61 20 135/80 92 %   08/02/18 0326 98 °F (36.7 °C) 82 20 126/59 94 %   08/01/18 2356 98 °F (36.7 °C) 91 20 132/55 92 %   08/01/18 2007 98.3 °F (36.8 °C) 71 20 91/55 95 %   08/01/18 1530 97.9 °F (36.6 °C) 90 19 90/50 92 %     Oxygen Therapy  O2 Sat (%): 98 % (08/02/18 1057)  Pulse via Oximetry: 82 beats per minute (07/31/18 1400)  O2 Device: Room air (07/31/18 1400)  O2 Flow Rate (L/min): 2 l/min (07/30/18 1742)    Intake/Output Summary (Last 24 hours) at 08/02/18 1351  Last data filed at 08/02/18 1257   Gross per 24 hour   Intake              840 ml   Output                0 ml   Net              840 ml       Patient stated is ambulating with PT with no SOB. Daughter at bedside all questions answered. General:    Well nourished. Alert. No distress. Eyes:   Normal sclera. Extraocular movements intact. ENT:  Normocephalic, atraumatic. Moist mucous membranes  CV:   Regular rate and rhythm. No murmur, rub, or gallop. Lungs:  Minimal crackles at lung bases. No wheezing, rhonchi. Good air movement. Abdomen: Soft, nontender, nondistended. Bowel sounds normal.   Extremities: Warm and dry. No cyanosis or edema. Neurologic: CN II-XII grossly intact. Sensation intact. Skin:     No rashes or jaundice. No wounds. Discharge Info:   Current Discharge Medication List      START taking these medications    Details   metoprolol tartrate (LOPRESSOR) 50 mg tablet Take 1 Tab by mouth two (2) times a day. Qty: 60 Tab, Refills: 1         CONTINUE these medications which have CHANGED    Details   dilTIAZem CD (CARDIZEM CD) 180 mg ER capsule Take 1 Cap by mouth daily. Qty: 30 Cap, Refills: 1         CONTINUE these medications which have NOT CHANGED    Details   esomeprazole (NEXIUM) 20 mg capsule Take  by mouth daily. aspirin delayed-release 81 mg tablet Take  by mouth daily. sevelamer carbonate (RENVELA) 800 mg tab tab Take 1,600 mg by mouth three (3) times daily (with meals). Indications: Renal Osteodystrophy with Hyperphosphatemia      zolpidem (AMBIEN) 10 mg tablet Take 5 mg by mouth nightly.  Indications: SLEEP-ONSET INSOMNIA         STOP taking these medications       diphenhydrAMINE (BENADRYL) 50 mg tablet Comments:   Reason for Stopping:         lidocaine-prilocaine (EMLA) topical cream Comments: Reason for Stopping:         carvedilol (COREG) 25 mg tablet Comments:   Reason for Stopping:                 Disposition: home  Activity: PT/OT per Home Health  Diet: Renal Diet    Follow-up Information     Follow up With Details Comments Contact Info    None   None ([phone]) Patient stated that they have no PCP          Addendum for code query. Acute pulmonary edema    Signed:   Melita Gilliland MD

## 2018-08-02 NOTE — PROGRESS NOTES
[facility] PROGRESS NOTE Follow up for: 
 
Subjective:  
Patient seen and examined. Chart, notes, labs, imaging, results all reviewed. No new complaints. Feeling better. On room air. No sob, cp, n/v.  
 
ROS: 
Gen - no fever, no chills, appetite okay CV - no chest pain, no orthopnea Lung - no shortness of breath, no cough Abd - no tenderness, no nausea, no vomiting Ext - + edema Objective:  
Exam: 
Vitals:  
 08/01/18 2007 08/01/18 2356 08/02/18 0645 08/02/18 9684 BP: 91/55 132/55 126/59 135/80 Pulse: 71 91 82 61 Resp: 20 20 20 20 Temp: 98.3 °F (36.8 °C) 98 °F (36.7 °C) 98 °F (36.7 °C) 98.6 °F (37 °C) SpO2: 95% 92% 94% 92% Weight:   63.8 kg (140 lb 9.6 oz) Height:      
 
 
 
Intake/Output Summary (Last 24 hours) at 08/02/18 6302 Last data filed at 08/02/18 3036 Gross per 24 hour Intake              960 ml Output             2800 ml Net            -1840 ml  
 
 
Current Facility-Administered Medications Medication Dose Route Frequency  zolpidem (AMBIEN) tablet 5 mg  5 mg Oral QHS PRN  
 metoprolol tartrate (LOPRESSOR) tablet 50 mg  50 mg Oral BID  dilTIAZem CD (CARDIZEM CD) capsule 180 mg  180 mg Oral DAILY  aspirin delayed-release tablet 81 mg  81 mg Oral DAILY  pantoprazole (PROTONIX) tablet 40 mg  40 mg Oral ACB  sevelamer carbonate (RENVELA) tab 1,600 mg  1,600 mg Oral TID WITH MEALS  sodium chloride (NS) flush 5-10 mL  5-10 mL IntraVENous Q8H  
 sodium chloride (NS) flush 5-10 mL  5-10 mL IntraVENous PRN  
 acetaminophen (TYLENOL) tablet 650 mg  650 mg Oral Q4H PRN  
 heparin (porcine) injection 5,000 Units  5,000 Units SubCUTAneous Q8H  
 hydrALAZINE (APRESOLINE) 20 mg/mL injection 10 mg  10 mg IntraVENous Q6H PRN  
 alcohol 62% (NOZIN) nasal  1 Ampule  1 Ampule Topical Q12H  
 
 
EXAM 
GEN - Alert, oriented, in no distress CV - S1, S2, RRR, no rub, murmur, or gallop Lung - rales and exp wheezes bilaterally Abd - soft, nontender, BS present Ext - 1+ LE edema AVF + thrill+bruit Recent Labs 08/01/18 
 0456  07/31/18 
 8648  07/30/18 
 1102 WBC  4.8  5.4  6.8 HGB  10.8*  10.5*  12.8 HCT  33.9*  33.4*  39.7 PLT  181  190  263 Recent Labs 08/02/18 
 1837  08/01/18 
 1708  07/31/18 
 7774 NA  139  136  140  
K  3.8  4.6  4.5  
CL  100  98  100 CO2  25  27  28 BUN  13  22  16 CREA  5.97*  8.30*  5.97* CA  7.7*  7.3*  7.8*  
GLU  106*  93  95 MG   --    --   2.0 Assessment and Plan:  
ESRD on HD [facility] 
- HD tomorrow per routine for clearance and volume - UF as tolerates Dyspnea 
- volume overload  
- improved after HD and thoracentesis 
- on room air Bilateral pleural effusions - s/p left thoracentesis 7/31 - 500 ml 
- s/p right thoracentesis 7/31 - 400 ml 
 
A-fib 
- on BB and CCB 
- cardiology following. Increased cardizem - controlled 
- not on [address] - per pt refusal 
 
Anemia of ESRD 
- hemoglobin in goal range for ESRD 
 
HTN 
- controlled 
- continue home meds ELIER Sauceda

## 2018-08-02 NOTE — DISCHARGE INSTRUCTIONS
DISCHARGE SUMMARY from Nurse    PATIENT INSTRUCTIONS:    After general anesthesia or intravenous sedation, for 24 hours or while taking prescription Narcotics:  · Limit your activities  · Do not drive and operate hazardous machinery  · Do not make important personal or business decisions  · Do  not drink alcoholic beverages  · If you have not urinated within 8 hours after discharge, please contact your surgeon on call. Report the following to your surgeon:  · Excessive pain, swelling, redness or odor of or around the surgical area  · Temperature over 100.5  · Nausea and vomiting lasting longer than 4 hours or if unable to take medications  · Any signs of decreased circulation or nerve impairment to extremity: change in color, persistent  numbness, tingling, coldness or increase pain  · Any questions    What to do at Home:  Recommended activity: Activity as tolerated,     If you experience any of the following symptoms fever greater then 100.5, pain unrelieved by medication, increase in shortness of breath, please follow up with primary care doctor. *  Please give a list of your current medications to your Primary Care Provider. *  Please update this list whenever your medications are discontinued, doses are      changed, or new medications (including over-the-counter products) are added. *  Please carry medication information at all times in case of emergency situations. These are general instructions for a healthy lifestyle:    No smoking/ No tobacco products/ Avoid exposure to second hand smoke  Surgeon General's Warning:  Quitting smoking now greatly reduces serious risk to your health.     Obesity, smoking, and sedentary lifestyle greatly increases your risk for illness    A healthy diet, regular physical exercise & weight monitoring are important for maintaining a healthy lifestyle    You may be retaining fluid if you have a history of heart failure or if you experience any of the following symptoms:  Weight gain of 3 pounds or more overnight or 5 pounds in a week, increased swelling in our hands or feet or shortness of breath while lying flat in bed. Please call your doctor as soon as you notice any of these symptoms; do not wait until your next office visit. Recognize signs and symptoms of STROKE:    F-face looks uneven    A-arms unable to move or move unevenly    S-speech slurred or non-existent    T-time-call 911 as soon as signs and symptoms begin-DO NOT go       Back to bed or wait to see if you get better-TIME IS BRAIN. Warning Signs of HEART ATTACK     Call 911 if you have these symptoms:   Chest discomfort. Most heart attacks involve discomfort in the center of the chest that lasts more than a few minutes, or that goes away and comes back. It can feel like uncomfortable pressure, squeezing, fullness, or pain.  Discomfort in other areas of the upper body. Symptoms can include pain or discomfort in one or both arms, the back, neck, jaw, or stomach.  Shortness of breath with or without chest discomfort.  Other signs may include breaking out in a cold sweat, nausea, or lightheadedness. Don't wait more than five minutes to call 911 - MINUTES MATTER! Fast action can save your life. Calling 911 is almost always the fastest way to get lifesaving treatment. Emergency Medical Services staff can begin treatment when they arrive -- up to an hour sooner than if someone gets to the hospital by car. The discharge information has been reviewed with the patient. The patient verbalized understanding. Discharge medications reviewed with the patient and appropriate educational materials and side effects teaching were provided. ___________________________________________________________________________________________________________________________________     Shortness of Breath: Care Instructions  Your Care Instructions  Shortness of breath has many causes.  Sometimes conditions such as anxiety can lead to shortness of breath. Some people get mild shortness of breath when they exercise. Trouble breathing also can be a symptom of a serious problem, such as asthma, lung disease, emphysema, heart problems, and pneumonia. If your shortness of breath continues, you may need tests and treatment. Watch for any changes in your breathing and other symptoms. Follow-up care is a key part of your treatment and safety. Be sure to make and go to all appointments, and call your doctor if you are having problems. It's also a good idea to know your test results and keep a list of the medicines you take. How can you care for yourself at home? · Do not smoke or allow others to smoke around you. If you need help quitting, talk to your doctor about stop-smoking programs and medicines. These can increase your chances of quitting for good. · Get plenty of rest and sleep. · Take your medicines exactly as prescribed. Call your doctor if you think you are having a problem with your medicine. · Find healthy ways to deal with stress. ¨ Exercise daily. ¨ Get plenty of sleep. ¨ Eat regularly and well. When should you call for help? Call 911 anytime you think you may need emergency care. For example, call if:    · You have severe shortness of breath.     · You have symptoms of a heart attack. These may include:  ¨ Chest pain or pressure, or a strange feeling in the chest.  ¨ Sweating. ¨ Shortness of breath. ¨ Nausea or vomiting. ¨ Pain, pressure, or a strange feeling in the back, neck, jaw, or upper belly or in one or both shoulders or arms. ¨ Lightheadedness or sudden weakness. ¨ A fast or irregular heartbeat. After you call 911, the  may tell you to chew 1 adult-strength or 2 to 4 low-dose aspirin. Wait for an ambulance. Do not try to drive yourself.    Call your doctor now or seek immediate medical care if:    · Your shortness of breath gets worse or you start to wheeze.  Wheezing is a high-pitched sound when you breathe.     · You wake up at night out of breath or have to prop your head up on several pillows to breathe.     · You are short of breath after only light activity or while at rest.    Watch closely for changes in your health, and be sure to contact your doctor if:    · You do not get better over the next 1 to 2 days. Where can you learn more? Go to http://mello-amber.info/. Enter S780 in the search box to learn more about \"Shortness of Breath: Care Instructions. \"  Current as of: December 6, 2017  Content Version: 11.7  © 3321-1218 [employer]. Care instructions adapted under license by [employer] (which disclaims liability or warranty for this information). If you have questions about a medical condition or this instruction, always ask your healthcare professional. [address] any warranty or liability for your use of this information.

## 2018-08-02 NOTE — PROGRESS NOTES
Problem: Mobility Impaired (Adult and Pediatric) Goal: *Acute Goals and Plan of Care (Insert Text) Discharge Goals: 
(1.)Ms. Niki Ramos will move from supine to sit and sit to supine , scoot up and down and roll side to side in bed with INDEPENDENT within 3 treatment day(s). (2.)Ms. Niki Ramos will transfer from bed to chair and chair to bed with MODIFIED INDEPENDENT using the least restrictive device within 3 treatment day(s). (3.)Ms. Niki Ramos will ambulate with MODIFIED INDEPENDENCE for 250+ feet with stable O2 sats and with the least restrictive device within 3 treatment day(s). ________________________________________________________________________________________________ PHYSICAL THERAPY: Daily Note, Treatment Day: 1st, AM 8/2/2018 INPATIENT: Hospital Day: 4 [place] / Product Type: Medicare /  
  
NAME/AGE/GENDER: Miesha Gonsales is a 80 y.o. female PRIMARY DIAGNOSIS: Pleural effusion [J90] Shortness of breath Shortness of breath Procedure(s) (LRB): ULTRASOUND (Bilateral) THORACENTESIS (Bilateral) 2 Days Post-Op ICD-10: Treatment Diagnosis:  
 · Generalized Muscle Weakness (M62.81) · Difficulty in walking, Not elsewhere classified (R26.2) · History of falling (Z91.81) Precaution/Allergies: 
Adhesive ASSESSMENT:  
 
Ms. Niki Ramos was supine upon contact and agreeable to PT. Wants to go home but motivated to ambulate with me. Patient able to increase gait distance to 250' without use of rolling walker demonstrating occasional path deviations/increased trunk sway but no LOB requiring PT intervention. O2 sats maintained above 90% on room air. Overall good progress towards physical therapy goals. No goals have been met thus far. Will continue efforts. This section established at most recent assessment PROBLEM LIST (Impairments causing functional limitations): 1. Decreased Strength 2. Decreased ADL/Functional Activities 3.  Decreased Transfer Abilities 4. Decreased Ambulation Ability/Technique 5. Decreased Balance 6. Decreased Activity Tolerance 7. Decreased Pacing Skills 8. Decreased Work Simplification/Energy Conservation Techniques 9. Decreased [place] with Home Exercise Program 
 INTERVENTIONS PLANNED: (Benefits and precautions of physical therapy have been discussed with the patient.) 1. Balance Exercise 2. Bed Mobility 3. Family Education 4. Gait Training 5. Home Exercise Program (HEP) 6. Manual Therapy 7. Neuromuscular Re-education/Strengthening 8. Range of Motion (ROM) 9. Therapeutic Activites 10. Therapeutic Exercise/Strengthening 11. Transfer Training 12. Group Therapy TREATMENT PLAN: Frequency/Duration: 3 times a week for duration of hospital stay Rehabilitation Potential For Stated Goals: Good RECOMMENDED REHABILITATION/EQUIPMENT: (at time of discharge pending progress): Due to the probability of continued deficits (see above) this patient will likely need continued skilled physical therapy after discharge. Equipment:  
 None at this time HISTORY:  
History of Present Injury/Illness (Reason for Referral): [facility] 81 [facility] with PMHx of  CAD, pAFib on no OAC because she refused, HTN, HLP, ESRD on HD MWF, Chronic anemia due ro CKD, Chron's disease s/p ileostomy who complains of sudden onset of SOB that started around 3AM associated with nonproductive cough. Denies chest pain or spikes of fevers. Complains of increased b/l LE edema. LAst HD was Friday, uneventful. ED work-up showed WBC:6.8, Cr:9.01, K:5.9, Trop I:0.08. CXR with pulmonary edema Received IV lasix and . Nephrology consulted for emergency HD.  
  
10+ point ROS done and is negative except as noted in HPI Past Medical History/Comorbidities: [facility]  has a past medical history of Abdominal pain, chronic, right lower quadrant (5/16/2013); Anticoagulated on Coumadin; Aortic stenosis (06/26/2015); Arthritis;  [facility] (1/14/2016); CAD (coronary artery disease) (06/2013); Carotid stenosis without Infarct (1/19/2016); Chronic anemia (6/26/2013); Chronic kidney disease; Chronic renal failure (1/19/2016); Crohn's disease ([facility] Dyslipidemia (6/26/2013); Edema (1/19/2016); ESRD (end stage renal disease) on dialysis [facility] (05/16/2013); Former smoker; GERD (gastroesophageal reflux disease); Gout (6/26/2013); History of ileostomy (5/16/2013); History of peritonitis (2013); History of sepsis (2009); Hypercholesteremia; Hyperlipidemia (1/19/2016); Hypertension; Hypokalemia (5/16/2013); Hypomagnesemia (6/23/2009); Ileostomy in place [facility]); Intractable nausea and vomiting (5/16/2013); Leukocytosis (5/16/2013); NSTEMI (non-ST elevated myocardial infarction) ([facility] (6/26/2013); Palpitations (1/19/2016); Paroxysmal atrial fibrillation ([facility] (1/19/2016); Paroxysmal tachycardia ([facility] (1/19/2016); S/P AAA repair; Serum lipase elevation (5/16/2013); and Tricuspid regurgitation (EF 55-60%). She also has no past medical history of Adverse effect of anesthesia; Difficult intubation; Malignant hyperthermia due to anesthesia; or Pseudocholinesterase deficiency. Ms. Long Jones  has a past surgical history that includes hx cataract removal (Bilateral, 2005); hx lap cholecystectomy (2013); pr abdomen surgery proc unlisted; hx colectomy (1983); hx breast biopsy (Bilateral,  ); vascular surgery procedure unlist (Left, 2009); vascular surgery procedure unlist; vascular surgery procedure unlist (Right, 2014); hx aaa repair (2013); and colonoscopy (N/A, 10/20/2016). Social History/Living Environment:  
Home Environment: Private residence # Steps to Enter: 0 Wheelchair Ramp: Yes One/Two Story Residence: One story Living Alone: Yes Support Systems: Child(mt) Patient Expects to be Discharged to[de-identified] Private residence Current DME Used/Available at Home: Walker, rolling, Shower chair, [employer], straight Tub or Shower Type: Tub/Shower combination Prior Level of Function/Work/Activity: 
Independent with ADLs and ambulation, one recent fall, lives alone, no steps to enter, drives, daughter lives close by  
Number of Personal Factors/Comorbidities that affect the Plan of Care: 3+: HIGH COMPLEXITY EXAMINATION:  
Most Recent Physical Functioning:  
Gross Assessment: 
  
         
  
Posture: 
  
Balance: 
Sitting: Intact Standing: Impaired Standing - Static: Good Standing - Dynamic : Fair Bed Mobility: 
Supine to Sit: Supervision Wheelchair Mobility: 
  
Transfers: 
Sit to Stand: Stand-by assistance Stand to Sit: Stand-by assistance Gait: 
  
Base of Support: Narrowed Speed/Deysi: Slow;Shuffled Step Length: Right shortened;Left shortened Gait Abnormalities: Decreased step clearance; Path deviations; Shuffling gait;Trunk sway increased Distance (ft): 250 Feet (ft) Ambulation - Level of Assistance: Stand-by assistance;Contact guard assistance Interventions: Safety awareness training; Tactile cues; Verbal cues Body Structures Involved: 1. Heart 2. Lungs 3. Bones 4. Joints 5. Muscles 6. Ligaments Body Functions Affected: 1. Cardio 2. Respiratory 3. Neuromusculoskeletal 
4. Movement Related Activities and Participation Affected: 1. Mobility 2. Self Care 3. Domestic Life 4. Interpersonal Interactions and Relationships 5. Community, Social and [place] Number of elements that affect the Plan of Care: 4+: HIGH COMPLEXITY CLINICAL PRESENTATION:  
Presentation: Evolving clinical presentation with changing clinical characteristics: MODERATE COMPLEXITY CLINICAL DECISION MAKING:  
[employer] 6 Clicks Basic Mobility Inpatient Short Form How much difficulty does the patient currently have. .. Unable A Lot A Little None 1. Turning over in bed (including adjusting bedclothes, sheets and blankets)? [] 1   [] 2   [] 3   [x] 4  
2.   Sitting down on and standing up from a chair with arms ( e.g., wheelchair, bedside commode, etc.)   [] 1   [] 2   [x] 3   [] 4  
3. Moving from lying on back to sitting on the side of the bed? [] 1   [] 2   [] 3   [x] 4 How much help from another person does the patient currently need. .. Total A Lot A Little None 4. Moving to and from a bed to a chair (including a wheelchair)? [] 1   [] 2   [x] 3   [] 4  
5. Need to walk in hospital room? [] 1   [] 2   [x] 3   [] 4  
6. Climbing 3-5 steps with a railing? [] 1   [x] 2   [] 3   [] 4  
© 2007, Trustees of [address] [place] [address] [address] [address], under license to [employer]. All rights reserved Score:  Initial: 19 Most Recent: X (Date: -- ) Interpretation of Tool:  Represents activities that are increasingly more difficult (i.e. Bed mobility, Transfers, Gait). Score 24 23 22-20 19-15 14-10 9-7 6 Modifier CH CI CJ CK CL CM CN   
 
? Mobility - Walking and Moving Around:  
  - CURRENT STATUS: CK - 40%-59% impaired, limited or restricted  - GOAL STATUS: CJ - 20%-39% impaired, limited or restricted  - D/C STATUS:  ---------------To be determined--------------- [place] / [place] B / Product Type: Medicare /   
 
Medical Necessity:    
· Patient demonstrates good rehab potential due to higher previous functional level. Reason for Services/Other Comments: 
· Patient continues to require skilled intervention due to decreased activity tolerance. Use of outcome tool(s) and clinical judgement create a POC that gives a: Clear prediction of patient's progress: LOW COMPLEXITY  
  
 
 
 
TREATMENT:  
(In addition to Assessment/Re-Assessment sessions the following treatments were rendered) Pre-treatment Symptoms/Complaints: see above Pain: Initial:  
Pain Intensity 1: 0  Post Session:  0/10 Therapeutic Activity: (    12 minutes):   Therapeutic activities including bed mobility, transfer training, ambulation on level ground, static/dynamic standing balance training, and patient education and safety awareness to improve mobility, strength, balance and coordination. Required minimal Safety awareness training; Tactile cues; Verbal cues verbal cuing to promote static and dynamic balance in standing and promote coordination of bilateral, lower extremity(s). Braces/Orthotics/Lines/Etc:  
· O2 Device: Room air Treatment/Session Assessment:   
· Response to Treatment:  See above · Interdisciplinary Collaboration:  
o Physical Therapy Assistant 
o Registered Nurse · After treatment position/precautions:  
o patient left with respiratory therapist to perform ambulating sat · Compliance with Program/Exercises: Will assess as treatment progresses. · Recommendations/Intent for next treatment session: \"Next visit will focus on advancements to more challenging activities and reduction in assistance provided\". Total Treatment Duration: PT Patient Time In/Time Out Time In: 1110 Time Out: 1122 Zoraida Sesay, PTA

## 2018-08-02 NOTE — PROGRESS NOTES
[facility] CARDIOLOGY PROGRESS NOTE 
      
 
8/2/2018 8:59 AM 
 
Admit Date: 7/30/2018 Subjective:  
Original Consult A Fib. Mrs Park Brown has a history of permanent AF and wishes not to be on [address] [place] [address] Road. HR was in the 130 with improved breathing after thoracentesis. Dose of cardizem increased yesterday and switched to lopressor. Overnight pt VSS with with HR in the 60-90 range. The patient has no complaints today and wishes to go home. ROS: 
Cardiovascular:  As noted above Objective:  
  
Vitals:  
 08/01/18 2007 08/01/18 2356 08/02/18 0758 08/02/18 2918 BP: 91/55 132/55 126/59 135/80 Pulse: 71 91 82 61 Resp: 20 20 20 20 Temp: 98.3 °F (36.8 °C) 98 °F (36.7 °C) 98 °F (36.7 °C) 98.6 °F (37 °C) SpO2: 95% 92% 94% 92% Weight:   63.8 kg (140 lb 9.6 oz) Height:      
 
 
Physical Exam: 
General-No Acute Distress Neck- supple, no JVD 
CV- irregular rate and rhythm no MRG Lung- clear bilaterally Abd- soft, nontender, nondistended Ext- no edema bilaterally. Skin- warm and dry Data Review:  
Recent Labs 08/02/18 
 8594  08/01/18 
 5349  07/31/18 
 8248  07/30/18 
 2343 NA  139  136  140   --   
K  3.8  4.6  4.5   --   
MG   --    --   2.0   --   
BUN  13  22  16   --   
CREA  5.97*  8.30*  5.97*   --   
GLU  106*  93  95   --   
WBC   --   4.8  5.4   --   
HGB   --   10.8*  10.5*   --   
HCT   --   33.9*  33.4*   --   
PLT   --   181  190   --   
TROIQ   --    --   0.10*  0.10* CHOL   --    --   112   --   
LDLC   --    --   46.4   --   
HDL   --    --   29*   --   
 
 
Assessment/Plan:  
 
Principal Problem: 
  Shortness of breath (7/30/2018) Per Primary Team 
 
Active Problems: 
  Pleural effusion, left (7/2/2009) Per Pulmonary ESRD (end stage renal disease) on dialysis ([facility] (5/16/2013) Per Nephrology, History of ileostomy (5/16/2013) Chronic anemia (6/26/2013) H/H stable 10.8 yesterday Hypertension, benign (1/19/2016) Controlled on current medications CAD (coronary artery disease) (1/19/2016) Stable Paroxysmal atrial fibrillation ([facility] (1/19/2016) Rate Controled on Diltiazem and BB. Pt wishes not to be on [address] Pulmonary edema (7/30/2018) Per Primary Team 
 
  Pleural effusion, right (7/31/2018) Per Pulmonology Annmarie Chavarria NP 
8/2/2018 8:59 AM 
 
Improved HR control on lopressor/cardizem; has opted for no anticoagulation. F/u outpt with Dr Kristi Peacock MD 
8/2/2018

## 2018-08-02 NOTE — PROGRESS NOTES
Pt alert to person, place, time. No acute distress on RA. Rt arm +bruit/thrill. Ileostomy clean, dry, intact. Call bell in reach

## 2018-08-02 NOTE — PROGRESS NOTES
Gave discharge instructions to the pt. The pt voices a clear understanding. Iv's removed x 2 both sites dressed.

## 2018-08-02 NOTE — PROGRESS NOTES
[facility] Pt stable. Assessment complete. Pt is lying in bed, watching tv. Resp even, unlabored. Pt is alert, orient X 3. Pt appears in no acute distress at this time. Pt is on RA. Pt denies SOB. Pt encouraged to call for assistance, call light in reach. Safety measures in place. Will continue to monitor

## 2018-08-03 ENCOUNTER — PATIENT OUTREACH (OUTPATIENT)
Dept: CASE MANAGEMENT | Age: 83
End: 2018-08-03

## 2018-08-03 NOTE — PROGRESS NOTES
Care Coordinator called home # [phone], no answer, voicemail box full unable to leave voicemail message. Care Coordinator will make several more attempts to reach patient for Transitions of Care Outreach. This note will not be viewable in [address]

## 2018-08-06 ENCOUNTER — PATIENT OUTREACH (OUTPATIENT)
Dept: CASE MANAGEMENT | Age: 83
End: 2018-08-06

## 2018-08-07 NOTE — PROGRESS NOTES
Transition of Care Discharge Follow-up Questionnaire   Date/Time of Call:   August 6, 2018 4:08PM   What was the patient hospitalized for? Shortness of breath        Does the patient understand his/her diagnosis and/or treatment and what happened during the hospitalization? Care Coordinator spoke with patient who agreed to Transitions of Care Outreach Call. Patient states understanding of diagnosis and treatment plan during hospitalization. Did the patient receive discharge instructions? Yes   CM Assessed Risk for Readmission:           Patient stated Risk for Readmission:      Moderate risk for readmit related to complications from shortness of breath and other comorbidities. Patient states she is feeling fine and states she has no risk for readmission. Review any discharge instructions (see discharge instructions/AVS in [facility]). Ask patient if they understand these. Do they have any questions? Patient states understanding of discharge instructions, patient states no questions. Were home services ordered (nursing, PT, OT, ST, etc.)? No home health services ordered. If so, has the first visit occurred? If not, why? (Assist with coordination of services if necessary. )   NA   Was any DME ordered? No durable medical equipment ordered. If so, has it been received? If not, why?  (Assist patient in obtaining DME orders &/or equipment if necessary. ) NA         Complete a review of all medications (new, continued and discontinued meds per the D[facility]C [facility] and [facility] Care Coordinator reviewed all medications with patient per connect care who verbalized understanding. One new medication ordered and current medication changed. Were all new prescriptions filled?   If not, why?  (Assist patient in obtaining medications if necessary  escalate for CCM &/or SW if ongoing issues are verbalized by pt or anticipated)   Yes         Does the patient understand the purpose and dosing instructions for all medications? (If patient has questions, provide explanation and education.)   Patient states understanding of current medications and dosing instructions. Care Coordinator educated patient on the importance of medication compliance and reporting medication side effects to PCP/Specialist.      Does the patient have any problems in performing ADLs? (If patient is unable to perform ADLs  what is the limiting factor(s)? Do they have a support system that can assist? If no support system is present, discuss possible assistance that they may be able to obtain. Escalate for CCM/SW if ongoing issues are verbalized by pt or anticipated)   Patient states she is independent with ADL's and ambulation. Patient states she is doing pretty good, no shortness of breath but is tired due to dialysis earlier today. Patient states her daughter is supportive and assist her when she requires assistance. Does the patient have all follow-up appointments scheduled? 7 day f/up with PCP?   (f/up with PCP may be w/in 14 days if patient has a f/up with their specialist w/in 7 days)    7-14 day f/up with specialist?   (or per discharge instructions)    If f/up has not been made  what actions has the care coordinator made to accomplish this? Has transportation been arranged? Care Coordinator educated patient on the importance of scheduling follow up appointment with PCP within 7 days of hospital discharge. Patient state she has not found a PCP, Care Coordinator offered assistance to patient to assist patient searching for PCP, patient declined and states she needs more time to search and make decision about PCP on her own. NA        Yes   Any other questions or concerns expressed by the patient?      No further needs identified: Patient instructed to call Care Coordinator if further questions or concerns arise   Schedule next appointment with GUILLERMO Izaguirre or refer to RN Case Manager/ per the workflow guidelines. When is care coordinators next follow-up call scheduled? If referred for CCM  what RN care manager was the referral assigned? NA            Care Coordinator provided contact information if assistance is needed for concerns or questions. NA   ARI Call Completed By: ELLEN Floyd Help ACO  Care Coordinator       This note will not be viewable in [address]

## 2018-08-30 LAB
FUNGUS CULTURE, RFCO2T: NEGATIVE
FUNGUS SMEAR, RFCO1T: NORMAL
FUNGUS SPEC CULT: NORMAL
FUNGUS STAIN, 188244: NORMAL
REFLEX TO ID, RFCO3T: NORMAL
SPECIMEN SOURCE: NORMAL
SPECIMEN SOURCE: NORMAL

## 2018-09-07 ENCOUNTER — HOSPITAL ENCOUNTER (INPATIENT)
Age: 83
LOS: 3 days | Discharge: HOME HEALTH | DRG: 640 | End: 2018-09-10
Attending: EMERGENCY MEDICINE | Admitting: INTERNAL MEDICINE
Payer: MEDICARE

## 2018-09-07 ENCOUNTER — APPOINTMENT (OUTPATIENT)
Dept: GENERAL RADIOLOGY | Age: 83
DRG: 640 | End: 2018-09-07
Attending: EMERGENCY MEDICINE
Payer: MEDICARE
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
## 2018-09-07 DIAGNOSIS — I47.20 VENTRICULAR TACHYCARDIA: ICD-10-CM

## 2018-09-07 DIAGNOSIS — E87.5 ACUTE HYPERKALEMIA: Primary | ICD-10-CM

## 2018-09-07 PROBLEM — E87.20 METABOLIC ACIDOSIS: Status: ACTIVE | Noted: 2018-09-07

## 2018-09-07 PROBLEM — N18.6 ESRD (END STAGE RENAL DISEASE) (HCC): Status: ACTIVE | Noted: 2018-09-07

## 2018-09-07 LAB
ALBUMIN SERPL-MCNC: 4.1 G/DL (ref 3.2–4.6)
ALBUMIN/GLOB SERPL: 0.9 {RATIO} (ref 1.2–3.5)
ALP SERPL-CCNC: 76 U/L (ref 50–136)
ALT SERPL-CCNC: 27 U/L (ref 12–65)
ANION GAP SERPL CALC-SCNC: 23 MMOL/L (ref 7–16)
AST SERPL-CCNC: 14 U/L (ref 15–37)
ATRIAL RATE: 107 BPM
BASOPHILS # BLD: 0 K/UL (ref 0–0.2)
BASOPHILS NFR BLD: 0 % (ref 0–2)
BILIRUB SERPL-MCNC: 0.6 MG/DL (ref 0.2–1.1)
BUN SERPL-MCNC: 112 MG/DL (ref 8–23)
CALCIUM SERPL-MCNC: 7.8 MG/DL (ref 8.3–10.4)
CALCULATED R AXIS, ECG10: -34 DEGREES
CALCULATED T AXIS, ECG11: 130 DEGREES
CHLORIDE SERPL-SCNC: 88 MMOL/L (ref 98–107)
CO2 SERPL-SCNC: 17 MMOL/L (ref 21–32)
CREAT SERPL-MCNC: 18.1 MG/DL (ref 0.6–1)
DIAGNOSIS, 93000: NORMAL
DIFFERENTIAL METHOD BLD: ABNORMAL
EOSINOPHIL # BLD: 0 K/UL (ref 0–0.8)
EOSINOPHIL NFR BLD: 0 % (ref 0.5–7.8)
ERYTHROCYTE [DISTWIDTH] IN BLOOD BY AUTOMATED COUNT: 17.4 %
GLOBULIN SER CALC-MCNC: 4.6 G/DL (ref 2.3–3.5)
GLUCOSE SERPL-MCNC: 115 MG/DL (ref 65–100)
HCT VFR BLD AUTO: 46.6 % (ref 35.8–46.3)
HGB BLD-MCNC: 15.7 G/DL (ref 11.7–15.4)
IMM GRANULOCYTES # BLD: 0.1 K/UL (ref 0–0.5)
IMM GRANULOCYTES NFR BLD AUTO: 1 % (ref 0–5)
INR PPP: 1.3
LYMPHOCYTES # BLD: 1.8 K/UL (ref 0.5–4.6)
LYMPHOCYTES NFR BLD: 15 % (ref 13–44)
MCH RBC QN AUTO: 34 PG (ref 26.1–32.9)
MCHC RBC AUTO-ENTMCNC: 33.7 G/DL (ref 31.4–35)
MCV RBC AUTO: 100.9 FL (ref 79.6–97.8)
MONOCYTES # BLD: 0.8 K/UL (ref 0.1–1.3)
MONOCYTES NFR BLD: 7 % (ref 4–12)
NEUTS SEG # BLD: 9.8 K/UL (ref 1.7–8.2)
NEUTS SEG NFR BLD: 78 % (ref 43–78)
NRBC # BLD: 0 K/UL (ref 0–0.2)
PLATELET # BLD AUTO: 219 K/UL (ref 150–450)
PMV BLD AUTO: 10.7 FL (ref 9.4–12.3)
POTASSIUM SERPL-SCNC: 3.4 MMOL/L (ref 3.5–5.1)
POTASSIUM SERPL-SCNC: 6.9 MMOL/L (ref 3.5–5.1)
POTASSIUM SERPL-SCNC: 7.2 MMOL/L (ref 3.5–5.1)
PROT SERPL-MCNC: 8.7 G/DL (ref 6.3–8.2)
PROTHROMBIN TIME: 15.2 SEC (ref 11.5–14.5)
Q-T INTERVAL, ECG07: 430 MS
QRS DURATION, ECG06: 146 MS
QTC CALCULATION (BEZET), ECG08: 560 MS
RBC # BLD AUTO: 4.62 M/UL (ref 4.05–5.2)
SODIUM SERPL-SCNC: 128 MMOL/L (ref 136–145)
VENTRICULAR RATE, ECG03: 102 BPM
WBC # BLD AUTO: 12.5 K/UL (ref 4.3–11.1)

## 2018-09-07 PROCEDURE — 84132 ASSAY OF SERUM POTASSIUM: CPT

## 2018-09-07 PROCEDURE — 85610 PROTHROMBIN TIME: CPT

## 2018-09-07 PROCEDURE — 74011250636 HC RX REV CODE- 250/636: Performed by: INTERNAL MEDICINE

## 2018-09-07 PROCEDURE — 90935 HEMODIALYSIS ONE EVALUATION: CPT

## 2018-09-07 PROCEDURE — 85025 COMPLETE CBC W/AUTO DIFF WBC: CPT

## 2018-09-07 PROCEDURE — 74011000250 HC RX REV CODE- 250: Performed by: EMERGENCY MEDICINE

## 2018-09-07 PROCEDURE — 99285 EMERGENCY DEPT VISIT HI MDM: CPT | Performed by: EMERGENCY MEDICINE

## 2018-09-07 PROCEDURE — 74011250636 HC RX REV CODE- 250/636: Performed by: EMERGENCY MEDICINE

## 2018-09-07 PROCEDURE — 93005 ELECTROCARDIOGRAM TRACING: CPT | Performed by: EMERGENCY MEDICINE

## 2018-09-07 PROCEDURE — 71046 X-RAY EXAM CHEST 2 VIEWS: CPT

## 2018-09-07 PROCEDURE — 74011250637 HC RX REV CODE- 250/637: Performed by: INTERNAL MEDICINE

## 2018-09-07 PROCEDURE — 96375 TX/PRO/DX INJ NEW DRUG ADDON: CPT | Performed by: EMERGENCY MEDICINE

## 2018-09-07 PROCEDURE — 74011636637 HC RX REV CODE- 636/637: Performed by: EMERGENCY MEDICINE

## 2018-09-07 PROCEDURE — 65660000000 HC RM CCU STEPDOWN

## 2018-09-07 PROCEDURE — 96374 THER/PROPH/DIAG INJ IV PUSH: CPT | Performed by: EMERGENCY MEDICINE

## 2018-09-07 PROCEDURE — 80053 COMPREHEN METABOLIC PANEL: CPT

## 2018-09-07 PROCEDURE — 5A1D70Z PERFORMANCE OF URINARY FILTRATION, INTERMITTENT, LESS THAN 6 HOURS PER DAY: ICD-10-PCS | Performed by: INTERNAL MEDICINE

## 2018-09-07 RX ORDER — NOREPINEPHRINE BIT/0.9 % NACL 4MG/250ML
PLASTIC BAG, INJECTION (ML) INTRAVENOUS
Status: DISCONTINUED
Start: 2018-09-07 | End: 2018-09-07

## 2018-09-07 RX ORDER — ZOLPIDEM TARTRATE 5 MG/1
5 TABLET ORAL
Status: DISCONTINUED | OUTPATIENT
Start: 2018-09-07 | End: 2018-09-10 | Stop reason: HOSPADM

## 2018-09-07 RX ORDER — METOPROLOL TARTRATE 50 MG/1
50 TABLET ORAL 2 TIMES DAILY
Status: DISCONTINUED | OUTPATIENT
Start: 2018-09-07 | End: 2018-09-08

## 2018-09-07 RX ORDER — OMEPRAZOLE 20 MG/1
20 CAPSULE, DELAYED RELEASE ORAL 2 TIMES DAILY
COMMUNITY

## 2018-09-07 RX ORDER — DILTIAZEM HYDROCHLORIDE 180 MG/1
180 CAPSULE, COATED, EXTENDED RELEASE ORAL DAILY
Status: DISCONTINUED | OUTPATIENT
Start: 2018-09-08 | End: 2018-09-09

## 2018-09-07 RX ORDER — HEPARIN SODIUM 5000 [USP'U]/ML
5000 INJECTION, SOLUTION INTRAVENOUS; SUBCUTANEOUS EVERY 8 HOURS
Status: DISCONTINUED | OUTPATIENT
Start: 2018-09-07 | End: 2018-09-10 | Stop reason: HOSPADM

## 2018-09-07 RX ORDER — SEVELAMER CARBONATE 800 MG/1
1600 TABLET, FILM COATED ORAL
Status: DISCONTINUED | OUTPATIENT
Start: 2018-09-08 | End: 2018-09-10 | Stop reason: HOSPADM

## 2018-09-07 RX ORDER — PANTOPRAZOLE SODIUM 40 MG/1
40 TABLET, DELAYED RELEASE ORAL
Status: DISCONTINUED | OUTPATIENT
Start: 2018-09-08 | End: 2018-09-10 | Stop reason: HOSPADM

## 2018-09-07 RX ORDER — SODIUM CHLORIDE 0.9 % (FLUSH) 0.9 %
5-10 SYRINGE (ML) INJECTION EVERY 8 HOURS
Status: DISCONTINUED | OUTPATIENT
Start: 2018-09-07 | End: 2018-09-10 | Stop reason: HOSPADM

## 2018-09-07 RX ORDER — ADHESIVE BANDAGE
30 BANDAGE TOPICAL DAILY PRN
Status: DISCONTINUED | OUTPATIENT
Start: 2018-09-07 | End: 2018-09-10 | Stop reason: HOSPADM

## 2018-09-07 RX ORDER — SODIUM BICARBONATE 1 MEQ/ML
50 SYRINGE (ML) INTRAVENOUS ONCE
Status: COMPLETED | OUTPATIENT
Start: 2018-09-07 | End: 2018-09-07

## 2018-09-07 RX ORDER — ACETAMINOPHEN 325 MG/1
650 TABLET ORAL
Status: DISCONTINUED | OUTPATIENT
Start: 2018-09-07 | End: 2018-09-10 | Stop reason: HOSPADM

## 2018-09-07 RX ORDER — CALCIUM GLUCONATE 94 MG/ML
1 INJECTION, SOLUTION INTRAVENOUS
Status: DISCONTINUED | OUTPATIENT
Start: 2018-09-07 | End: 2018-09-07 | Stop reason: SDUPTHER

## 2018-09-07 RX ORDER — SODIUM CHLORIDE 0.9 % (FLUSH) 0.9 %
5-10 SYRINGE (ML) INJECTION AS NEEDED
Status: DISCONTINUED | OUTPATIENT
Start: 2018-09-07 | End: 2018-09-10 | Stop reason: HOSPADM

## 2018-09-07 RX ORDER — ASPIRIN 81 MG/1
81 TABLET ORAL DAILY
Status: DISCONTINUED | OUTPATIENT
Start: 2018-09-08 | End: 2018-09-10 | Stop reason: HOSPADM

## 2018-09-07 RX ORDER — ONDANSETRON 2 MG/ML
4 INJECTION INTRAMUSCULAR; INTRAVENOUS
Status: DISCONTINUED | OUTPATIENT
Start: 2018-09-07 | End: 2018-09-10 | Stop reason: HOSPADM

## 2018-09-07 RX ORDER — DEXTROSE 50 % IN WATER (D50W) INTRAVENOUS SYRINGE
50
Status: COMPLETED | OUTPATIENT
Start: 2018-09-07 | End: 2018-09-07

## 2018-09-07 RX ADMIN — HEPARIN SODIUM 5000 UNITS: 5000 INJECTION INTRAVENOUS; SUBCUTANEOUS at 20:58

## 2018-09-07 RX ADMIN — DEXTROSE MONOHYDRATE 25 G: 25 INJECTION, SOLUTION INTRAVENOUS at 14:01

## 2018-09-07 RX ADMIN — Medication 10 ML: at 21:00

## 2018-09-07 RX ADMIN — METOPROLOL TARTRATE 50 MG: 50 TABLET ORAL at 20:59

## 2018-09-07 RX ADMIN — SODIUM BICARBONATE 50 MEQ: 84 INJECTION, SOLUTION INTRAVENOUS at 14:01

## 2018-09-07 RX ADMIN — CALCIUM GLUCONATE 1 G: 94 INJECTION, SOLUTION INTRAVENOUS at 13:20

## 2018-09-07 RX ADMIN — INSULIN HUMAN 10 UNITS: 100 INJECTION, SOLUTION PARENTERAL at 14:01

## 2018-09-07 RX ADMIN — ZOLPIDEM TARTRATE 5 MG: 5 TABLET ORAL at 21:00

## 2018-09-07 NOTE — DIALYSIS
TRANSFER - OUT REPORT: 
 
Verbal report given to [facility] AT [facility], RN(name) on Bello Orellana  being transferred to [facility](unit) for routine progression of care Report consisted of patients Situation, Background, Assessment and  
Recommendations(SBAR). Information from the following report(s) SBAR was reviewed with the receiving nurse. Lines:  
Peripheral IV 09/07/18 Left Antecubital (Active) Opportunity for questions and clarification was provided. Patient transported with: 
 [facility]

## 2018-09-07 NOTE — ED PROVIDER NOTES
HPI Comments: Patient is an 80-year-old female presenting with right chest wall pain after she fell getting out of a bathtub earlier today. She also reports she has not gone to dialysis for about a week and feels nauseated. History of atrial fibrillation with RVR. Currently taking Coumadin. Patient is a 80 y.o. female presenting with fall. The history is provided by the patient. No  was used. Fall Associated symptoms include nausea. Pertinent negatives include no fever, no abdominal pain, no vomiting and no headaches. Past Medical History:  
Diagnosis Date  Abdominal pain, chronic, right lower quadrant 5/16/2013  Anticoagulated on Coumadin   
 was told to hold for 5 days- held as 10/15/16  Aortic stenosis 06/26/2015  
 mild to moderate per echo  Arthritis   
 minimal  
 Atrial fibrillation, chronic (HCC) 1/14/2016  CAD (coronary artery disease) 06/2013  
 mild MI   
 Carotid stenosis without Infarct 1/19/2016  Chronic anemia 6/26/2013  Chronic kidney disease ESRD stage 4, M, W, F dialysis. [facility] in  [facility]  Chronic renal failure 1/19/2016  Crohn's disease ([facility] [address]   
 colectomy  Dyslipidemia 6/26/2013  Edema 1/19/2016  ESRD (end stage renal disease) on dialysis ([address] 05/16/2013  
 right arm functioning- fistula----M-W-F AT [address] [place] [address]  
 Former smoker  GERD (gastroesophageal reflux disease)   
 controlled on meds takes prilosec  Gout 6/26/2013  History of ileostomy 5/16/2013  History of peritonitis 2013  
 dialysis associated  History of sepsis 2009  Hypercholesteremia  Hyperlipidemia 1/19/2016  Hypertension   
  well controlled by medication  Hypokalemia 5/16/2013  Hypomagnesemia 6/23/2009  Ileostomy in place [facility])  Intractable nausea and vomiting 5/16/2013  Leukocytosis 5/16/2013  
 NSTEMI (non-ST elevated myocardial infarction) ([address] 6/26/2013  Palpitations 1/19/2016  Paroxysmal atrial fibrillation ([facility] [address] 1/19/2016  Paroxysmal tachycardia ([facility] [address] 1/19/2016  S/P AAA repair \" aneurysm in my stomach repaired\"  Serum lipase elevation 5/16/2013  Tricuspid regurgitation EF 55-60%  
 moderate to severe per echo 6/26/15 Past Surgical History:  
Procedure Laterality Date  ABDOMEN SURGERY PROC UNLISTED    
 insertion and removal of infected PD cath  COLONOSCOPY N/A 10/20/2016 ILEOSCOPY THROUGH OSTOMY/ 25 performed by Savanna Terry MD at [address] HX AAA REPAIR  2013  HX BREAST BIOPSY Bilateral    
  x3 on left breast  
 HX CATARACT REMOVAL Bilateral 2005  
 wit IOL  HX COLECTOMY  1983  
 colon removed / ileostomy  HX LAP CHOLECYSTECTOMY  2013  VASCULAR SURGERY PROCEDURE UNLIST Left 2009  
 fistulagram 8/09, LUE  VASCULAR SURGERY PROCEDURE UNLIST    
 7/09 L subclavian hemodialysis cath  VASCULAR SURGERY PROCEDURE UNLIST Right 2014  
 fistula Family History:  
Problem Relation Age of Onset  Cancer Mother  Stroke Father  Hypertension Father  Heart Disease Brother  Heart Disease Brother  Stroke Sister  Hypertension Sister Social History Social History  Marital status:  Spouse name: N/A  
 Number of children: N/A  
 Years of education: N/A Occupational History  Not on file. Social History Main Topics  Smoking status: Former Smoker Packs/day: 0.25 Years: 5.00 Quit date: 8/22/1971  Smokeless tobacco: Never Used Comment: quit years ago  Alcohol use No  
 Drug use: No  
 Sexual activity: Not on file Other Topics Concern  Not on file Social History Narrative ALLERGIES: Adhesive Review of Systems Constitutional: Negative for fever. HENT: Negative for sore throat. Respiratory: Negative for cough, chest tightness and shortness of breath. Cardiovascular: Negative for leg swelling. Gastrointestinal: Positive for nausea. Negative for abdominal pain and vomiting. Genitourinary: Negative for dysuria. Musculoskeletal: Negative for back pain. Neurological: Negative for syncope and headaches. Psychiatric/Behavioral: Negative for confusion. Vitals:  
 09/07/18 1218 09/07/18 1309 BP: 90/58 112/59 Pulse: (!) 101 99 Resp: 20 Temp: 97.8 °F (36.6 °C) SpO2: 96% 96% Weight: 63.5 kg (140 lb) Height: 5' 3\" (1.6 m) Physical Exam  
Constitutional: She is oriented to person, place, and time. She appears well-developed and well-nourished. No distress. HENT:  
Head: Normocephalic and atraumatic. Eyes: Conjunctivae and EOM are normal. Pupils are equal, round, and reactive to light. Neck: Normal range of motion. Neck supple. Cardiovascular: Normal rate, regular rhythm and normal heart sounds. Pulmonary/Chest: Effort normal and breath sounds normal. No respiratory distress. She has no wheezes. She has no rales. She exhibits tenderness. Mild right-sided chest wall pain with palpation. No evidence of bleeding or hematoma. No crepitus. Abdominal: Soft. She exhibits no distension. There is no tenderness. There is no rebound. Musculoskeletal: Normal range of motion. She exhibits no edema or tenderness. Neurological: She is alert and oriented to person, place, and time. Skin: Skin is warm and dry. No rash noted. She is not diaphoretic. Psychiatric: She has a normal mood and affect. Her behavior is normal.  
Vitals reviewed. MDM Number of Diagnoses or Management Options Acute hyperkalemia: new and requires workup Fall, initial encounter: new and requires workup Ventricular tachycardia [facility]): new and requires workup Diagnosis management comments: Patient had a brief run of possible ventricular tachycardia or A. Fib with RVR/wide-complex secondary to hyperkalemia. She improved with administration of calcium gluconate. Discussed case with nephrology as well as hospitalist.  Patient will be admitted. I ordered insulin/dextrose as well as bicarbonate. Patient admitted in stable as well as improved condition. Kanika Abreu MD; 9/7/2018 @2:12 PM Voice dictation software was used during the making of this note. This software is not perfect and grammatical and other typographical errors may be present. This note has not been proofread for errors. 
=================================================================== Amount and/or Complexity of Data Reviewed Clinical lab tests: reviewed and ordered (Results for orders placed or performed during the hospital encounter of 09/07/18 
-CBC WITH AUTOMATED DIFF Result                                            Value                         Ref Range WBC                                               12.5 (H)                      4.3 - 11.1 K/uL           
     RBC                                               4.62                          4.05 - 5.2 M/uL HGB                                               15.7 (H)                      11.7 - 15.4 g/dL HCT                                               46.6 (H)                      35.8 - 46.3 % MCV                                               100.9 (H)                     79.6 - 97.8 FL            
     MCH                                               34.0 (H)                      26.1 - 32.9 PG            
     MCHC                                              33.7                          31.4 - 35.0 g/dL RDW                                               17.4                          % PLATELET                                          219                           150 - 450 K/uL      MPV                                               10.7                          9.4 - 12.3 FL             
 ABSOLUTE NRBC                                     0.00                          0.0 - 0.2 K/uL            
     DF                                                AUTOMATED NEUTROPHILS                                       78                            43 - 78 % LYMPHOCYTES                                       15                            13 - 44 % MONOCYTES                                         7                             4.0 - 12.0 % EOSINOPHILS                                       0 (L)                         0.5 - 7.8 % BASOPHILS                                         0                             0.0 - 2.0 % IMMATURE GRANULOCYTES                             1                             0.0 - 5.0 %               
     ABS. NEUTROPHILS                                  9.8 (H)                       1.7 - 8.2 K/UL            
     ABS. LYMPHOCYTES                                  1.8                           0.5 - 4.6 K/UL            
     ABS. MONOCYTES                                    0.8                           0.1 - 1.3 K/UL            
     ABS. EOSINOPHILS                                  0.0                           0.0 - 0.8 K/UL            
     ABS. BASOPHILS                                    0.0                           0.0 - 0.2 K/UL            
     ABS. IMM. GRANS.                                  0.1                           0.0 - 0.5 K/UL            
-METABOLIC PANEL, COMPREHENSIVE Result                                            Value                         Ref Range Sodium                                            128 (L)                       136 - 145 mmol/L      Potassium                                         6.9 (HH)                      3.5 - 5.1 mmol/L          
 Chloride                                          88 (L)                        98 - 107 mmol/L           
     CO2                                               17 (L)                        21 - 32 mmol/L Anion gap                                         23 (H)                        7 - 16 mmol/L Glucose                                           115 (H)                       65 - 100 mg/dL BUN                                               112 (H)                       8 - 23 MG/DL Creatinine                                        18.10 (H)                     0.6 - 1.0 MG/DL           
     GFR est AA                                        2 (L)                         >60 ml/min/1.73m2 GFR est non-AA                                    2 (L)                         >60 ml/min/1.73m2 Calcium                                           7.8 (L)                       8.3 - 10.4 MG/DL Bilirubin, total                                  0.6                           0.2 - 1.1 MG/DL           
     ALT (SGPT)                                        27                            12 - 65 U/L               
     AST (SGOT)                                        14 (L)                        15 - 37 U/L Alk. phosphatase                                  76                            50 - 136 U/L Protein, total                                    8.7 (H)                       6.3 - 8.2 g/dL Albumin                                           4.1                           3.2 - 4.6 g/dL Globulin                                          4.6 (H)                       2.3 - 3.5 g/dL A-G Ratio                                         0.9 (L)                       1.2 - 3.5                 
-EKG, 12 LEAD, INITIAL Result                                            Value                         Ref Range Ventricular Rate                                  102                           BPM                       
     Atrial Rate                                       107                           BPM                       
     QRS Duration                                      146                           ms Q-T Interval                                      430                           ms                        
     QTC Calculation (Bezet)                           560                           ms                        
     Calculated R Axis                                 -34                           degrees Calculated T Axis                                 130                           degrees Diagnosis Atrial fibrillation with rapid ventricular response with premature  
 ventricular or aberrantly conducted complexes Left axis deviation Left bundle branch block Abnormal ECG When compared with ECG of 30-JUL-2018 10:50, Left bundle branch block has replaced Non-specific intra-ventricular  
 conduction delay T wave inversion no longer evident in Inferior leads T wave inversion more evident in Lateral leads ) Tests in the radiology section of CPT®: ordered and reviewed (Xr Chest Pa Lat Result Date: 9/7/2018 Chest 2 view dated 9/7/2018 Prior 7/31/2018 Confirmation: Chest pain Lungs are poorly expanded but clear. Vascularity normal. No pleural effusion or pneumothorax. Heart is enlarged, mediastinum unremarkable. IMPRESSION: No acute abnormality 
 
) Review and summarize past medical records: yes Discuss the patient with other providers: yes Independent visualization of images, tracings, or specimens: yes Risk of Complications, Morbidity, and/or Mortality Presenting problems: high Diagnostic procedures: high Management options: high Patient Progress Patient progress: improved ED Course Procedures

## 2018-09-07 NOTE — CONSULTS
Nephrology consult    Admission Date:  9/7/2018    Admission Diagnosis  Hyperkalemia  Metabolic acidosis  ESRD (end stage renal disease) on dialysis [facility])    We are asked by Dr. Lisbet Stacy    History of Present Illness: this is a 80year old female on dialysis MWF at [facility] unit. She has not gone all week because she has a colostomy since 1986 and she ran out of her bags. She was not able to order any until Tuesday and they just arrived yesterday. She presented to ER because she fell and hit her chest. In her evaluation she was found to have a potassium of 6.9. I am consulted for dialysis treatment. Past Medical History:   Diagnosis Date    Abdominal pain, chronic, right lower quadrant 5/16/2013    Anticoagulated on Coumadin     was told to hold for 5 days- held as 10/15/16    Aortic stenosis 06/26/2015    mild to moderate per echo     Arthritis     minimal    Atrial fibrillation, chronic ([address] 1/14/2016    CAD (coronary artery disease) 06/2013    mild MI     Carotid stenosis without Infarct 1/19/2016    Chronic anemia 6/26/2013    Chronic kidney disease     ESRD stage 4, M, W, F dialysis. [facility]  in  [facility]    Chronic renal failure 1/19/2016    Crohn's disease ([facility] [address]     colectomy    Dyslipidemia 6/26/2013    Edema 1/19/2016    ESRD (end stage renal disease) on dialysis ([facility] [address] 05/16/2013    right arm functioning- fistula----M-W-F AT [address] [facility] DIALYSIS    Former smoker     GERD (gastroesophageal reflux disease)     controlled on meds takes prilosec    Gout 6/26/2013    History of ileostomy 5/16/2013    History of peritonitis 2013    dialysis associated    History of sepsis 2009    Hypercholesteremia     Hyperlipidemia 1/19/2016    Hypertension      well controlled by medication    Hypokalemia 5/16/2013    Hypomagnesemia 6/23/2009    Ileostomy in place [facility])     Intractable nausea and vomiting 5/16/2013    Leukocytosis 5/16/2013    NSTEMI (non-ST elevated myocardial infarction) ([facility] [address] 6/26/2013    Palpitations 1/19/2016    Paroxysmal atrial fibrillation ([facility] [address] 1/19/2016    Paroxysmal tachycardia ([facility] [address] 1/19/2016    S/P AAA repair     \" aneurysm in my stomach repaired\"    Serum lipase elevation 5/16/2013    Tricuspid regurgitation EF 55-60%    moderate to severe per echo 6/26/15      Past Surgical History:   Procedure Laterality Date    ABDOMEN SURGERY PROC UNLISTED      insertion and removal of infected PD cath    COLONOSCOPY N/A 10/20/2016    ILEOSCOPY THROUGH OSTOMY/ 25 performed by Hodan Mckinnon MD at [address] [facility] HX AAA REPAIR  2013    HX BREAST BIOPSY Bilateral       x3 on left breast    HX CATARACT REMOVAL Bilateral 2005    wit IOL    HX COLECTOMY  1983    colon removed / ileostomy    HX LAP CHOLECYSTECTOMY  2013    VASCULAR SURGERY PROCEDURE UNLIST Left 2009    fistulagram 8/09, LUE    VASCULAR SURGERY PROCEDURE UNLIST      7/09 L subclavian hemodialysis cath    VASCULAR SURGERY PROCEDURE UNLIST Right 2014    fistula      Current Facility-Administered Medications   Medication Dose Route Frequency    NOREPINephrine (LEVOPHED) 4 mg/250 mL (16 mcg/mL) in NS infusion        calcium gluconate 1 g in 0.9% sodium chloride 100 mL IVPB  1 g IntraVENous ONCE     Current Outpatient Prescriptions   Medication Sig    dilTIAZem CD (CARDIZEM CD) 180 mg ER capsule Take 1 Cap by mouth daily.  metoprolol tartrate (LOPRESSOR) 50 mg tablet Take 1 Tab by mouth two (2) times a day.  esomeprazole (NEXIUM) 20 mg capsule Take  by mouth daily.  aspirin delayed-release 81 mg tablet Take  by mouth daily.  sevelamer carbonate (RENVELA) 800 mg tab tab Take 1,600 mg by mouth three (3) times daily (with meals). Indications: Renal Osteodystrophy with Hyperphosphatemia    zolpidem (AMBIEN) 10 mg tablet Take 5 mg by mouth nightly.  Indications: SLEEP-ONSET INSOMNIA     Allergies   Allergen Reactions    Adhesive Other (comments)     Skin tears        Social History   Substance Use Topics  Smoking status: Former Smoker     Packs/day: 0.25     Years: 5.00     Quit date: 8/22/1971    Smokeless tobacco: Never Used      Comment: quit years ago    Alcohol use No      Family History   Problem Relation Age of Onset    Cancer Mother     Stroke Father     Hypertension Father     Heart Disease Brother     Heart Disease Brother     Stroke Sister     Hypertension Sister         Review of Systems  Unobtainable, pt demented    Objective:     Vitals:    09/07/18 1606 09/07/18 1621 09/07/18 1635 09/07/18 1636   BP: 114/57 99/54  103/51   Pulse: 93 74 79 92   Resp: 17 20 14    Temp:       SpO2: 95% 95% 95% 94%   Weight:       Height:         No intake or output data in the 24 hours ending 09/07/18 1653    Physical Exam  GEN :in no distress, alert and confused  HEENT: anicteric sclerae, eomi. Oropharynx without lesions. Mucous membranes are moist.  Neck - supple without JVD, no thyromegaly. No lymphadenopathy. CV - regular rate and rhythm, no murmur, no rub  Lung - clear bilaterally, lungs expand symmetrically  Chest wall - normal appearance  Abd - soft, nontender, bowel sounds present, no hepatosplenomegaly  Ext - no clubbing, no cyanosis, no edema, RUE AVF good thrill  Neurologic - nonfocal  Genitourinary - bladder nonpalpable  Skin - no rashes, no purpura, no ecchymoses  Psychiatric: Normal mood and affect. Data Review:   Recent Labs      09/07/18   1229   WBC  12.5*   HGB  15.7*   HCT  46.6*   PLT  219     Recent Labs      09/07/18   1554  09/07/18   1229   NA   --   128*   K  7.2*  6.9*   CL   --   88*   CO2   --   17*   BUN   --   112*   CREA   --   18.10*   GLU   --   115*   CA   --   7.8*     No results for input(s): PH, PCO2, PO2, PCO2 in the last 72 hours.     Problem List:     Patient Active Problem List    Diagnosis Date Noted    Hyperkalemia 08/57/6885    Metabolic acidosis 96/74/8838    Pleural effusion, right 07/31/2018    Shortness of breath 07/30/2018    Pulmonary edema 07/30/2018    Edema 01/19/2016    Anemia 01/19/2016    Chronic renal failure 01/19/2016    Hypertension, benign 01/19/2016    Aortic stenosis 01/19/2016    Palpitations 01/19/2016    Carotid stenosis without Infarct 01/19/2016    CAD (coronary artery disease) 01/19/2016    Hyperlipidemia 01/19/2016    Paroxysmal tachycardia ([address] 01/19/2016    Paroxysmal atrial fibrillation ([address] 01/19/2016    Atrial fibrillation, chronic ([address] 01/14/2016    NSTEMI (non-ST elevated myocardial infarction) ([address] 06/26/2013    Dyslipidemia 06/26/2013    Gout 06/26/2013    Chronic anemia 06/26/2013    Peritonitis, dialysis-associated ([address] 05/17/2013    Symptomatic cholelithiasis 05/17/2013    Intractable nausea and vomiting 05/16/2013    Abdominal pain, chronic, right lower quadrant 05/16/2013    Leukocytosis 05/16/2013    ESRD (end stage renal disease) on dialysis ([facility] [address] 05/16/2013    Hypokalemia 05/16/2013    Serum lipase elevation 05/16/2013    History of ileostomy 05/16/2013    Crohn's disease ([facility] [address] 07/03/2009    Pleural effusion, left 07/02/2009    Hypomagnesemia 06/23/2009       Impression:    Plan:   1. ESRD HD [facility]  2. Hyperkalemia needs dialysis tonight  3. Hypotension normally runs higher than systolic BP 16\"F  4. Dementia  Overall pt is to go to dialysis tonight and in am with minimum ultrafiltration. Thank you for allowing me to evaluate this pt.

## 2018-09-07 NOTE — Clinical Note
Status[de-identified] Inpatient [101] Type of Bed: Intensive Care [6] Inpatient Hospitalization Certified Necessary for the Following Reasons: 3. Patient receiving treatment that can only be provided in an inpatient setting (further clarification in H&P documentation) Admitting Diagnosis: Hyperkalemia [861849] Admitting Diagnosis: Metabolic acidosis [365145] Admitting Diagnosis: ESRD (end stage renal disease) on dialysis [facility] [[id number]] Admitting Physician: Drew Hitchcock Attending Physician: Drew Hitchcock Estimated Length of Stay: 3-4 Midnights Discharge Plan[de-identified] Home with Office Follow-up

## 2018-09-07 NOTE — ED TRIAGE NOTES
Pt states she fell yesterday hurting her chest. Pt has missed dialysis all week and feels nauseated.

## 2018-09-07 NOTE — H&P
Hospitalist H&P Note Admit Date:  2018  1:02 PM  
Name:  Garland Mcneal Age:  80 y.o. 
:  3/14/1933 MRN:  [id number] PCP:  None Treatment Team: Attending Provider: Doni Rock MD; Consulting Provider: Gray Astorga MD 
 
HPI:  
 
The patient is an 81 y/o lady with ESRD on HD, Paroxysmal A Fib, CAD, Aortic Stenosis, Dyslipidemia, GERD, Gout, HTN, Crohn's disease s/p colectomy and ileostomy, presented to the ED at [facility] after a fall. The patient actually fell while getting out of the bathtub. She also reports some right chest wall pain, but also some nausea, fatigue, anorexia and generalized weakness. Of note, her last hemodialysis session was 1 week ago. Labs obtained in the ED were significant for BUN of 112, Cr 18.1, K 6.9, CO2 17, Na 128, AG 23. Nephrology was consulted and advised admission to the ICU for HD at the bedside. The Hospitalist service was then contacted and the patient was admitted for Hyperkalemia and High Anion Gap Metabolic Acidosis in the context of ESRD on HD. 
 
ROS: 10 systems reviewed and negative except as noted in HPI. Past Medical History:  
Diagnosis Date  Abdominal pain, chronic, right lower quadrant 2013  Anticoagulated on Coumadin   
 was told to hold for 5 days- held as 10/15/16  Aortic stenosis 2015  
 mild to moderate per echo  Arthritis   
 minimal  
 Atrial fibrillation, chronic (HCC) 2016  CAD (coronary artery disease) 2013  
 mild MI   
 Carotid stenosis without Infarct 2016  Chronic anemia 2013  Chronic kidney disease ESRD stage 4, M, W, F dialysis. Ryan Walker in  [facility]  Chronic renal failure 2016  Crohn's disease ([facility] [address]   
 colectomy  Dyslipidemia 2013  Edema 2016  ESRD (end stage renal disease) on dialysis ([facility] [address] 2013  
 right arm functioning- fistula----M-W-F AT [facility]  
 Former smoker  GERD (gastroesophageal reflux disease) controlled on meds takes prilosec  Gout 6/26/2013  History of ileostomy 5/16/2013  History of peritonitis 2013  
 dialysis associated  History of sepsis 2009  Hypercholesteremia  Hyperlipidemia 1/19/2016  Hypertension   
  well controlled by medication  Hypokalemia 5/16/2013  Hypomagnesemia 6/23/2009  Ileostomy in place [facility])  Intractable nausea and vomiting 5/16/2013  Leukocytosis 5/16/2013  
 NSTEMI (non-ST elevated myocardial infarction) ([address] 6/26/2013  Palpitations 1/19/2016  Paroxysmal atrial fibrillation ([address] 1/19/2016  Paroxysmal tachycardia ([address] 1/19/2016  S/P AAA repair \" aneurysm in my stomach repaired\"  Serum lipase elevation 5/16/2013  Tricuspid regurgitation EF 55-60%  
 moderate to severe per echo 6/26/15 Past Surgical History:  
Procedure Laterality Date  ABDOMEN SURGERY PROC UNLISTED    
 insertion and removal of infected PD cath  COLONOSCOPY N/A 10/20/2016 ILEOSCOPY THROUGH OSTOMY/ 25 performed by Chantelle Alford MD at [facility] HX AAA REPAIR  2013  HX BREAST BIOPSY Bilateral    
  x3 on left breast  
 HX CATARACT REMOVAL Bilateral 2005  
 wit IOL  HX COLECTOMY  1983  
 colon removed / ileostomy  HX LAP CHOLECYSTECTOMY  2013  VASCULAR SURGERY PROCEDURE UNLIST Left 2009  
 fistulagram 8/09, LUE  VASCULAR SURGERY PROCEDURE UNLIST    
 7/09 L subclavian hemodialysis cath  VASCULAR SURGERY PROCEDURE UNLIST Right 2014  
 fistula Allergies Allergen Reactions  Adhesive Other (comments) Skin tears Social History Substance Use Topics  Smoking status: Former Smoker Packs/day: 0.25 Years: 5.00 Quit date: 8/22/1971  Smokeless tobacco: Never Used Comment: quit years ago  Alcohol use No  
  
Family History Problem Relation Age of Onset  Cancer Mother  Stroke Father  Hypertension Father  Heart Disease Brother  Heart Disease Brother  Stroke Sister  Hypertension Sister There is no immunization history for the selected administration types on file for this patient. PTA Medications: 
Prior to Admission Medications Prescriptions Last Dose Informant Patient Reported? Taking?  
aspirin delayed-release 81 mg tablet   Yes No  
Sig: Take  by mouth daily. dilTIAZem CD (CARDIZEM CD) 180 mg ER capsule   No No  
Sig: Take 1 Cap by mouth daily. esomeprazole (NEXIUM) 20 mg capsule   Yes No  
Sig: Take  by mouth daily. metoprolol tartrate (LOPRESSOR) 50 mg tablet   No No  
Sig: Take 1 Tab by mouth two (2) times a day. sevelamer carbonate (RENVELA) 800 mg tab tab   Yes No  
Sig: Take 1,600 mg by mouth three (3) times daily (with meals). Indications: Renal Osteodystrophy with Hyperphosphatemia  
zolpidem (AMBIEN) 10 mg tablet   Yes No  
Sig: Take 5 mg by mouth nightly. Indications: SLEEP-ONSET INSOMNIA Facility-Administered Medications: None Objective:  
Patient Vitals for the past 24 hrs: 
 Temp Pulse Resp BP SpO2  
09/07/18 1935 - (!) 126 - 110/68 -  
09/07/18 1926 - (!) 106 - 130/62 -  
09/07/18 1859 - (!) 117 - 100/52 -  
09/07/18 1829 - 91 - 102/60 -  
09/07/18 1759 - (!) 114 - (!) 86/54 -  
09/07/18 1748 - (!) 120 - 95/56 -  
09/07/18 1747 - (!) 114 - (!) 80/50 -  
09/07/18 1740 - 88 - 102/57 -  
09/07/18 1730 - 84 20 93/51 -  
09/07/18 1704 95.9 °F (35.5 °C) 87 - 99/54 -  
09/07/18 1636 - 92 - 103/51 94 % 09/07/18 1635 - 79 14 - 95 % 09/07/18 1621 - 74 20 99/54 95 % 09/07/18 1606 - 93 17 114/57 95 % 09/07/18 1551 - 78 19 96/52 94 % 09/07/18 1531 - 82 15 - 94 % 09/07/18 1521 - - - 98/56 95 % 09/07/18 1506 - 97 21 (!) 83/53 97 % 09/07/18 1452 - 97 15 115/55 95 % 09/07/18 1421 - 90 25 100/59 96 % 09/07/18 1406 - 88 25 90/52 95 % 09/07/18 1351 - - - 97/50 96 % 09/07/18 1335 - 98 27 115/59 96 % 09/07/18 1309 - 99 - 112/59 96 % 09/07/18 1218 97.8 °F (36.6 °C) (!) 101 20 90/58 96 % Oxygen Therapy O2 Sat (%): 94 % (09/07/18 1636) Pulse via Oximetry: 90 beats per minute (09/07/18 1636) O2 Device: Room air (09/07/18 1218) Intake/Output Summary (Last 24 hours) at 09/07/18 1951 Last data filed at 09/07/18 [place] Gross per 24 hour Intake                0 ml Output                0 ml Net                0 ml Physical Exam: 
General:    Well nourished. Alert. Eyes:   Normal sclera. Extraocular movements intact. ENT:  Normocephalic, atraumatic. Dry mucous membranes CV:   Normal S1S2, irregularly irregular. No murmur, rub, or gallop. Lungs:  CTAB. No wheezing, rhonchi, or rales. Abdomen: Soft, nontender, nondistended. Bowel sounds normal.  
Extremities: Warm and dry. No cyanosis or edema. Neurologic: CN II-XII grossly intact. Sensation intact. Skin:     No rashes or jaundice. Psych:  Normal mood and affect. I reviewed the labs, imaging, EKGs, telemetry, and other studies done this admission. Data Review:  
Recent Results (from the past 24 hour(s)) EKG, 12 LEAD, INITIAL Collection Time: 09/07/18 12:20 PM  
Result Value Ref Range Ventricular Rate 102 BPM  
 Atrial Rate 107 BPM  
 QRS Duration 146 ms  
 Q-T Interval 430 ms QTC Calculation (Bezet) 560 ms Calculated R Axis -34 degrees Calculated T Axis 130 degrees Diagnosis Atrial fibrillation with rapid ventricular response with premature  
ventricular or aberrantly conducted complexes Left axis deviation Incomplete left bundle branch block Abnormal ECG When compared with ECG of 30-JUL-2018 10:50, 
T wave inversion no longer evident in Inferior leads T wave inversion more evident in Lateral leads Confirmed by CATRACHITO QURESHI (), Manish Wasserman ([id number]) on 9/7/2018 5:02:10 PM 
  
CBC WITH AUTOMATED DIFF Collection Time: 09/07/18 12:29 PM  
Result Value Ref Range WBC 12.5 (H) 4.3 - 11.1 K/uL  
 RBC 4.62 4.05 - 5.2 M/uL  
 HGB 15.7 (H) 11.7 - 15.4 g/dL HCT 46.6 (H) 35.8 - 46.3 % .9 (H) 79.6 - 97.8 FL  
 MCH 34.0 (H) 26.1 - 32.9 PG  
 MCHC 33.7 31.4 - 35.0 g/dL  
 RDW 17.4 % PLATELET 364 070 - 081 K/uL MPV 10.7 9.4 - 12.3 FL ABSOLUTE NRBC 0.00 0.0 - 0.2 K/uL  
 DF AUTOMATED NEUTROPHILS 78 43 - 78 % LYMPHOCYTES 15 13 - 44 % MONOCYTES 7 4.0 - 12.0 % EOSINOPHILS 0 (L) 0.5 - 7.8 % BASOPHILS 0 0.0 - 2.0 % IMMATURE GRANULOCYTES 1 0.0 - 5.0 %  
 ABS. NEUTROPHILS 9.8 (H) 1.7 - 8.2 K/UL  
 ABS. LYMPHOCYTES 1.8 0.5 - 4.6 K/UL  
 ABS. MONOCYTES 0.8 0.1 - 1.3 K/UL  
 ABS. EOSINOPHILS 0.0 0.0 - 0.8 K/UL  
 ABS. BASOPHILS 0.0 0.0 - 0.2 K/UL  
 ABS. IMM. GRANS. 0.1 0.0 - 0.5 K/UL METABOLIC PANEL, COMPREHENSIVE Collection Time: 09/07/18 12:29 PM  
Result Value Ref Range Sodium 128 (L) 136 - 145 mmol/L Potassium 6.9 (HH) 3.5 - 5.1 mmol/L Chloride 88 (L) 98 - 107 mmol/L  
 CO2 17 (L) 21 - 32 mmol/L Anion gap 23 (H) 7 - 16 mmol/L Glucose 115 (H) 65 - 100 mg/dL  (H) 8 - 23 MG/DL Creatinine 18.10 (H) 0.6 - 1.0 MG/DL  
 GFR est AA 2 (L) >60 ml/min/1.73m2 GFR est non-AA 2 (L) >60 ml/min/1.73m2 Calcium 7.8 (L) 8.3 - 10.4 MG/DL Bilirubin, total 0.6 0.2 - 1.1 MG/DL  
 ALT (SGPT) 27 12 - 65 U/L  
 AST (SGOT) 14 (L) 15 - 37 U/L Alk. phosphatase 76 50 - 136 U/L Protein, total 8.7 (H) 6.3 - 8.2 g/dL Albumin 4.1 3.2 - 4.6 g/dL Globulin 4.6 (H) 2.3 - 3.5 g/dL A-G Ratio 0.9 (L) 1.2 - 3.5 PROTHROMBIN TIME + INR Collection Time: 09/07/18  2:12 PM  
Result Value Ref Range Prothrombin time 15.2 (H) 11.5 - 14.5 sec INR 1.3 POTASSIUM Collection Time: 09/07/18  3:54 PM  
Result Value Ref Range Potassium 7.2 (HH) 3.5 - 5.1 mmol/L  
POTASSIUM Collection Time: 09/07/18  6:41 PM  
Result Value Ref Range Potassium 3.4 (L) 3.5 - 5.1 mmol/L All Micro Results None Other Studies: Xr Chest Pa Lat Result Date: 9/7/2018 Chest 2 view dated 9/7/2018 Prior 7/31/2018 Confirmation: Chest pain Lungs are poorly expanded but clear. Vascularity normal. No pleural effusion or pneumothorax. Heart is enlarged, mediastinum unremarkable. IMPRESSION: No acute abnormality Assessment and Plan:  
 
Hospital Problems as of 9/7/2018  Date Reviewed: 7/31/2018 Codes Class Noted - Resolved POA Hyperkalemia ICD-10-CM: E87.5 ICD-9-CM: 276.7  9/7/2018 - Present Unknown Metabolic acidosis L-91-AA: E87.2 ICD-9-CM: 276.2  9/7/2018 - Present Unknown ESRD (end stage renal disease) ([facility] ICD-10-CM: N18.6 ICD-9-CM: 585.6  9/7/2018 - Present Unknown ESRD (end stage renal disease) on dialysis ([facility] ICD-10-CM: N18.6, Z99.2 ICD-9-CM: 585.6, V45.11  5/16/2013 - Present Unknown 1 - Hyperkalemia 2 - High Anion Gap Metabolic Acidosis 3 - ESRD on HD with several missed HD sessions 4 - CAD 
5 - Paroxysmal A Fib 6 - HTN 
7 - Dyslipidemia 8 - Crohn's disease s/p colectomy and ileostomy 9 - GERD 
 
PLAN: 
· Admit to ICU to facilitate bedside hemodialysis, as the patient presented with hyperkalemia and metabolic acidosis · Nephrology consultation · Reiterate compliance with hemodialysis · Recheck all electrolytes in the morning · Resume home medications for chronic conditions · If the patient's clinical condition improves after HD, she can be transferred to the Medical Floor with remote Telemetry DVT ppx: with Heparin SQ Code status:  Full code Estimated LOS:  Greater than 2 midnights Risk:  high Signed: Marquise Edwards MD

## 2018-09-07 NOTE — DIALYSIS
TRANSFER OUT -DIALYSIS Hemodialysis treatment completed without complications. Patient alert and VS stable  /64  P 116   
 
 0 Kgs removed. Needles X2 removed from access and manual pressure held until hemostasis complete and pressure dressing applied. Patient to 837 after dialysis.

## 2018-09-07 NOTE — IP AVS SNAPSHOT
Summary of Care Report The Summary of Care report has been created to help improve care coordination. Users with access to [employer] or [employer] [facility] (Web-based application) may access additional patient information including the Discharge Summary. If you are not currently a [address] user and need more information, please call the number listed below in the [place] [address] section and ask to be connected with Medical Records. Facility Information Name Address Phone [address] [address] [address] [phone] Patient Information Patient Name Sex BURAK Borjas ([id number]) Female 3/14/1933 Discharge Information Admitting Provider Service Area Unit Jena Robles MD / [id number] Med Surg / [phone] Discharge Provider Discharge Date/Time Discharge Disposition Destination (none) 9/10/2018 Afternoon (Pending) [facility] (none) Patient Language Language ENGLISH [13] Hospital Problems as of 9/10/2018  Reviewed: 2018  2:14 PM by Sally Suárez MD  
  
  
  
 Class Noted - Resolved Last Modified POA Active Problems ESRD (end stage renal disease) on dialysis ([facility] [facility]  2013 - Present 2018 by Sally Suárez MD Yes Entered by Peter Kunz Atrial fibrillation, chronic ([facility] [address]  2016 - Present 2018 by Sally Suárez MD Yes Entered by Jef Nicole Current Assessment & Plan 2018 Hospital Encounter Edited 2018  2:15 PM by Sally Suárez MD  
   On asa Hyperkalemia  2018 - Present 2018 by Sally Suárez MD Yes Entered by Jena Robles MD  
  Metabolic acidosis  3/6/2852 - Present 2018 by Sally Suárez MD Yes   Entered by Jena Robles MD  
  ESRD (end stage renal disease) ([facility] [address]  2018 - Present 2018 by Brett Nye MD Yes Entered by Esperanza Devi MD  
  Chest wall contusion, right, sequela  9/8/2018 - Present 9/8/2018 by Brett Nye MD Yes Entered by Brett Nye MD  
  Hemodialysis-associated hypotension  9/8/2018 - Present 9/8/2018 by Brett Nye MD No  
  Entered by Brett Nye MD  
  
Non-Hospital Problems as of 9/10/2018  Reviewed: 9/8/2018  2:14 PM by Brett Nye MD  
  
  
  
 Class Noted - Resolved Last Modified Active Problems Hypomagnesemia  6/23/2009 - Present 5/20/2013 Entered by Eulogio Hernandez Pleural effusion, left  7/2/2009 - Present 7/31/2018 by Guadalupe Clark MD  
  Entered by Delphine Dyson Crohn's disease ([facility]  7/3/2009 - Present 5/20/2013 Entered by Susan Woods MD  
  Intractable nausea and vomiting  5/16/2013 - Present 5/20/2013 Entered by Zaida Gray Abdominal pain, chronic, right lower quadrant (Chronic)  5/16/2013 - Present 5/20/2013 Entered by Zaida Gray Leukocytosis  5/16/2013 - Present 5/20/2013 Entered by Zaida Gray Hypokalemia  5/16/2013 - Present 5/20/2013 Entered by Zaida Gray Serum lipase elevation  5/16/2013 - Present 5/20/2013 Entered by Zaida Gray History of ileostomy (Chronic)  5/16/2013 - Present 7/30/2018 by Ren Olivo MD  
  Entered by Zaida Gray Peritonitis, dialysis-associated ([facility] [address]  5/17/2013 - Present 5/20/2013 Entered by Coco Edward Symptomatic cholelithiasis  5/17/2013 - Present 5/20/2013 Entered by Coco Edward NSTEMI (non-ST elevated myocardial infarction) ([facility] [facility]  6/26/2013 - Present 6/28/2013 Entered by NE Marin Dyslipidemia (Chronic)  6/26/2013 - Present 6/28/2013 Entered by NE Marin Gout (Chronic)  6/26/2013 - Present 6/28/2013 Entered by NE Marin   Chronic anemia (Chronic)  6/26/2013 - Present 7/30/2018 by Maksim Dinh Jeffrey Tan MD  
  Entered by NE Garvey Edema  1/19/2016 - Present 1/19/2016 by Garrison Haney Entered by Garrison Haney Anemia  1/19/2016 - Present 1/19/2016 by Garrison Haney Entered by Garrison Haney Chronic renal failure  1/19/2016 - Present 1/19/2016 by Garrison Haney Entered by Garrison Haney Hypertension, benign  1/19/2016 - Present 7/30/2018 by Percy Shaffer MD  
  Entered by Garrison Haney Aortic stenosis  1/19/2016 - Present 1/19/2016 by Garrison Haney Entered by Garrison Haney Palpitations  1/19/2016 - Present 1/19/2016 by Garrison Haney Entered by Garrison Haney Carotid stenosis without Infarct  1/19/2016 - Present 1/19/2016 by Garrison Haney Entered by Bevelyn Washington CAD (coronary artery disease)  1/19/2016 - Present 7/30/2018 by Percy Shaffer MD  
  Entered by Garrison Haney Overview Addendum 1/19/2016  1:33 PM by Garrison Haney ASCAD - 50% LAD and RCA by cath 2 years ago Hyperlipidemia  1/19/2016 - Present 1/19/2016 by Garrison Haney Entered by Garrison Haney Paroxysmal tachycardia ([address]  1/19/2016 - Present 1/19/2016 by Garrison Haney Entered by Garrison Haney Paroxysmal atrial fibrillation ([address]  1/19/2016 - Present 7/30/2018 by Percy Shaffer MD  
  Entered by Garrison Haney Shortness of breath  7/30/2018 - Present 7/30/2018 by Percy Shaffer MD  
  Entered by Percy Shaffer MD  
  Pulmonary edema  7/30/2018 - Present 7/30/2018 by Percy Shaffer MD  
  Entered by Percy Shaffer MD  
  Pleural effusion, right  7/31/2018 - Present 7/31/2018 by Elie King MD  
  Entered by Elie King MD  
  
You are allergic to the following Allergen Reactions Adhesive Other (comments) Skin tears Current Discharge Medication List  
  
START taking these medications Dose & Instructions Dispensing Information Comments  
 acetaminophen 325 mg tablet Commonly known as:  TYLENOL  Dose:  325 mg  
 Take 1 Tab by mouth every six (6) hours as needed. Quantity:  40 Tab Refills:  0  
   
 dilTIAZem 60 mg tablet Commonly known as:  CARDIZEM Replaces:  dilTIAZem  mg ER capsule Dose:  60 mg Take 1 Tab by mouth Before breakfast, lunch, and dinner. Quantity:  90 Tab Refills:  0 CONTINUE these medications which have CHANGED Dose & Instructions Dispensing Information Comments  
 metoprolol tartrate 50 mg tablet Commonly known as:  LOPRESSOR What changed:  how much to take Dose:  25 mg Take 0.5 Tabs by mouth two (2) times a day. Quantity:  60 Tab Refills:  1 CONTINUE these medications which have NOT CHANGED Dose & Instructions Dispensing Information Comments AMBIEN 10 mg tablet Generic drug:  zolpidem Dose:  5 mg Take 5 mg by mouth nightly. Indications: SLEEP-ONSET INSOMNIA Refills:  0  
   
 aspirin delayed-release 81 mg tablet Take  by mouth daily. Refills:  0  
   
 esomeprazole 20 mg capsule Commonly known as:  Loletta Luis M Take  by mouth daily. Refills:  0  
   
 omeprazole 20 mg capsule Commonly known as:  PRILOSEC Dose:  20 mg Take 20 mg by mouth two (2) times a day. Refills:  0  
   
 RENVELA 800 mg Tab tab Generic drug:  sevelamer carbonate Dose:  1600 mg Take 1,600 mg by mouth three (3) times daily (with meals). Indications: Renal Osteodystrophy with Hyperphosphatemia Refills:  0 STOP taking these medications Comments  
 dilTIAZem  mg ER capsule Commonly known as:  CARDIZEM CD Replaced by:  dilTIAZem 60 mg tablet Follow-up Information Follow up With Details Comments Contact Info Follow up at normal scheduled dialysis appointment Discharge Instructions Incentive spirometry 10 times an hour while awake Hyperkalemia: Care Instructions Your Care Instructions Hyperkalemia is too much potassium in the blood. Potassium helps keep the right mix of fluids in your body. It also helps your nerves and muscles work as they should. And it keeps your heartbeat in a normal rhythm. Some things can raise potassium levels. These include some health problems, medicines, and kidney problems. (Normally, your kidneys remove extra potassium.) Too much potassium can cause nausea. It also can cause a heartbeat that isn't normal. But you may not have any symptoms. Too much potassium can be dangerous. That's why it's important to treat it. If you are taking any of the medicines that can raise your levels, your doctor will ask you to stop. You may get medicines to lower your levels. And you may have to limit or not eat foods that have a lot of potassium. Follow-up care is a key part of your treatment and safety. Be sure to make and go to all appointments, and call your doctor if you are having problems. It's also a good idea to know your test results and keep a list of the medicines you take. How can you care for yourself at home? · Take your medicines exactly as prescribed. Call your doctor if you think you are having a problem with your medicine. · Stop taking certain medicines if your doctor asks you to. They may be causing your high potassium levels. If you have concerns about stopping medicine, talk with your doctor. · If you have kidney, heart, or liver disease and have to limit fluids, talk with your doctor before you increase the amount of fluids you drink. If the doctor says it's okay, drink plenty of fluids. This means drinking enough so that your urine is light yellow or clear like water. · Avoid strenuous exercise until your doctor tells you it is okay. · Potassium is in many foods, including vegetables, fruits, and milk products. Foods high in potassium include bananas, cantaloupe, broccoli, milk, potatoes, and tomatoes. · Low potassium foods include blueberries, raspberries, cucumber, white or brown rice, spaghetti, and macaroni. · Do not use a salt substitute without talking to your doctor first. Most of these are very high in potassium. · Be sure to tell your doctor about any prescription, over-the-counter, or herbal medicines you take. Some of these can raise potassium. When should you call for help? Call 911 anytime you think you may need emergency care. For example, call if: 
  · You passed out (lost consciousness).  
  · You have an unusual heartbeat. Your heart may beat fast or skip beats.  
 Call your doctor now or seek immediate medical care if: 
  · You have muscle aches.  
  · You feel very weak.  
 Watch closely for changes in your health, and be sure to contact your doctor if: 
  · You do not get better as expected. Where can you learn more? Go to http://mello-amber.info/. Enter W896 in the search box to learn more about \"Hyperkalemia: Care Instructions. \" Current as of: May 12, 2017 Content Version: 11.7 © 7931-8982 [employer]. Care instructions adapted under license by [employer] (which disclaims liability or warranty for this information). If you have questions about a medical condition or this instruction, always ask your healthcare professional. [address] any warranty or liability for your use of this information. DISCHARGE SUMMARY from Nurse PATIENT INSTRUCTIONS: 
 
After general anesthesia or intravenous sedation, for 24 hours or while taking prescription Narcotics: · Limit your activities · Do not drive and operate hazardous machinery · Do not make important personal or business decisions · Do  not drink alcoholic beverages · If you have not urinated within 8 hours after discharge, please contact your surgeon on call. Report the following to your surgeon: · Excessive pain, swelling, redness or odor of or around the surgical area · Temperature over 100.5 · Nausea and vomiting lasting longer than 4 hours or if unable to take medications · Any signs of decreased circulation or nerve impairment to extremity: change in color, persistent  numbness, tingling, coldness or increase pain · Any questions What to do at Home: *  Please give a list of your current medications to your Primary Care Provider. *  Please update this list whenever your medications are discontinued, doses are 
    changed, or new medications (including over-the-counter products) are added. *  Please carry medication information at all times in case of emergency situations. These are general instructions for a healthy lifestyle: No smoking/ No tobacco products/ Avoid exposure to second hand smoke Surgeon General's Warning:  Quitting smoking now greatly reduces serious risk to your health. Obesity, smoking, and sedentary lifestyle greatly increases your risk for illness A healthy diet, regular physical exercise & weight monitoring are important for maintaining a healthy lifestyle You may be retaining fluid if you have a history of heart failure or if you experience any of the following symptoms:  Weight gain of 3 pounds or more overnight or 5 pounds in a week, increased swelling in our hands or feet or shortness of breath while lying flat in bed. Please call your doctor as soon as you notice any of these symptoms; do not wait until your next office visit. Recognize signs and symptoms of STROKE: 
 
F-face looks uneven A-arms unable to move or move unevenly S-speech slurred or non-existent T-time-call 911 as soon as signs and symptoms begin-DO NOT go Back to bed or wait to see if you get better-TIME IS BRAIN. Warning Signs of HEART ATTACK Call 911 if you have these symptoms: 
? Chest discomfort.  Most heart attacks involve discomfort in the center of the chest that lasts more than a few minutes, or that goes away and comes back. It can feel like uncomfortable pressure, squeezing, fullness, or pain. ? Discomfort in other areas of the upper body. Symptoms can include pain or discomfort in one or both arms, the back, neck, jaw, or stomach. ? Shortness of breath with or without chest discomfort. ? Other signs may include breaking out in a cold sweat, nausea, or lightheadedness. Don't wait more than five minutes to call [address]! Fast action can save your life. Calling 911 is almost always the fastest way to get lifesaving treatment. Emergency Medical Services staff can begin treatment when they arrive  up to an hour sooner than if someone gets to the hospital by car. The discharge information has been reviewed with the patient. The patient verbalized understanding. Discharge medications reviewed with the patient and appropriate educational materials and side effects teaching were provided. ___________________________________________________________________________________________________________________________________ Chart Review Routing History Recipient Method Report Sent By Yenny Liu MD [[id number]] 2/3/2012  8:00 AM 02/03/2012 Claudetta Augusta, MD  
Fax: [phone] Phone: [phone] Fax IP Auto Routed Francy Hinkle MD [[id number]] 5/19/2013  7:49 AM 05/19/2013 Nimisha Galicia MD  
Fax: [phone] Phone: [phone] Fax Notes/Transcriptions Callie Crook Coral 5/21/2013  5:24 PM 05/16/2013 Tori Hermosillo MD  
Fax: [phone] Phone: [phone] Fax IP Auto Routed Nola Meyer MD [[id number]] 12/18/2016  4:31 PM 12/18/2016 Claudetta Augusta, MD  
Fax: [phone] Phone: [phone] Fax [address] [address] CUSTOM LAB REPORT Ju Ochoa [[id number]] 1/26/2017 11:27 AM 1/26/2017

## 2018-09-07 NOTE — PROGRESS NOTES
TRANSFER - IN REPORT: 
 
Verbal report received from Lucita San RN on Zainab Garner  being received from Dialysis for routine progression of care Report consisted of patients Situation, Background, Assessment and  
Recommendations(SBAR). Information from the following report(s) SBAR was reviewed with the receiving nurse. Opportunity for questions and clarification was provided. Assessment completed upon patients arrival to unit and care assumed.

## 2018-09-07 NOTE — DIALYSIS
TRANSFER IN - DIALYSIS Received patient in dialysis unit from ED (unit) for ordered procedure. Consent verified for renal replacement therapy. Patient alert and vital signs stable. BP97/43 P80  Pt on heart monitor. Hemodialysis initiated using right upper arm AVF and 15 g needles. Machine settings per MD order. Will monitor during treatment.

## 2018-09-07 NOTE — PROGRESS NOTES
Critical Care Outreach Nurse Progress Report: 
 
Subjective: In to assess pt per HD RN request.  
MEWS Score: 3 (09/07/18 1218) Vitals:  
 09/07/18 1730 09/07/18 1740 09/07/18 1747 09/07/18 1748 BP: 93/51 102/57 (!) 80/50 95/56 Pulse: 84 88 (!) 114 (!) 120 Resp: 20 Temp:      
SpO2:      
Weight:      
Height:      
  
 
Objective: Pt sitting up in bed in dialysis watching television. Pain Intensity 1: 4 (09/07/18 1218) Pain Location 1: Chest 
  
Patient Stated Pain Goal: 0 Assessment: Pt is pleasant, denies pain, and is oriented. Pt currently running HD. BP 29'V systolic. O2 sats 97% on room air. 's afib. Pt has known afib. No acute distress noted. Plan: Will discuss HR/Rhythm with MD and a recheck K. No acute distress noted. Will continue to monitor.

## 2018-09-07 NOTE — IP AVS SNAPSHOT
[address] 
[phone] Patient: Ernesto Bobo MRN: [id number] YRW:[id number] About your hospitalization You were admitted on:  September 7, 2018 You last received care in the:  [facility] 8 MED SURG You were discharged on:  September 10, 2018 Why you were hospitalized Your primary diagnosis was:  Not on File Your diagnoses also included:  Esrd (End Stage Renal Disease) On Dialysis (Hcc), Hyperkalemia, Metabolic Acidosis, Esrd (End Stage Renal Disease) (Hcc), Chest Wall Contusion, Right, Sequela, Hemodialysis-Associated Hypotension, Atrial Fibrillation, Chronic (Hcc) Follow-up Information Follow up With Details Comments Contact Info Follow up at normal scheduled dialysis appointment Your Scheduled Appointments Tuesday September 18, 2018 11:00 AM EDT New Patient with Carmita Read NP  
[address] [facility] [address]  
[phone] Discharge Orders None A check sheyla indicates which time of day the medication should be taken. My Medications START taking these medications Instructions Each Dose to Equal  
 Morning Noon Evening Bedtime  
 acetaminophen 325 mg tablet Commonly known as:  TYLENOL Take 1 Tab by mouth every six (6) hours as needed. 325 mg  
    
   
   
   
  
 dilTIAZem 60 mg tablet Commonly known as:  CARDIZEM Replaces:  dilTIAZem  mg ER capsule Take 1 Tab by mouth Before breakfast, lunch, and dinner. 60 mg CHANGE how you take these medications Instructions Each Dose to Equal  
 Morning Noon Evening Bedtime  
 metoprolol tartrate 50 mg tablet Commonly known as:  LOPRESSOR What changed:  how much to take Take 0.5 Tabs by mouth two (2) times a day.   
 25 mg  
    
  
   
 CONTINUE taking these medications Instructions Each Dose to Equal  
 Morning Noon Evening Bedtime AMBIEN 10 mg tablet Generic drug:  zolpidem Take 5 mg by mouth nightly. Indications: SLEEP-ONSET INSOMNIA  
 5 mg  
    
   
   
   
  
  
 aspirin delayed-release 81 mg tablet Take  by mouth daily. esomeprazole 20 mg capsule Commonly known as:  Bethene Piper Take  by mouth daily. omeprazole 20 mg capsule Commonly known as:  PRILOSEC Take 20 mg by mouth two (2) times a day. 20 mg  
    
  
   
   
  
   
  
 RENVELA 800 mg Tab tab Generic drug:  sevelamer carbonate Take 1,600 mg by mouth three (3) times daily (with meals). Indications: Renal Osteodystrophy with Hyperphosphatemia  
 1600 mg  
    
  
   
  
   
  
   
  
  
STOP taking these medications   
 dilTIAZem  mg ER capsule Commonly known as:  CARDIZEM CD Replaced by:  dilTIAZem 60 mg tablet Where to Get Your Medications These medications were sent to [address] [place] [address] [address] [facility] [address] Phone:  [phone]  
  acetaminophen 325 mg tablet  
 dilTIAZem 60 mg tablet  
 metoprolol tartrate 50 mg tablet Discharge Instructions Incentive spirometry 10 times an hour while awake Hyperkalemia: Care Instructions Your Care Instructions Hyperkalemia is too much potassium in the blood. Potassium helps keep the right mix of fluids in your body. It also helps your nerves and muscles work as they should. And it keeps your heartbeat in a normal rhythm. Some things can raise potassium levels. These include some health problems, medicines, and kidney problems. (Normally, your kidneys remove extra potassium.) Too much potassium can cause nausea.  It also can cause a heartbeat that isn't normal. But you may not have any symptoms. Too much potassium can be dangerous. That's why it's important to treat it. If you are taking any of the medicines that can raise your levels, your doctor will ask you to stop. You may get medicines to lower your levels. And you may have to limit or not eat foods that have a lot of potassium. Follow-up care is a key part of your treatment and safety. Be sure to make and go to all appointments, and call your doctor if you are having problems. It's also a good idea to know your test results and keep a list of the medicines you take. How can you care for yourself at home? · Take your medicines exactly as prescribed. Call your doctor if you think you are having a problem with your medicine. · Stop taking certain medicines if your doctor asks you to. They may be causing your high potassium levels. If you have concerns about stopping medicine, talk with your doctor. · If you have kidney, heart, or liver disease and have to limit fluids, talk with your doctor before you increase the amount of fluids you drink. If the doctor says it's okay, drink plenty of fluids. This means drinking enough so that your urine is light yellow or clear like water. · Avoid strenuous exercise until your doctor tells you it is okay. · Potassium is in many foods, including vegetables, fruits, and milk products. Foods high in potassium include bananas, cantaloupe, broccoli, milk, potatoes, and tomatoes. · Low potassium foods include blueberries, raspberries, cucumber, white or brown rice, spaghetti, and macaroni. · Do not use a salt substitute without talking to your doctor first. Most of these are very high in potassium. · Be sure to tell your doctor about any prescription, over-the-counter, or herbal medicines you take. Some of these can raise potassium. When should you call for help? Call 911 anytime you think you may need emergency care. For example, call if:   · You passed out (lost consciousness).  
  · You have an unusual heartbeat. Your heart may beat fast or skip beats.  
 Call your doctor now or seek immediate medical care if: 
  · You have muscle aches.  
  · You feel very weak.  
 Watch closely for changes in your health, and be sure to contact your doctor if: 
  · You do not get better as expected. Where can you learn more? Go to http://mello-amber.info/. Enter M406 in the search box to learn more about \"Hyperkalemia: Care Instructions. \" Current as of: May 12, 2017 Content Version: 11.7 © 0799-3981 [employer]. Care instructions adapted under license by [employer] (which disclaims liability or warranty for this information). If you have questions about a medical condition or this instruction, always ask your healthcare professional. [address] any warranty or liability for your use of this information. DISCHARGE SUMMARY from Nurse PATIENT INSTRUCTIONS: 
 
After general anesthesia or intravenous sedation, for 24 hours or while taking prescription Narcotics: · Limit your activities · Do not drive and operate hazardous machinery · Do not make important personal or business decisions · Do  not drink alcoholic beverages · If you have not urinated within 8 hours after discharge, please contact your surgeon on call. Report the following to your surgeon: 
· Excessive pain, swelling, redness or odor of or around the surgical area · Temperature over 100.5 · Nausea and vomiting lasting longer than 4 hours or if unable to take medications · Any signs of decreased circulation or nerve impairment to extremity: change in color, persistent  numbness, tingling, coldness or increase pain · Any questions What to do at Home: *  Please give a list of your current medications to your Primary Care Provider.  
 
*  Please update this list whenever your medications are discontinued, doses are 
    changed, or new medications (including over-the-counter products) are added. *  Please carry medication information at all times in case of emergency situations. These are general instructions for a healthy lifestyle: No smoking/ No tobacco products/ Avoid exposure to second hand smoke Surgeon General's Warning:  Quitting smoking now greatly reduces serious risk to your health. Obesity, smoking, and sedentary lifestyle greatly increases your risk for illness A healthy diet, regular physical exercise & weight monitoring are important for maintaining a healthy lifestyle You may be retaining fluid if you have a history of heart failure or if you experience any of the following symptoms:  Weight gain of 3 pounds or more overnight or 5 pounds in a week, increased swelling in our hands or feet or shortness of breath while lying flat in bed. Please call your doctor as soon as you notice any of these symptoms; do not wait until your next office visit. Recognize signs and symptoms of STROKE: 
 
F-face looks uneven A-arms unable to move or move unevenly S-speech slurred or non-existent T-time-call 911 as soon as signs and symptoms begin-DO NOT go Back to bed or wait to see if you get better-TIME IS BRAIN. Warning Signs of HEART ATTACK Call 911 if you have these symptoms: 
? Chest discomfort. Most heart attacks involve discomfort in the center of the chest that lasts more than a few minutes, or that goes away and comes back. It can feel like uncomfortable pressure, squeezing, fullness, or pain. ? Discomfort in other areas of the upper body. Symptoms can include pain or discomfort in one or both arms, the back, neck, jaw, or stomach. ? Shortness of breath with or without chest discomfort. ? Other signs may include breaking out in a cold sweat, nausea, or lightheadedness. Don't wait more than five minutes to call [email or web address]!  Fast action can save your life. Calling 911 is almost always the fastest way to get lifesaving treatment. Emergency Medical Services staff can begin treatment when they arrive  up to an hour sooner than if someone gets to the hospital by car. The discharge information has been reviewed with the patient. The patient verbalized understanding. Discharge medications reviewed with the patient and appropriate educational materials and side effects teaching were provided. ___________________________________________________________________________________________________________________________________ ACO Transitions of Care [facility] offers a voluntary care coordination program to provide high quality service and care to [facility] fee-for-service beneficiaries. Shanelle Felton was designed to help you enhance your health and well-being through the following services: ? Transitions of Care  support for individuals who are transitioning from one care setting to another (example: Hospital to home). ? Chronic and Complex Care Coordination  support for individuals and caregivers of those with serious or chronic illnesses or with more than one chronic (ongoing) condition and those who take a number of different medications. If you meet specific medical criteria, a [address] [place] [address] Rd may call you directly to coordinate your care with your primary care physician and your other care providers. For questions about the [facility] programs, please, contact your physicians office. For general questions or additional information about Accountable Care Organizations: 
Please visit www.medicare.gov/acos. html or call 1-800-MEDICARE (3-179.783.9821) TTY users should call 9-908.248.3345. Sofeahart Announcement  We are excited to announce that we are making your provider's discharge notes available to you in Yecuris. You will see these notes when they are completed and signed by the physician that discharged you from your recent hospital stay. If you have any questions or concerns about any information you see in Yecuris, please call the Health Information Department where you were seen or reach out to your Primary Care Provider for more information about your plan of care. Introducing [facility]! Norris Jackson introduces Yecuris patient portal. Now you can access parts of your medical record, email your doctor's office, and request medication refills online. 1. In your internet browser, go to [email or web address] 2. Click on the First Time User? Click Here link in the Sign In box. You will see the New Member Sign Up page. 3. Enter your Yecuris Access Code exactly as it appears below. You will not need to use this code after youve completed the sign-up process. If you do not sign up before the expiration date, you must request a new code. · Yecuris Access Code: [email or web address] Expires: 10/8/2018  1:49 PM 
 
4. Enter the last four digits of your Social Security Number (xxxx) and Date of Birth (mm/dd/yyyy) as indicated and click Submit. You will be taken to the next sign-up page. 5. Create a Yecuris ID. This will be your Yecuris login ID and cannot be changed, so think of one that is secure and easy to remember. 6. Create a Yecuris password. You can change your password at any time. 7. Enter your Password Reset Question and Answer. This can be used at a later time if you forget your password. 8. Enter your e-mail address. You will receive e-mail notification when new information is available in [address] 9. Click Sign Up. You can now view and download portions of your medical record. 10. Click the Download Summary menu link to download a portable copy of your medical information. If you have questions, please visit the Frequently Asked Questions section of the MyChart website. Remember, SwapDrive is NOT to be used for urgent needs. For medical emergencies, dial 911. Now available from your iPhone and Android! Introducing Garrett Sarmiento As a [facility] patient, I wanted to make you aware of our electronic visit tool called Garrett Sarmiento. eMithilaHaat allows you to connect within minutes with a medical provider 24 hours a day, seven days a week via a mobile device or tablet or logging into a secure website from your computer. You can access Garrett Sarmiento from anywhere in the [place]. A virtual visit might be right for you when you have a simple condition and feel like you just dont want to get out of bed, or cant get away from work for an appointment, when your regular [facility] provider is not available (evenings, weekends or holidays), or when youre out of town and need minor care. Electronic visits cost only $49 and if the Marroquin[employer]ufferfish provider determines a prescription is needed to treat your condition, one can be electronically transmitted to a nearby pharmacy*. Please take a moment to enroll today if you have not already done so. The enrollment process is free and takes just a few minutes. To enroll, please download the eMithilaHaat oriana to your tablet or phone, or visit [email or web address] to enroll on your computer. And, as an [address] [place] [address] patient with a [employer] account, the results of your visits will be scanned into your electronic medical record and your primary care provider will be able to view the scanned results. We urge you to continue to see your regular [facility] provider for your ongoing medical care.   And while your primary care provider may not be the one available when you seek a Garrett Sarmiento virtual visit, the peace of mind you get from getting a real diagnosis real time can be priceless. For more information on Garrett Deionlorena, view our Frequently Asked Questions (FAQs) at [email or web address]. Sincerely, 
 
Sumanth Frank MD 
Chief Medical Officer Abril Becker *:  certain medications cannot be prescribed via Garrett Sarmiento Providers Seen During Your Hospitalization Provider Specialty Primary office phone Kurt Archuleta MD Emergency Medicine [phone] Mis Beltran MD Internal Medicine [phone] Your Primary Care Physician (PCP) Primary Care Physician Office Phone Office Fax NONE ** None ** ** None ** You are allergic to the following Allergen Reactions Adhesive Other (comments) Skin tears Recent Documentation Height Weight Breastfeeding? BMI OB Status Smoking Status 1.6 m 66.2 kg No 25.86 kg/m2 Postmenopausal Former Smoker Emergency Contacts Name Discharge Info Relation Home Work Mobile Deandra Hale  Child [2] [id number] Patient Belongings The following personal items are in your possession at time of discharge: 
  Dental Appliances: Lowers, Uppers, With patient  Visual Aid: None      Home Medications: None   Jewelry: None  Clothing: Pajamas    Other Valuables: None  Personal Items Sent to Safe: nothing Discharge Instructions Attachments/References CHEST CONTUSION (ENGLISH) Patient Handouts Chest Contusion: Care Instructions Your Care Instructions A chest contusion, or bruise, is caused by a fall or direct blow to the chest. Car crashes, falls, getting punched, and injury from bicycle handlebars are common causes of chest contusions. A very forceful blow to the chest can injure the heart or blood vessels in the chest, the lungs, the airway, the liver, or the spleen. Pain may be caused by an injury to muscles, cartilage, or ribs.  Deep breathing, coughing, or sneezing can increase your pain. Lying on the injured area also can cause pain. Follow-up care is a key part of your treatment and safety. Be sure to make and go to all appointments, and call your doctor if you are having problems. It's also a good idea to know your test results and keep a list of the medicines you take. How can you care for yourself at home? · Rest and protect the injured or sore area. Stop, change, or take a break from any activity that may be causing your pain. · Put ice or a cold pack on the area for 10 to 20 minutes at a time. Put a thin cloth between the ice and your skin. · After 2 or 3 days, if your swelling is gone, apply a heating pad set on low or a warm cloth to your chest. Some doctors suggest that you go back and forth between hot and cold. Put a thin cloth between the heating pad and your skin. · Do not wrap or tape your ribs for support. This may cause you to take smaller breaths, which could increase your risk of pneumonia and lung collapse. · Ask your doctor if you can take an over-the-counter pain medicine, such as acetaminophen (Tylenol), ibuprofen (Advil, Motrin), or naproxen (Aleve). Be safe with medicines. Read and follow all instructions on the label. · Take your medicines exactly as prescribed. Call your doctor if you think you are having a problem with your medicine. · Gentle stretching and massage may help you feel better after a few days of rest. Stretch slowly to the point just before discomfort begins, then hold the stretch for at least 15 to 30 seconds. Do this 3 or 4 times per day. · As your pain gets better, slowly return to your normal activities. Be patient, because chest bruises can take weeks or months to heal. Any increased pain may be a sign that you need to rest a while longer. When should you call for help? Call 911 anytime you think you may need emergency care. For example, call if:   · You have severe trouble breathing.  
  · You cough up blood.  
 Call your doctor now or seek immediate medical care if: 
  · You have belly pain.  
  · You are dizzy or lightheaded, or you feel like you may faint.  
  · You develop new symptoms with the chest pain.  
  · Your chest pain gets worse.  
  · You have a fever.  
  · You have some shortness of breath.  
  · You have a cough that brings up mucus from the lungs.  
 Watch closely for changes in your health, and be sure to contact your doctor if: 
  · Your chest pain is not improving after 1 week. Where can you learn more? Go to http://mello-amber.info/. Enter I174 in the search box to learn more about \"Chest Contusion: Care Instructions. \" Current as of: November 20, 2017 Content Version: 11.7 © 6321-7709 [employer]. Care instructions adapted under license by [employer] (which disclaims liability or warranty for this information). If you have questions about a medical condition or this instruction, always ask your healthcare professional. [address] any warranty or liability for your use of this information. Please provide this summary of care documentation to your next provider. Signatures-by signing, you are acknowledging that this After Visit Summary has been reviewed with you and you have received a copy. Patient Signature:  ____________________________________________________________ Date:  ____________________________________________________________  
  
Dionisio Horta Provider Signature:  ____________________________________________________________ Date:  ____________________________________________________________

## 2018-09-08 PROBLEM — I95.3 HEMODIALYSIS-ASSOCIATED HYPOTENSION: Status: ACTIVE | Noted: 2018-09-08

## 2018-09-08 PROBLEM — S20.211S: Status: ACTIVE | Noted: 2018-09-08

## 2018-09-08 LAB
ANION GAP SERPL CALC-SCNC: 15 MMOL/L (ref 7–16)
BUN SERPL-MCNC: 51 MG/DL (ref 8–23)
CALCIUM SERPL-MCNC: 7.4 MG/DL (ref 8.3–10.4)
CHLORIDE SERPL-SCNC: 99 MMOL/L (ref 98–107)
CO2 SERPL-SCNC: 26 MMOL/L (ref 21–32)
CREAT SERPL-MCNC: 11 MG/DL (ref 0.6–1)
ERYTHROCYTE [DISTWIDTH] IN BLOOD BY AUTOMATED COUNT: 17.6 %
GLUCOSE SERPL-MCNC: 103 MG/DL (ref 65–100)
HCT VFR BLD AUTO: 39 % (ref 35.8–46.3)
HGB BLD-MCNC: 13.2 G/DL (ref 11.7–15.4)
MAGNESIUM SERPL-MCNC: 2 MG/DL (ref 1.8–2.4)
MCH RBC QN AUTO: 34.6 PG (ref 26.1–32.9)
MCHC RBC AUTO-ENTMCNC: 33.8 G/DL (ref 31.4–35)
MCV RBC AUTO: 102.4 FL (ref 79.6–97.8)
NRBC # BLD: 0 K/UL (ref 0–0.2)
PHOSPHATE SERPL-MCNC: 6.1 MG/DL (ref 2.3–3.7)
PLATELET # BLD AUTO: 143 K/UL (ref 150–450)
PMV BLD AUTO: 11.2 FL (ref 9.4–12.3)
POTASSIUM SERPL-SCNC: 4.1 MMOL/L (ref 3.5–5.1)
POTASSIUM SERPL-SCNC: 4.9 MMOL/L (ref 3.5–5.1)
RBC # BLD AUTO: 3.81 M/UL (ref 4.05–5.2)
SODIUM SERPL-SCNC: 140 MMOL/L (ref 136–145)
WBC # BLD AUTO: 5.1 K/UL (ref 4.3–11.1)

## 2018-09-08 PROCEDURE — 85027 COMPLETE CBC AUTOMATED: CPT

## 2018-09-08 PROCEDURE — 74011250636 HC RX REV CODE- 250/636: Performed by: INTERNAL MEDICINE

## 2018-09-08 PROCEDURE — 36415 COLL VENOUS BLD VENIPUNCTURE: CPT

## 2018-09-08 PROCEDURE — 65660000000 HC RM CCU STEPDOWN

## 2018-09-08 PROCEDURE — 90935 HEMODIALYSIS ONE EVALUATION: CPT

## 2018-09-08 PROCEDURE — 83735 ASSAY OF MAGNESIUM: CPT

## 2018-09-08 PROCEDURE — 84100 ASSAY OF PHOSPHORUS: CPT

## 2018-09-08 PROCEDURE — 80048 BASIC METABOLIC PNL TOTAL CA: CPT

## 2018-09-08 PROCEDURE — 74011250637 HC RX REV CODE- 250/637: Performed by: INTERNAL MEDICINE

## 2018-09-08 RX ORDER — HEPARIN SODIUM 1000 [USP'U]/ML
5000 INJECTION, SOLUTION INTRAVENOUS; SUBCUTANEOUS
Status: DISCONTINUED | OUTPATIENT
Start: 2018-09-08 | End: 2018-09-10 | Stop reason: HOSPADM

## 2018-09-08 RX ORDER — METOPROLOL TARTRATE 25 MG/1
25 TABLET, FILM COATED ORAL 2 TIMES DAILY
Status: DISCONTINUED | OUTPATIENT
Start: 2018-09-08 | End: 2018-09-10 | Stop reason: HOSPADM

## 2018-09-08 RX ADMIN — Medication 1 AMPULE: at 21:07

## 2018-09-08 RX ADMIN — SEVELAMER CARBONATE 1600 MG: 800 TABLET, FILM COATED ORAL at 17:00

## 2018-09-08 RX ADMIN — Medication 10 ML: at 21:08

## 2018-09-08 RX ADMIN — ASPIRIN 81 MG: 81 TABLET, COATED ORAL at 12:26

## 2018-09-08 RX ADMIN — PANTOPRAZOLE SODIUM 40 MG: 40 TABLET, DELAYED RELEASE ORAL at 05:25

## 2018-09-08 RX ADMIN — HEPARIN SODIUM 5000 UNITS: 5000 INJECTION INTRAVENOUS; SUBCUTANEOUS at 21:06

## 2018-09-08 RX ADMIN — HEPARIN SODIUM 5000 UNITS: 1000 INJECTION, SOLUTION INTRAVENOUS; SUBCUTANEOUS at 07:32

## 2018-09-08 RX ADMIN — Medication 10 ML: at 15:54

## 2018-09-08 RX ADMIN — Medication 10 ML: at 05:25

## 2018-09-08 RX ADMIN — Medication 1 AMPULE: at 17:00

## 2018-09-08 RX ADMIN — HEPARIN SODIUM 5000 UNITS: 5000 INJECTION INTRAVENOUS; SUBCUTANEOUS at 05:24

## 2018-09-08 RX ADMIN — ZOLPIDEM TARTRATE 5 MG: 5 TABLET ORAL at 21:06

## 2018-09-08 RX ADMIN — SODIUM CHLORIDE 250 ML: 900 INJECTION, SOLUTION INTRAVENOUS at 15:53

## 2018-09-08 RX ADMIN — SEVELAMER CARBONATE 1600 MG: 800 TABLET, FILM COATED ORAL at 12:28

## 2018-09-08 NOTE — PROGRESS NOTES
Patient lying in bed  respirations even and unlabored on room air  No signs of distress  No complaints of pain  encouraged to call with needs

## 2018-09-08 NOTE — PROGRESS NOTES
Date of Outreach Update: Miesha Gonsales was seen and assessed. Patient resting comfortably in bed. Heart rate on remote tele is around  (improved from prior), still A. Fib. Signed By: René Cooper  September 7, 2018 10:19 PM

## 2018-09-08 NOTE — PROGRESS NOTES
Problem: Falls - Risk of 
Goal: *Absence of Falls Document Sebastián Gallego Fall Risk and appropriate interventions in the flowsheet. Outcome: Progressing Towards Goal 
Fall Risk Interventions: 
Mobility Interventions: Patient to call before getting OOB Medication Interventions: Bed/chair exit alarm Elimination Interventions: Call light in reach History of Falls Interventions: Room close to nurse's station

## 2018-09-08 NOTE — DIALYSIS
TRANSFER IN - DIALYSIS Received patient in dialysis unit from [facility] (unit) for ordered procedure. Consent verified for renal replacement therapy. Patient alert and vital signs stable. BP 94/47 P 83 Hemodialysis initiated using Right UAF and 15 g needles. Machine settings per MD order. Heparin 1000 units/hr. Will monitor during treatment.

## 2018-09-08 NOTE — PROGRESS NOTES
SBAR end of shift report given to oncoming RN. Pt stable and resting quietly in bed. Resp even,unlabored. Pt appears in no acute distress at this time. Safety measures in place.

## 2018-09-08 NOTE — PROGRESS NOTES
[facility] OUTREACH NURSE PROGRESS REPORT SUBJECTIVE: Called to assess patient secondary to nurse concern. MEWS Score: 3 (09/07/18 1218) Vitals:  
 09/07/18 1859 09/07/18 1926 09/07/18 1935 09/07/18 2028 BP: 100/52 130/62 110/68 94/51 Pulse: (!) 117 (!) 106 (!) 126 (!) 135 Resp:    18 Temp:    98 °F (36.7 °C) SpO2:    92% Weight:      
Height:      
  
EKG: atrial fibrillation, rate 120-130. LAB DATA: 
 
Recent Labs  
   09/07/18 
 1841  09/07/18 
 1554  09/07/18 
 1229 NA   --    --   128* K  3.4*  7.2*  6.9*  
CL   --    --   88* CO2   --    --   17* AGAP   --    --   23* GLU   --    --   115* BUN   --    --   112* CREA   --    --   18.10* GFRAA   --    --   2*  
GFRNA   --    --   2*  
CA   --    --   7.8* ALB   --    --   4.1 TP   --    --   8.7*  
GLOB   --    --   4.6* AGRAT   --    --   0.9* ALT   --    --   27 Recent Labs  
   09/07/18 
 1229 WBC  12.5* HGB  15.7* HCT  46.6*  
PLT  219 OBJECTIVE: On arrival to room, I found patient to be in dialysis, RN at bedside discontinuing dialysis treatment. Pain Assessment Pain Intensity 1: 4 (09/07/18 1218) Pain Location 1: Chest 
  
Patient Stated Pain Goal: 0 
 
  
  
  
  
 
  
  
  
   
 
ASSESSMENT:  Patient alert, no pain. Currently A. Fib, rate 120-130, BP adequate. Preparing for transfer and admission to floor. PLAN:  Spoke with Dr. Bharti Velasquez regarding elevated HR, patient has a history of A. Fib and reports taking scheduled medications this morning. Patient to received scheduled Lopressor at 2100, will evaluate response to medication before ordering additional rate management meds. Plan communicated with patient's primary RN on 8th floor.

## 2018-09-08 NOTE — PROGRESS NOTES
Date of Outreach Update: Chuck Mcgarry was seen and assessed. Patient sleeping in bed. Heart rate 80s on remote tele, A. Fib. Previous Outreach assessment has been reviewed. There have been no significant clinical changes since the completion of the last dated Outreach assessment. Signed By: Viviane Bustillo  September 8, 2018 1:26 AM

## 2018-09-08 NOTE — PROGRESS NOTES
Attempted visit Patient is a dialysis patient Will follow Giorgi Drake, staff Breana tapia [address], [id number] [facility] [place]  /   [email or web address]

## 2018-09-08 NOTE — DIALYSIS
TRANSFER OUT -DIALYSIS Hemodialysis treatment completed without complications. Patient alert and VS stable  BP 97/50  P 99   
 
 0 Kgs removed. Needles X2 removed from access and manual pressure held until hemostasis complete and pressure dressing applied. Patient to 837 after dialysis.

## 2018-09-08 NOTE — PROGRESS NOTES
[facility] OUTREACH NURSE PROGRESS REPORT SUBJECTIVE: Called to assess patient secondary to nurse concern. MEWS Score: 2 (09/08/18 0302) Vitals:  
 09/08/18 1009 09/08/18 1035 09/08/18 1155 09/08/18 1500 BP: (!) 94/34 (!) 85/47 (!) 86/49 (!) 87/51 Pulse: (!) 104 (!) 123 (!) 101 98 Resp:   20 18 Temp:   98 °F (36.7 °C) 97.5 °F (36.4 °C) SpO2:   93% 94% Weight:      
Height: OBJECTIVE: On arrival to room, I found patient to be sitting up in bed, with family at bedside. ASSESSMENT:  VSS on RA. Patient states she is going home in the morning. Family voices no concerns. PLAN:  Will continue to round per outreach protocol

## 2018-09-08 NOTE — PROGRESS NOTES
Hospitalist Progress Note Admit Date:  2018  1:02 PM  
Name:  Franny Mata Age:  80 y.o. 
:  3/14/1933 MRN:  [id number] PCP:  None Treatment Team: Attending Provider: Marquise Edwards MD; Consulting Provider: Jeromy Maher MD; Utilization Review: Swapnil Ross Subjective:  
Late entry (2018) CC:  SOB States breathing improved. Feels weak. No N/V/D. No F/C. Right lower rib cage pain. Other than above a 10 pt ROS is negative Objective:  
 
Patient Vitals for the past 24 hrs: 
 Temp Pulse Resp BP SpO2  
18 1155 98 °F (36.7 °C) (!) 101 20 (!) 86/49 93 % 18 1035 - (!) 123 - (!) 85/47 -  
18 1009 - (!) 104 - (!) 94/34 -  
18 0930 - (!) 116 - (!) 83/50 -  
18 0900 - (!) 102 - (!) 84/47 -  
18 0836 - 98 - (!) 87/46 -  
18 0755 - 95 - (!) 84/48 -  
18 0726 - 83 - 94/47 -  
18 0700 97.6 °F (36.4 °C) 82 16 92/53 93 % 18 0302 98 °F (36.7 °C) 84 18 96/61 95 % 18 2309 97.5 °F (36.4 °C) 72 18 96/62 98 % 18 2028 98 °F (36.7 °C) (!) 135 18 94/51 92 % 18 1935 - (!) 126 - 110/68 -  
18 1926 - (!) 106 - 130/62 -  
18 1859 - (!) 117 - 100/52 -  
18 1829 - 91 - 102/60 -  
18 1759 - (!) 114 - (!) 86/54 -  
18 1748 - (!) 120 - 95/56 -  
18 1747 - (!) 114 - (!) 80/50 -  
18 1740 - 88 - 102/57 -  
18 1730 - 84 20 93/51 -  
18 1704 95.9 °F (35.5 °C) 87 - 99/54 -  
18 1636 - 92 - 103/51 94 % 18 1635 - 79 14 - 95 % 18 1621 - 74 20 99/54 95 % 18 1606 - 93 17 114/57 95 % 18 1551 - 78 19 96/52 94 % 18 1531 - 82 15 - 94 % 18 1521 - - - 98/56 95 % 18 1506 - 97 21 (!) 83/53 97 % 18 1452 - 97 15 115/55 95 % 18 1421 - 90 25 100/59 96 % Oxygen Therapy O2 Sat (%): 93 % (18 1155) Pulse via Oximetry: 90 beats per minute (18 1636) O2 Device: Room air (18 1218) Intake/Output Summary (Last 24 hours) at 09/08/18 1415 Last data filed at 09/08/18 1247 Gross per 24 hour Intake              240 ml Output              300 ml Net              -60 ml General:    Well nourished. AAOx3, NAD Head:  Scabs right temple NEck:  Supple CV:   Irreg rhythm, nml rate Lungs:   CTAB. No wheezing, rhonchi, or rales. TTP right lower ant rib cage, no crepitance Abdomen:   Soft, nontender, nondistended. Extremities: Warm and dry. No cyanosis or edema. Skin:     No rashes or jaundice. Cap refill<2.5 sec. Scattered ecchymoses Neuro:  Nonfocal 
Data Review: 
I have reviewed all labs, meds, telemetry events, and studies from the last 24 hours. Recent Results (from the past 24 hour(s)) POTASSIUM Collection Time: 09/07/18  3:54 PM  
Result Value Ref Range Potassium 7.2 (HH) 3.5 - 5.1 mmol/L  
POTASSIUM Collection Time: 09/07/18  6:41 PM  
Result Value Ref Range Potassium 3.4 (L) 3.5 - 5.1 mmol/L  
POTASSIUM Collection Time: 09/07/18  9:45 PM  
Result Value Ref Range Potassium 4.1 3.5 - 5.1 mmol/L METABOLIC PANEL, BASIC Collection Time: 09/08/18  6:50 AM  
Result Value Ref Range Sodium 140 136 - 145 mmol/L Potassium 4.9 3.5 - 5.1 mmol/L Chloride 99 98 - 107 mmol/L  
 CO2 26 21 - 32 mmol/L Anion gap 15 7 - 16 mmol/L Glucose 103 (H) 65 - 100 mg/dL BUN 51 (H) 8 - 23 MG/DL Creatinine 11.00 (H) 0.6 - 1.0 MG/DL  
 GFR est AA 4 (L) >60 ml/min/1.73m2 GFR est non-AA 4 (L) >60 ml/min/1.73m2 Calcium 7.4 (L) 8.3 - 10.4 MG/DL  
CBC W/O DIFF Collection Time: 09/08/18  6:50 AM  
Result Value Ref Range WBC 5.1 4.3 - 11.1 K/uL  
 RBC 3.81 (L) 4.05 - 5.2 M/uL  
 HGB 13.2 11.7 - 15.4 g/dL HCT 39.0 35.8 - 46.3 % .4 (H) 79.6 - 97.8 FL  
 MCH 34.6 (H) 26.1 - 32.9 PG  
 MCHC 33.8 31.4 - 35.0 g/dL  
 RDW 17.6 % PLATELET 564 (L) 146 - 450 K/uL MPV 11.2 9.4 - 12.3 FL ABSOLUTE NRBC 0.00 0.0 - 0.2 K/uL MAGNESIUM  
 Collection Time: 09/08/18  6:50 AM  
Result Value Ref Range Magnesium 2.0 1.8 - 2.4 mg/dL PHOSPHORUS Collection Time: 09/08/18  6:50 AM  
Result Value Ref Range Phosphorus 6.1 (H) 2.3 - 3.7 MG/DL All Micro Results None Current Meds: 
Current Facility-Administered Medications Medication Dose Route Frequency  heparin (porcine) 1,000 unit/mL injection 5,000 Units  5,000 Units Hemodialysis DIALYSIS PRN  
 alcohol 62% (NOZIN) nasal  1 Ampule  1 Ampule Topical Q12H  aspirin delayed-release tablet 81 mg  81 mg Oral DAILY  dilTIAZem CD (CARDIZEM CD) capsule 180 mg  180 mg Oral DAILY  pantoprazole (PROTONIX) tablet 40 mg  40 mg Oral ACB  metoprolol tartrate (LOPRESSOR) tablet 50 mg  50 mg Oral BID  sevelamer carbonate (RENVELA) tab 1,600 mg  1,600 mg Oral TID WITH MEALS  zolpidem (AMBIEN) tablet 5 mg  5 mg Oral QHS  sodium chloride (NS) flush 5-10 mL  5-10 mL IntraVENous Q8H  
 sodium chloride (NS) flush 5-10 mL  5-10 mL IntraVENous PRN  
 acetaminophen (TYLENOL) tablet 650 mg  650 mg Oral Q4H PRN  
 ondansetron (ZOFRAN) injection 4 mg  4 mg IntraVENous Q6H PRN  
 magnesium hydroxide (MILK OF MAGNESIA) 400 mg/5 mL oral suspension 30 mL  30 mL Oral DAILY PRN  
 heparin (porcine) injection 5,000 Units  5,000 Units SubCUTAneous Q8H Other Studies (last 24 hours): No results found. Assessment and Plan:  
 
Hospital Problems as of 9/8/2018  Date Reviewed: 7/31/2018 Codes Class Noted - Resolved POA Chest wall contusion, right, sequela 
-sig ttp 
-pain control 
-incentive spirom ICD-10-CM: O91.190L 
ICD-9-CM: 906.3  9/8/2018 - Present Yes Hemodialysis-associated hypotension 
-monitor 
-hold BP meds as necessary 
-she is asymptomatic 
-I do not suspect infection ICD-10-CM: I95.3 ICD-9-CM: 458.21  9/8/2018 - Present No  
   
 Hyperkalemia 
-resolved s/p HD ICD-10-CM: E87.5 ICD-9-CM: 276.7  9/7/2018 - Present Unknown Metabolic acidosis 
-resolved ICD-10-CM: E87.2 ICD-9-CM: 276.2  9/7/2018 - Present Unknown ESRD (end stage renal disease) ([facility] 
-On HD ICD-10-CM: N18.6 ICD-9-CM: 585.6  9/7/2018 - Present Unknown ESRD (end stage renal disease) [facility] dialysis ([facility] ICD-10-CM: N18.6, Z99.2 ICD-9-CM: 585.6, V45.11  5/16/2013 - Present Unknown Atrial Fibrillation 
-rate control improved 
-asa for stroke prevention Pt is weak and BP soft. May give fluid bolus but cautious as she is on HD. Do not suspect infection. Pt has right ant rib pain and is weak. Will c/s PT/OT. May need placement. PLAN:   
·  
 
DC planning/Dispo: DVT ppx:   
 
Signed: 
Lore Prajapati MD

## 2018-09-08 NOTE — PROGRESS NOTES
Pt arrived to  in bed via dialysis RN's. Pt stable. Assessment complete. Pt is lying in bed, family member present at bedside. Resp even, unlabored. Pt is alert, orient X 3. Pt appears in no acute distress at this time. Pt is on RA. Pt has R upper arm access, bruit and thrill present. Pt has colostomy in place. Pt encouraged to call for assistance, call light in reach. Safety measures in place. Will continue to monitor Dual skin assessment completed with Madelyn De Luna RN. Pt skin noted to be intact with no skin breakdown at this time. Pt does have redness noted in lower R quadrant of abdomen around colostomy site extending to R groin area. Pt denies burning, pain, or itching in the area.

## 2018-09-09 LAB
ANION GAP SERPL CALC-SCNC: 16 MMOL/L (ref 7–16)
BUN SERPL-MCNC: 23 MG/DL (ref 8–23)
CALCIUM SERPL-MCNC: 7 MG/DL (ref 8.3–10.4)
CHLORIDE SERPL-SCNC: 100 MMOL/L (ref 98–107)
CO2 SERPL-SCNC: 25 MMOL/L (ref 21–32)
CREAT SERPL-MCNC: 7.81 MG/DL (ref 0.6–1)
ERYTHROCYTE [DISTWIDTH] IN BLOOD BY AUTOMATED COUNT: 17.1 %
GLUCOSE SERPL-MCNC: 118 MG/DL (ref 65–100)
HCT VFR BLD AUTO: 38.8 % (ref 35.8–46.3)
HGB BLD-MCNC: 12.6 G/DL (ref 11.7–15.4)
MCH RBC QN AUTO: 33.9 PG (ref 26.1–32.9)
MCHC RBC AUTO-ENTMCNC: 32.5 G/DL (ref 31.4–35)
MCV RBC AUTO: 104.3 FL (ref 79.6–97.8)
NRBC # BLD: 0 K/UL (ref 0–0.2)
PLATELET # BLD AUTO: 133 K/UL (ref 150–450)
PMV BLD AUTO: 11.2 FL (ref 9.4–12.3)
POTASSIUM SERPL-SCNC: 4.2 MMOL/L (ref 3.5–5.1)
RBC # BLD AUTO: 3.72 M/UL (ref 4.05–5.2)
SODIUM SERPL-SCNC: 141 MMOL/L (ref 136–145)
TSH SERPL DL<=0.005 MIU/L-ACNC: 1.98 UIU/ML (ref 0.36–3.74)
WBC # BLD AUTO: 3.9 K/UL (ref 4.3–11.1)

## 2018-09-09 PROCEDURE — 74011250636 HC RX REV CODE- 250/636: Performed by: INTERNAL MEDICINE

## 2018-09-09 PROCEDURE — 85027 COMPLETE CBC AUTOMATED: CPT

## 2018-09-09 PROCEDURE — 74011250637 HC RX REV CODE- 250/637: Performed by: INTERNAL MEDICINE

## 2018-09-09 PROCEDURE — 80048 BASIC METABOLIC PNL TOTAL CA: CPT

## 2018-09-09 PROCEDURE — 97530 THERAPEUTIC ACTIVITIES: CPT

## 2018-09-09 PROCEDURE — 84443 ASSAY THYROID STIM HORMONE: CPT

## 2018-09-09 PROCEDURE — 65660000000 HC RM CCU STEPDOWN

## 2018-09-09 PROCEDURE — 97161 PT EVAL LOW COMPLEX 20 MIN: CPT

## 2018-09-09 PROCEDURE — 36415 COLL VENOUS BLD VENIPUNCTURE: CPT

## 2018-09-09 RX ORDER — DILTIAZEM HYDROCHLORIDE 30 MG/1
30 TABLET, FILM COATED ORAL
Status: DISCONTINUED | OUTPATIENT
Start: 2018-09-09 | End: 2018-09-10

## 2018-09-09 RX ADMIN — Medication 5 ML: at 21:11

## 2018-09-09 RX ADMIN — DILTIAZEM HYDROCHLORIDE 30 MG: 30 TABLET, FILM COATED ORAL at 13:24

## 2018-09-09 RX ADMIN — HEPARIN SODIUM 5000 UNITS: 5000 INJECTION INTRAVENOUS; SUBCUTANEOUS at 18:04

## 2018-09-09 RX ADMIN — ACETAMINOPHEN 650 MG: 325 TABLET, FILM COATED ORAL at 14:49

## 2018-09-09 RX ADMIN — ASPIRIN 81 MG: 81 TABLET, COATED ORAL at 09:05

## 2018-09-09 RX ADMIN — SEVELAMER CARBONATE 1600 MG: 800 TABLET, FILM COATED ORAL at 09:05

## 2018-09-09 RX ADMIN — Medication 5 ML: at 05:22

## 2018-09-09 RX ADMIN — SEVELAMER CARBONATE 1600 MG: 800 TABLET, FILM COATED ORAL at 18:04

## 2018-09-09 RX ADMIN — Medication 1 AMPULE: at 09:05

## 2018-09-09 RX ADMIN — ZOLPIDEM TARTRATE 5 MG: 5 TABLET ORAL at 21:10

## 2018-09-09 RX ADMIN — SEVELAMER CARBONATE 1600 MG: 800 TABLET, FILM COATED ORAL at 11:26

## 2018-09-09 RX ADMIN — DILTIAZEM HYDROCHLORIDE 30 MG: 30 TABLET, FILM COATED ORAL at 18:04

## 2018-09-09 RX ADMIN — Medication 10 ML: at 13:25

## 2018-09-09 RX ADMIN — HEPARIN SODIUM 5000 UNITS: 5000 INJECTION INTRAVENOUS; SUBCUTANEOUS at 21:07

## 2018-09-09 RX ADMIN — HEPARIN SODIUM 5000 UNITS: 5000 INJECTION INTRAVENOUS; SUBCUTANEOUS at 05:21

## 2018-09-09 RX ADMIN — PANTOPRAZOLE SODIUM 40 MG: 40 TABLET, DELAYED RELEASE ORAL at 05:22

## 2018-09-09 RX ADMIN — Medication 1 AMPULE: at 21:09

## 2018-09-09 NOTE — PROGRESS NOTES
Patient sleeping  Respirations even and unlabored on room air  Patient sat in recliner most of the afternoon  No signs of distress  No visual cues of pain

## 2018-09-09 NOTE — PROGRESS NOTES
[facility] Nephrology Follow-Up on:ESRD/Hyperkalemia HPI: Pt seen and examined ROS: 
Kvng CP, SOB. Current Facility-Administered Medications Medication Dose Route Frequency  heparin (porcine) 1,000 unit/mL injection 5,000 Units  5,000 Units Hemodialysis DIALYSIS PRN  
 alcohol 62% (NOZIN) nasal  1 Ampule  1 Ampule Topical Q12H  
 metoprolol tartrate (LOPRESSOR) tablet 25 mg  25 mg Oral BID  aspirin delayed-release tablet 81 mg  81 mg Oral DAILY  dilTIAZem CD (CARDIZEM CD) capsule 180 mg  180 mg Oral DAILY  pantoprazole (PROTONIX) tablet 40 mg  40 mg Oral ACB  sevelamer carbonate (RENVELA) tab 1,600 mg  1,600 mg Oral TID WITH MEALS  zolpidem (AMBIEN) tablet 5 mg  5 mg Oral QHS  sodium chloride (NS) flush 5-10 mL  5-10 mL IntraVENous Q8H  
 sodium chloride (NS) flush 5-10 mL  5-10 mL IntraVENous PRN  
 acetaminophen (TYLENOL) tablet 650 mg  650 mg Oral Q4H PRN  
 ondansetron (ZOFRAN) injection 4 mg  4 mg IntraVENous Q6H PRN  
 magnesium hydroxide (MILK OF MAGNESIA) 400 mg/5 mL oral suspension 30 mL  30 mL Oral DAILY PRN  
 heparin (porcine) injection 5,000 Units  5,000 Units SubCUTAneous Q8H Exam: 
Vitals:  
 09/08/18 2119 09/08/18 2313 09/09/18 0304 09/09/18 0700 BP: 106/56 92/56 93/61 95/59 Pulse:  92 (!) 111 (!) 108 Resp:  18 18 16 Temp:  98.1 °F (36.7 °C) 97.9 °F (36.6 °C) 97.4 °F (36.3 °C) SpO2:  94% 92% 94% Weight:   67.1 kg (147 lb 14.4 oz) Height:      
 
 
 
Intake/Output Summary (Last 24 hours) at 09/09/18 1660 Last data filed at 09/09/18 0900 Gross per 24 hour Intake              600 ml Output                0 ml Net              600 ml PE: 
GEN - in no distress CV - regular, no murmur, no rub Lung - clear bilaterally Abd - soft, nontender Ext - no edema Labs Recent Labs  
   09/09/18 
 0636  09/08/18 
 0650  09/07/18 
 1229 WBC  3.9*  5.1  12.5* HGB  12.6  13.2  15.7* HCT  38.8  39.0  46.6*  
 PLT  133*  143*  219 Recent Labs  
   09/09/18 
 0636  09/08/18 
 0650  09/07/18 
 2145   09/07/18 
 1229 NA  141  140   --    --   128* K  4.2  4.9  4.1   < >  6.9*  
CL  100  99   --    --   88* CO2  25  26   --    --   17*  
BUN  23  51*   --    --   112* CREA  7.81*  11.00*   --    --   18.10* GLU  118*  103*   --    --   115* CA  7.0*  7.4*   --    --   7.8*  
MG   --   2.0   --    --    --   
PHOS   --   6.1*   --    --    --   
 < > = values in this interval not displayed. No results for input(s): PH, PCO2, PO2, PCO2 in the last 72 hours. Problem List: 
Patient Active Problem List  
 Diagnosis Date Noted  Chest wall contusion, right, sequela 09/08/2018  Hemodialysis-associated hypotension 09/08/2018  Hyperkalemia 09/07/2018  Metabolic acidosis 82/10/1861  ESRD (end stage renal disease) ([facility] 09/07/2018  Pleural effusion, right 07/31/2018  Shortness of breath 07/30/2018  Pulmonary edema 07/30/2018  Edema 01/19/2016  Anemia 01/19/2016  Chronic renal failure 01/19/2016  Hypertension, benign 01/19/2016  Aortic stenosis 01/19/2016  Palpitations 01/19/2016  Carotid stenosis without Infarct 01/19/2016  CAD (coronary artery disease) 01/19/2016  Hyperlipidemia 01/19/2016  Paroxysmal tachycardia ([facility] 01/19/2016  Paroxysmal atrial fibrillation ([facility] 01/19/2016  Atrial fibrillation, chronic ([facility] 01/14/2016  
 NSTEMI (non-ST elevated myocardial infarction) ([facility] 06/26/2013  Dyslipidemia 06/26/2013  Gout 06/26/2013  Chronic anemia 06/26/2013  Peritonitis, dialysis-associated ([address] 05/17/2013  Symptomatic cholelithiasis 05/17/2013  Intractable nausea and vomiting 05/16/2013  Abdominal pain, chronic, right lower quadrant 05/16/2013  Leukocytosis 05/16/2013  ESRD (end stage renal disease) on dialysis ([facility] 05/16/2013  Hypokalemia 05/16/2013  Serum lipase elevation 05/16/2013  History of ileostomy 05/16/2013  Crohn's disease ([facility] 07/03/2009  Pleural effusion, left 07/02/2009  Hypomagnesemia 06/23/2009 Issues Addressed By Nephrology: 
 
Plan: 1. ESRD HD 2. Hyperkalemia resolved 3. S/p Fall 4. Colostomy bags intact 5. Hypotension on metoprolol and cardizemCD 180 for atrial fib and tachycardia; change cardizem to IR 30mg tid and see if BP will increase Will need assistance at home to prevent future falls Case d/w Dr. Onita Nyhan

## 2018-09-09 NOTE — PROGRESS NOTES
Patient confused tonight . Patient stated that she want to see her sister and that she would like to go to her sitting room. Nurse  reoriented  patient. Will monitor.

## 2018-09-09 NOTE — PROGRESS NOTES
Patient eating breakfast  Respirations even and unlabored on room air  No signs of distress  No complaints of pain  Safety measures in place  Will continue to monitor

## 2018-09-09 NOTE — PROGRESS NOTES
Date of Outreach Update: Marlon Parmar was seen and assessed. MEWS Score: 2 (09/08/18 1920) Vitals:  
 09/08/18 1155 09/08/18 1500 09/08/18 1920 09/08/18 2119 BP: (!) 86/49 (!) 87/51 (!) 88/58 106/56 Pulse: (!) 101 98 97 Resp: 20 18 18 Temp: 98 °F (36.7 °C) 97.5 °F (36.4 °C) 97.5 °F (36.4 °C) SpO2: 93% 94% 93% Weight:      
Height:      
  
 
 Pain Assessment Pain Intensity 1: 0 (09/08/18 1351) Pain Location 1: Chest 
  
Patient Stated Pain Goal: 0 Previous Outreach assessment has been reviewed. There have been no significant clinical changes since the completion of the last dated Outreach assessment. Pt resting comfortably. IVF infusing. Will continue to follow up per outreach protocol. Signed By:   Tegan Ellis RN   September 8, 2018 9:42 PM

## 2018-09-09 NOTE — PROGRESS NOTES
Hospitalist Progress Note Admit Date:  2018  1:02 PM  
Name:  Marlon Parmar Age:  80 y.o. 
:  3/14/1933 MRN:  [id number] PCP:  None Treatment Team: Attending Provider: En Yoon MD; Consulting Provider: Nirmal Alanis MD; Utilization Review: Sung Hinojosa Subjective: The patient is an 81 y/o lady with ESRD on HD, Paroxysmal A Fib, CAD, Aortic Stenosis, Dyslipidemia, GERD, Gout, HTN, Crohn's disease s/p colectomy and ileostomy, presented to the ED at [facility] after a fall. The patient actually fell while getting out of the bathtub. She also reports some right chest wall pain, but also some nausea, fatigue, anorexia and generalized weakness. Of note, her last hemodialysis session was 1 week ago. Labs obtained in the ED were significant for BUN of 112, Cr 18.1, K 6.9, CO2 17, Na 128, AG 23. Nephrology was consulted and advised admission to the ICU for HD at the bedside. The Hospitalist service was then contacted and the patient was admitted for Hyperkalemia and High Anion Gap Metabolic Acidosis in the context of ESRD on HD. CC:  SOB 
 
HPI:  Continues to feel better. More energy. Breathing improved. Pain left ant rib cage since fall. 
  
ROS: 10 systems reviewed and negative except as noted in HPI. Objective:  
 
Patient Vitals for the past 24 hrs: 
 Temp Pulse Resp BP SpO2  
18 1100 97.6 °F (36.4 °C) (!) 108 16 97/59 94 % 18 0700 97.4 °F (36.3 °C) (!) 108 16 95/59 94 % 18 0304 97.9 °F (36.6 °C) (!) 111 18 93/61 92 % 18 2313 98.1 °F (36.7 °C) 92 18 92/56 94 % 189 - - - 106/56 -  
18 1920 97.5 °F (36.4 °C) 97 18 (!) 88/58 93 % 18 1500 97.5 °F (36.4 °C) 98 18 (!) 87/51 94 % Oxygen Therapy O2 Sat (%): 94 % (18 1100) Pulse via Oximetry: 90 beats per minute (18 1636) O2 Device: Room air (18 1218) Intake/Output Summary (Last 24 hours) at 18 1226 Last data filed at 18 0900 Gross per 24 hour Intake              600 ml Output                0 ml Net              600 ml General:    Well nourished. AAOx3, NAD Head:  Scabs right temple NEck:  Supple CV:   Irreg rhythm, nml rate Lungs:   CTAB. No wheezing, rhonchi, or rales. TTP left lower ant rib cage, no crepitance Abdomen:   Soft, nontender, nondistended. Extremities: Warm and dry. No cyanosis or edema. Skin:     No rashes or jaundice. Cap refill<2.5 sec. Scattered ecchymoses Neuro:  Nonfocal 
Data Review: 
I have reviewed all labs, meds, telemetry events, and studies from the last 24 hours. Recent Results (from the past 24 hour(s)) METABOLIC PANEL, BASIC Collection Time: 09/09/18  6:36 AM  
Result Value Ref Range Sodium 141 136 - 145 mmol/L Potassium 4.2 3.5 - 5.1 mmol/L Chloride 100 98 - 107 mmol/L  
 CO2 25 21 - 32 mmol/L Anion gap 16 7 - 16 mmol/L Glucose 118 (H) 65 - 100 mg/dL BUN 23 8 - 23 MG/DL Creatinine 7.81 (H) 0.6 - 1.0 MG/DL  
 GFR est AA 6 (L) >60 ml/min/1.73m2 GFR est non-AA 5 (L) >60 ml/min/1.73m2 Calcium 7.0 (L) 8.3 - 10.4 MG/DL  
CBC W/O DIFF Collection Time: 09/09/18  6:36 AM  
Result Value Ref Range WBC 3.9 (L) 4.3 - 11.1 K/uL  
 RBC 3.72 (L) 4.05 - 5.2 M/uL  
 HGB 12.6 11.7 - 15.4 g/dL HCT 38.8 35.8 - 46.3 % .3 (H) 79.6 - 97.8 FL  
 MCH 33.9 (H) 26.1 - 32.9 PG  
 MCHC 32.5 31.4 - 35.0 g/dL  
 RDW 17.1 % PLATELET 490 (L) 166 - 450 K/uL MPV 11.2 9.4 - 12.3 FL ABSOLUTE NRBC 0.00 0.0 - 0.2 K/uL  
TSH 3RD GENERATION Collection Time: 09/09/18  6:36 AM  
Result Value Ref Range TSH 1.980 0.358 - 3.740 uIU/mL All Micro Results None Current Meds: 
Current Facility-Administered Medications Medication Dose Route Frequency  dilTIAZem (CARDIZEM) IR tablet 30 mg  30 mg Oral TIDAC  heparin (porcine) 1,000 unit/mL injection 5,000 Units  5,000 Units Hemodialysis DIALYSIS PRN  
  alcohol 62% (NOZIN) nasal  1 Ampule  1 Ampule Topical Q12H  
 metoprolol tartrate (LOPRESSOR) tablet 25 mg  25 mg Oral BID  aspirin delayed-release tablet 81 mg  81 mg Oral DAILY  pantoprazole (PROTONIX) tablet 40 mg  40 mg Oral ACB  sevelamer carbonate (RENVELA) tab 1,600 mg  1,600 mg Oral TID WITH MEALS  zolpidem (AMBIEN) tablet 5 mg  5 mg Oral QHS  sodium chloride (NS) flush 5-10 mL  5-10 mL IntraVENous Q8H  
 sodium chloride (NS) flush 5-10 mL  5-10 mL IntraVENous PRN  
 acetaminophen (TYLENOL) tablet 650 mg  650 mg Oral Q4H PRN  
 ondansetron (ZOFRAN) injection 4 mg  4 mg IntraVENous Q6H PRN  
 magnesium hydroxide (MILK OF MAGNESIA) 400 mg/5 mL oral suspension 30 mL  30 mL Oral DAILY PRN  
 heparin (porcine) injection 5,000 Units  5,000 Units SubCUTAneous Q8H Other Studies (last 24 hours): No results found. Assessment and Plan:  
 
Hospital Problems as of 9/8/2018  Date Reviewed: 7/31/2018 Codes Class Noted - Resolved POA Chest wall contusion, right, sequela 
-sig ttp 
-pain control 
-incentive spirom ICD-10-CM: K76.835W 
ICD-9-CM: 906.3  9/8/2018 - Present Yes Hemodialysis-associated hypotension 
-monitor 
-hold BP meds as necessary 
-she is asymptomatic 
-I do not suspect infection ICD-10-CM: I95.3 ICD-9-CM: 458.21  9/8/2018 - Present No  
   
 Hyperkalemia 
-resolved s/p HD ICD-10-CM: E87.5 ICD-9-CM: 276.7  9/7/2018 - Present Unknown Metabolic acidosis 
-resolved ICD-10-CM: E87.2 ICD-9-CM: 276.2  9/7/2018 - Present Unknown ESRD (end stage renal disease) ([facility] 
-On HD ICD-10-CM: N18.6 ICD-9-CM: 585.6  9/7/2018 - Present Unknown ESRD (end stage renal disease) on dialysis ([facility] ICD-10-CM: N18.6, Z99.2 ICD-9-CM: 585.6, V45.11  5/16/2013 - Present Unknown Atrial Fibrillation 
-rate control improved 
-asa for stroke prevention Hypotension: I discussed he case wih neohrology. Cardizem changed to short acting. I decreased her lopressor yest. 
BP improved. Next HD in AM (here) Pt is weak and BP soft. May give fluid bolus but cautious as she is on HD. Do not suspect infection. Pt has right ant rib pain and is weak. Will c/s PT/OT. May need placement. PLAN:   
·  
 
DC planning/Dispo: Antic dc home tomorrow after HD if stable DVT ppx:  Heparin Sub Q Signed: 
Brett Nye MD

## 2018-09-09 NOTE — PROGRESS NOTES
Problem: Falls - Risk of 
Goal: *Absence of Falls Document Faisal Turner Fall Risk and appropriate interventions in the flowsheet. Outcome: Progressing Towards Goal 
Fall Risk Interventions: 
Mobility Interventions: Bed/chair exit alarm Medication Interventions: Bed/chair exit alarm Elimination Interventions: Bed/chair exit alarm History of Falls Interventions: Bed/chair exit alarm

## 2018-09-09 NOTE — PROGRESS NOTES
Patient is lying in bed. Assessment completed. Respirations are even and unlabored. Colostomy bag is intact. Normal saline infusing at 100ml/ hr for low blood pressure. Patient denies any pain. Call light within reach.

## 2018-09-09 NOTE — PROGRESS NOTES
Problem: Mobility Impaired (Adult and Pediatric) Goal: *Acute Goals and Plan of Care (Insert Text) STG: 
(1.)Ms. Long Jones will move from supine to sit and sit to supine  with SUPERVISION within 3 treatment day(s). (2.)Ms. Long Jones will transfer from bed to chair and chair to bed with SUPERVISION using the least restrictive device within 3 treatment day(s). (3.)Ms. Long Jones will ambulate with SUPERVISION for 25 feet with the least restrictive device within 3 treatment day(s). LTG: 
(1.)Ms. Long Jones will move from supine to sit and sit to supine  in bed with MODIFIED INDEPENDENCE within 7 treatment day(s). (2.)Ms. Long Jones will transfer from bed to chair and chair to bed with MODIFIED INDEPENDENCE using the least restrictive device within 7 treatment day(s). (3.)Ms. Long Jones will ambulate with MODIFIED INDEPENDENCE for 150 feet with the least restrictive device within 7 treatment day(s). ________________________________________________________________________________________________ PHYSICAL THERAPY: Initial Assessment, Treatment Day: Day of Assessment 9/9/2018 INPATIENT: Hospital Day: 3 [place] / Product Type: Medicare /  
  
NAME/AGE/GENDER: Irene Pitts is a 80 y.o. female PRIMARY DIAGNOSIS: Hyperkalemia Metabolic acidosis ESRD (end stage renal disease) on dialysis ([facility] [address] Hyperkalemia Metabolic acidosis ESRD (end stage renal disease) ([facility] [address] <principal problem not specified> <principal problem not specified> 
  
  
ICD-10: Treatment Diagnosis:  
 · Generalized Muscle Weakness (M62.81) · Difficulty in walking, Not elsewhere classified (R26.2) Precaution/Allergies: 
Adhesive ASSESSMENT:  
 
Ms. Long Jones presents supine in bed and is agreeable to work with PT. She requires min A to perform bed mobility and sit to stand.   She has some difficulty with static standing balance, requiring min A at times to keep her weight over her feet. During ambulation, she was unable to clear her feet from the floor and was only able to perform short, shuffling steps. She needed min A. At baseline, she lives independent and reports \"furniture walking\" at home. She continues to benefit from skilled PT to improve her mobility and return to PLOF. This section established at most recent assessment PROBLEM LIST (Impairments causing functional limitations): 1. Decreased Strength 2. Decreased ADL/Functional Activities 3. Decreased Transfer Abilities 4. Decreased Ambulation Ability/Technique 5. Decreased Balance 6. Decreased [place] with Home Exercise Program 
 INTERVENTIONS PLANNED: (Benefits and precautions of physical therapy have been discussed with the patient.) 1. Balance Exercise 2. Bed Mobility 3. Family Education 4. Gait Training 5. Home Exercise Program (HEP) 6. Therapeutic Activites 7. Therapeutic Exercise/Strengthening 8. Transfer Training 9. Group Therapy TREATMENT PLAN: Frequency/Duration: 3 times a week for duration of hospital stay Rehabilitation Potential For Stated Goals: Good RECOMMENDED REHABILITATION/EQUIPMENT: (at time of discharge pending progress): Due to the probability of continued deficits (see above) this patient will likely need continued skilled physical therapy after discharge. Equipment:  
? possibly a walker upon discharge based on progress HISTORY:  
History of Present Injury/Illness (Reason for Referral): The patient is an 79 y/o lady with ESRD on HD, Paroxysmal A Fib, CAD, Aortic Stenosis, Dyslipidemia, GERD, Gout, HTN, Crohn's disease s/p colectomy and ileostomy, presented to the ED at [facility] after a fall. The patient actually fell while getting out of the bathtub. She also reports some right chest wall pain, but also some nausea, fatigue, anorexia and generalized weakness.  Of note, her last hemodialysis session was 1 week ago. Labs obtained in the ED were significant for BUN of 112, Cr 18.1, K 6.9, CO2 17, Na 128, AG 23. Nephrology was consulted and advised admission to the ICU for HD at the bedside. The Hospitalist service was then contacted and the patient was admitted for Hyperkalemia and High Anion Gap Metabolic Acidosis in the context of ESRD on HD. Past Medical History/Comorbidities: Ms. Joshua Stephens  has a past medical history of Abdominal pain, chronic, right lower quadrant (5/16/2013); Anticoagulated on Coumadin; Aortic stenosis (06/26/2015); Arthritis; Atrial fibrillation, chronic ([address] (1/14/2016); CAD (coronary artery disease) (06/2013); Carotid stenosis without Infarct (1/19/2016); Chronic anemia (6/26/2013); Chronic kidney disease; Chronic renal failure (1/19/2016); Crohn's disease ([address] Dyslipidemia (6/26/2013); Edema (1/19/2016); ESRD (end stage renal disease) on dialysis ([address] (05/16/2013); Former smoker; GERD (gastroesophageal reflux disease); Gout (6/26/2013); History of ileostomy (5/16/2013); History of peritonitis (2013); History of sepsis (2009); Hypercholesteremia; Hyperlipidemia (1/19/2016); Hypertension; Hypokalemia (5/16/2013); Hypomagnesemia (6/23/2009); Ileostomy in place [facility]); Intractable nausea and vomiting (5/16/2013); Leukocytosis (5/16/2013); NSTEMI (non-ST elevated myocardial infarction) ([address] (6/26/2013); Palpitations (1/19/2016); Paroxysmal atrial fibrillation ([address] (1/19/2016); Paroxysmal tachycardia ([address] (1/19/2016); S/P AAA repair; Serum lipase elevation (5/16/2013); and Tricuspid regurgitation (EF 55-60%). She also has no past medical history of Adverse effect of anesthesia; Difficult intubation; Malignant hyperthermia due to anesthesia; or Pseudocholinesterase deficiency. Ms. Joshua Stephens  has a past surgical history that includes hx cataract removal (Bilateral, 2005); hx lap cholecystectomy (2013); pr abdomen surgery proc unlisted; hx colectomy (1983); hx breast biopsy (Bilateral,  ); vascular surgery procedure unlist (Left, 2009); vascular surgery procedure unlist; vascular surgery procedure unlist (Right, 2014); hx aaa repair (2013); and colonoscopy (N/A, 10/20/2016). Social History/Living Environment:  
Home Environment: Private residence # Steps to Enter: 0 One/Two Story Residence: One story Living Alone: Yes Support Systems: Family member(s) Patient Expects to be Discharged to[de-identified] Private residence Current DME Used/Available at Home: Shower chair (reports having a walker that belonged to her ) Prior Level of Function/Work/Activity: 
Modified independent at baseline Number of Personal Factors/Comorbidities that affect the Plan of Care: 1-2: MODERATE COMPLEXITY EXAMINATION:  
Most Recent Physical Functioning:  
Gross Assessment: 
AROM: Generally decreased, functional 
PROM: Generally decreased, functional 
Strength: Generally decreased, functional 
         
  
Posture: 
Posture (WDL): Exceptions to [facility] Posture Assessment: Forward head, Rounded shoulders, Kyphosis Balance: 
Sitting: Impaired Sitting - Static: Good (unsupported) Sitting - Dynamic: Fair (occasional) Standing: Impaired Standing - Static: Fair Standing - Dynamic : Poor Bed Mobility: 
Rolling: Minimum assistance Supine to Sit: Minimum assistance Scooting: Minimum assistance Wheelchair Mobility: 
  
Transfers: 
Sit to Stand: Minimum assistance Stand to Sit: Minimum assistance Bed to Chair: Minimum assistance Gait: 
  
Base of Support: Widened Speed/Deysi: Shuffled; Slow Gait Abnormalities: Decreased step clearance;Shuffling gait Distance (ft): 5 Feet (ft) Assistive Device:  (hand held assist) Ambulation - Level of Assistance: Minimal assistance Interventions: Safety awareness training; Tactile cues; Verbal cues Body Structures Involved: 1. Digestive Structures 2. Joints 3. Muscles Body Functions Affected: 1.  Neuromusculoskeletal 
 2. Movement Related 3. Digestive Activities and Participation Affected: 1. Mobility 2. Self Care 3. Domestic Life 4. Interpersonal Interactions and Relationships Number of elements that affect the Plan of Care: 4+: HIGH COMPLEXITY CLINICAL PRESENTATION:  
Presentation: Stable and uncomplicated: LOW COMPLEXITY CLINICAL DECISION MAKIN[address] [place] [id number] AM-PAC 6 Clicks Basic Mobility Inpatient Short Form How much difficulty does the patient currently have. .. Unable A Lot A Little None 1. Turning over in bed (including adjusting bedclothes, sheets and blankets)? [] 1   [] 2   [x] 3   [] 4  
2. Sitting down on and standing up from a chair with arms ( e.g., wheelchair, bedside commode, etc.)   [] 1   [] 2   [x] 3   [] 4  
3. Moving from lying on back to sitting on the side of the bed? [] 1   [] 2   [x] 3   [] 4 How much help from another person does the patient currently need. .. Total A Lot A Little None 4. Moving to and from a bed to a chair (including a wheelchair)? [] 1   [] 2   [x] 3   [] 4  
5. Need to walk in hospital room? [] 1   [] 2   [x] 3   [] 4  
6. Climbing 3-5 steps with a railing? [] 1   [x] 2   [] 3   [] 4  
© , Trustees of [address] [place] [id number], under license to [employer]. All rights reserved Score:  Initial: 17 Most Recent: X (Date: -- ) Interpretation of Tool:  Represents activities that are increasingly more difficult (i.e. Bed mobility, Transfers, Gait). Score 24 23 22-20 19-15 14-10 9-7 6 Modifier CH CI CJ CK CL CM CN   
 
? Mobility - Walking and Moving Around:  
  - CURRENT STATUS: CK - 40%-59% impaired, limited or restricted  - GOAL STATUS: CJ - 20%-39% impaired, limited or restricted  - D/C STATUS:  ---------------To be determined--------------- [place] / [place] / Product Type: Medicare /   
 
Medical Necessity: · Patient is expected to demonstrate progress in strength, balance and functional technique to improve safety during ADLs and IADLs. Reason for Services/Other Comments: 
· Patient continues to require modification of therapeutic interventions to increase complexity of exercises. Use of outcome tool(s) and clinical judgement create a POC that gives a: Clear prediction of patient's progress: LOW COMPLEXITY  
  
 
 
 
TREATMENT:  
(In addition to Assessment/Re-Assessment sessions the following treatments were rendered) Pre-treatment Symptoms/Complaints:  No pain at rest.  Some pain with movement, eager to get OOB Pain: Initial:  
Pain Intensity 1: 0  Post Session:  0/10 Therapeutic Activity: (    10 minutes): Therapeutic activities including Bed transfers, Chair transfers, Ambulation on level ground and static standing balance to improve mobility, strength and balance. Required moderate Safety awareness training; Tactile cues; Verbal cues to promote static and dynamic balance in standing. Braces/Orthotics/Lines/Etc:  
· O2 Device: Room air Treatment/Session Assessment:   
· Response to Treatment:  Patient left up in bedside chair with needs in reach. No compliants · Interdisciplinary Collaboration:  
o Registered Nurse · After treatment position/precautions:  
o Up in chair 
o Bed alarm/tab alert on 
o Bed/Chair-wheels locked 
o RN notified · Compliance with Program/Exercises: Will assess as treatment progresses. · Recommendations/Intent for next treatment session: \"Next visit will focus on advancements to more challenging activities and reduction in assistance provided\". Total Treatment Duration: PT Patient Time In/Time Out Time In: 2898 Time Out: 1015 Vashti Cuh DPT

## 2018-09-10 ENCOUNTER — HOME HEALTH ADMISSION (OUTPATIENT)
Dept: HOME HEALTH SERVICES | Facility: HOME HEALTH | Age: 83
End: 2018-09-10

## 2018-09-10 VITALS
BODY MASS INDEX: 25.87 KG/M2 | RESPIRATION RATE: 19 BRPM | HEIGHT: 63 IN | HEART RATE: 109 BPM | TEMPERATURE: 98 F | SYSTOLIC BLOOD PRESSURE: 115 MMHG | WEIGHT: 146 LBS | OXYGEN SATURATION: 97 % | DIASTOLIC BLOOD PRESSURE: 76 MMHG

## 2018-09-10 LAB
ANION GAP SERPL CALC-SCNC: 17 MMOL/L (ref 7–16)
BASOPHILS # BLD: 0 K/UL (ref 0–0.2)
BASOPHILS NFR BLD: 0 % (ref 0–2)
BUN SERPL-MCNC: 28 MG/DL (ref 8–23)
CALCIUM SERPL-MCNC: 7.4 MG/DL (ref 8.3–10.4)
CHLORIDE SERPL-SCNC: 99 MMOL/L (ref 98–107)
CO2 SERPL-SCNC: 23 MMOL/L (ref 21–32)
CREAT SERPL-MCNC: 9.74 MG/DL (ref 0.6–1)
DIFFERENTIAL METHOD BLD: ABNORMAL
EOSINOPHIL # BLD: 0.1 K/UL (ref 0–0.8)
EOSINOPHIL NFR BLD: 1 % (ref 0.5–7.8)
ERYTHROCYTE [DISTWIDTH] IN BLOOD BY AUTOMATED COUNT: 16.9 %
GLUCOSE SERPL-MCNC: 93 MG/DL (ref 65–100)
HCT VFR BLD AUTO: 37.9 % (ref 35.8–46.3)
HGB BLD-MCNC: 12.5 G/DL (ref 11.7–15.4)
IMM GRANULOCYTES # BLD: 0.1 K/UL (ref 0–0.5)
IMM GRANULOCYTES NFR BLD AUTO: 2 % (ref 0–5)
LYMPHOCYTES # BLD: 0.9 K/UL (ref 0.5–4.6)
LYMPHOCYTES NFR BLD: 21 % (ref 13–44)
MCH RBC QN AUTO: 34.2 PG (ref 26.1–32.9)
MCHC RBC AUTO-ENTMCNC: 33 G/DL (ref 31.4–35)
MCV RBC AUTO: 103.8 FL (ref 79.6–97.8)
MONOCYTES # BLD: 0.4 K/UL (ref 0.1–1.3)
MONOCYTES NFR BLD: 10 % (ref 4–12)
NEUTS SEG # BLD: 3 K/UL (ref 1.7–8.2)
NEUTS SEG NFR BLD: 67 % (ref 43–78)
NRBC # BLD: 0 K/UL (ref 0–0.2)
PLATELET # BLD AUTO: 129 K/UL (ref 150–450)
PMV BLD AUTO: 11.2 FL (ref 9.4–12.3)
POTASSIUM SERPL-SCNC: 4.5 MMOL/L (ref 3.5–5.1)
RBC # BLD AUTO: 3.65 M/UL (ref 4.05–5.2)
SODIUM SERPL-SCNC: 139 MMOL/L (ref 136–145)
WBC # BLD AUTO: 4.5 K/UL (ref 4.3–11.1)

## 2018-09-10 PROCEDURE — 74011250636 HC RX REV CODE- 250/636: Performed by: INTERNAL MEDICINE

## 2018-09-10 PROCEDURE — 74011250637 HC RX REV CODE- 250/637: Performed by: INTERNAL MEDICINE

## 2018-09-10 PROCEDURE — 80048 BASIC METABOLIC PNL TOTAL CA: CPT

## 2018-09-10 PROCEDURE — 36415 COLL VENOUS BLD VENIPUNCTURE: CPT

## 2018-09-10 PROCEDURE — 77030027138 HC INCENT SPIROMETER -A

## 2018-09-10 PROCEDURE — 97166 OT EVAL MOD COMPLEX 45 MIN: CPT

## 2018-09-10 PROCEDURE — 85025 COMPLETE CBC W/AUTO DIFF WBC: CPT

## 2018-09-10 PROCEDURE — 97535 SELF CARE MNGMENT TRAINING: CPT

## 2018-09-10 PROCEDURE — 90935 HEMODIALYSIS ONE EVALUATION: CPT

## 2018-09-10 RX ORDER — DILTIAZEM HYDROCHLORIDE 60 MG/1
60 TABLET, FILM COATED ORAL
Qty: 90 TAB | Refills: 0 | Status: SHIPPED | OUTPATIENT
Start: 2018-09-10

## 2018-09-10 RX ORDER — DILTIAZEM HYDROCHLORIDE 60 MG/1
60 TABLET, FILM COATED ORAL
Status: DISCONTINUED | OUTPATIENT
Start: 2018-09-10 | End: 2018-09-10 | Stop reason: HOSPADM

## 2018-09-10 RX ORDER — ACETAMINOPHEN 325 MG/1
325 TABLET ORAL
Qty: 40 TAB | Refills: 0 | Status: SHIPPED | OUTPATIENT
Start: 2018-09-10

## 2018-09-10 RX ORDER — METOPROLOL TARTRATE 50 MG/1
25 TABLET ORAL 2 TIMES DAILY
Qty: 60 TAB | Refills: 1 | Status: SHIPPED | OUTPATIENT
Start: 2018-09-10

## 2018-09-10 RX ADMIN — Medication 1 AMPULE: at 10:46

## 2018-09-10 RX ADMIN — DILTIAZEM HYDROCHLORIDE 30 MG: 30 TABLET, FILM COATED ORAL at 10:46

## 2018-09-10 RX ADMIN — Medication 10 ML: at 05:24

## 2018-09-10 RX ADMIN — METOPROLOL TARTRATE 25 MG: 25 TABLET ORAL at 10:45

## 2018-09-10 RX ADMIN — HEPARIN SODIUM 5000 UNITS: 5000 INJECTION INTRAVENOUS; SUBCUTANEOUS at 05:22

## 2018-09-10 RX ADMIN — SEVELAMER CARBONATE 1600 MG: 800 TABLET, FILM COATED ORAL at 10:45

## 2018-09-10 RX ADMIN — PANTOPRAZOLE SODIUM 40 MG: 40 TABLET, DELAYED RELEASE ORAL at 05:22

## 2018-09-10 RX ADMIN — ASPIRIN 81 MG: 81 TABLET, COATED ORAL at 10:44

## 2018-09-10 RX ADMIN — DILTIAZEM HYDROCHLORIDE 30 MG: 30 TABLET, FILM COATED ORAL at 05:22

## 2018-09-10 NOTE — PROGRESS NOTES
Care Management Interventions PCP Verified by CM: No 
Transition of Care Consult (CM Consult): Home Health [facility] - INPATIENT: Yes Physical Therapy Consult: Yes Occupational Therapy Consult: Yes Current Support Network: Lives Alone Plan discussed with Pt/Family/Caregiver: Yes Freedom of Choice Offered: Yes Discharge Location Discharge Placement: Home with home health Patient is known to this CM from her last admission. She lives alone. PTA she was independent with ambulation and ADLs. She realizes  now that she needs to have caregivers in the home and plans on hiring two women that live in her neighborhood. Patient's pcp moved so CM will get patient assigned another pcp with a follow up appt. No history of HH or STR. PT/OT consulted. Patient's daughter takes patient to HD. She didn't go to her HD appts last week because she stated she wasn't feeling well. CM following.

## 2018-09-10 NOTE — PROGRESS NOTES
[facility] NURSE PROGRESS REPORT SUBJECTIVE: Called to assess patient secondary to nurse concern. MEWS Score: 3 (09/09/18 1100) Vitals:  
 09/09/18 0700 09/09/18 1100 09/09/18 1500 09/09/18 1919 BP: 95/59 97/59 105/67 101/62 Pulse: (!) 108 (!) 108 (!) 105 (!) 106 Resp: 16 16 18 18 Temp: 97.4 °F (36.3 °C) 97.6 °F (36.4 °C) 97.5 °F (36.4 °C) 98 °F (36.7 °C) SpO2: 94% 94% 95% 93% Weight:      
Height:      
  
EKG: unchanged from previous tracings, atrial fibrillation, rate 102. LAB DATA: 
 
Recent Labs  
   09/09/18 0636 09/08/18 
 0650  09/07/18 
 2145   09/07/18 
 1229 NA  141  140   --    --   128* K  4.2  4.9  4.1   < >  6.9*  
CL  100  99   --    --   88* CO2  25  26   --    --   17* AGAP  16  15   --    --   23* GLU  118*  103*   --    --   115* BUN  23  51*   --    --   112* CREA  7.81*  11.00*   --    --   18.10* GFRAA  6*  4*   --    --   2*  
GFRNA  5*  4*   --    --   2*  
CA  7.0*  7.4*   --    --   7.8*  
MG   --   2.0   --    --    --   
PHOS   --   6.1*   --    --    --   
ALB   --    --    --    --   4.1 TP   --    --    --    --   8.7*  
GLOB   --    --    --    --   4.6* AGRAT   --    --    --    --   0.9* ALT   --    --    --    --   27  
 < > = values in this interval not displayed. Recent Labs  
   09/09/18 0636 09/08/18 
 0650  09/07/18 
 1229 WBC  3.9*  5.1  12.5* HGB  12.6  13.2  15.7* HCT  38.8  39.0  46.6*  
PLT  133*  143*  219 OBJECTIVE: On arrival to room, I found patient to be resting comfortably. Pain Assessment Pain Intensity 1: 0 (09/09/18 1530) Pain Location 1: Abdomen Pain Intervention(s) 1: Medication (see MAR) Patient Stated Pain Goal: 0 
 
 
ASSESSMENT:  Patient denies any chest pain and is hopeful to go home tomorrow. No concerns at this time. PLAN:  Outreach follow up completed per protocol. Please call if any concerns.

## 2018-09-10 NOTE — PROGRESS NOTES
Problem: Self Care Deficits Care Plan (Adult) Goal: *Acute Goals and Plan of Care (Insert Text) 1. Patient will complete lower body bathing and dressing with CGA and adaptive equipment as needed. 2. Patient will complete toileting with SBA. 3. Patient will tolerate 23 minutes of OT treatment with less than 4 rest breaks to increase activity tolerance for ADLs. 4. Patient will complete functional transfers with SBA and adaptive equipment as needed. Timeframe: 7 visits Comments: OCCUPATIONAL THERAPY: Initial Assessment, Daily Note, Treatment Day: Day of Assessment and 1st and AM 9/10/2018 INPATIENT: Hospital Day: 4 [place] / Product Type: Medicare /  
  
NAME/AGE/GENDER: David Wasserman is a 80 y.o. female PRIMARY DIAGNOSIS:  Hyperkalemia Metabolic acidosis ESRD (end stage renal disease) on dialysis ([facility] Hyperkalemia Metabolic acidosis ESRD (end stage renal disease) ([facility] <principal problem not specified> <principal problem not specified> 
  
  
ICD-10: Treatment Diagnosis:  
 · Generalized Muscle Weakness (M62.81) · Other lack of cordination (R27.8) Precautions/Allergies: 
  fall risk Adhesive ASSESSMENT:  
 
Ms. Keshia Grullon presents to hospital for above. Pt lives alone in a one-level home, where she is typically independent to mod I with ADLs/IADLs, including some driving. Pt denies use of AD/DME at baseline for functional mobility; pt endorses 0 falls in the last 6 months. Today, pt is found supine in bed upon arrival, AOx4, and agreeable to OT evaluation. Per BUE screen, AROM, strength, and coordination are generally decreased (4-/5). Pt able to move supine to sit with min A, demonstrating good to fair sitting balance at EOB. Pt completes STS with min A and RW and completes functional transfer with min A and RW, demonstrating fair to poor balance in standing.  Once seated in chair, pt completes full body bathing/dressing. Pt requires min A for UB bathing/dressing and max A for lower body bathing/dressing. Pt left seated in chair with chair alarm on and  present. Pt left with possessions in reach and all needs met. Ms. Long Jones presents with functional limitations listed below and appears to be functioning below baseline. They will benefit from continued skilled OT services to maximize safety and independence with ADLs. Will follow during acute stay. This section established at most recent assessment PROBLEM LIST (Impairments causing functional limitations): 1. Decreased Strength 2. Decreased ADL/Functional Activities 3. Decreased Transfer Abilities 4. Decreased Ambulation Ability/Technique 5. Decreased Balance 6. Decreased Activity Tolerance 7. Decreased Work Simplification/Energy Conservation Techniques INTERVENTIONS PLANNED: (Benefits and precautions of occupational therapy have been discussed with the patient.) 1. Activities of daily living training 2. Adaptive equipment training 3. Balance training 4. Clothing management 5. Donning&doffing training 6. Therapeutic activity 7. Therapeutic exercise TREATMENT PLAN: Frequency/Duration: Follow patient 3x/week to address above goals. Rehabilitation Potential For Stated Goals: Good RECOMMENDED REHABILITATION/EQUIPMENT: (at time of discharge pending progress): Due to the probability of continued deficits (see above) this patient will likely need continued skilled occupational therapy after discharge. Equipment:  
? None at this time OCCUPATIONAL PROFILE AND HISTORY:  
History of Present Injury/Illness (Reason for Referral): 
See H&P Past Medical History/Comorbidities: Ms. Long Jones  has a past medical history of Abdominal pain, chronic, right lower quadrant (5/16/2013); Anticoagulated on Coumadin; Aortic stenosis (06/26/2015); Arthritis;  Atrial fibrillation, chronic ([facility] (1/14/2016); CAD (coronary artery disease) (06/2013); Carotid stenosis without Infarct (1/19/2016); Chronic anemia (6/26/2013); Chronic kidney disease; Chronic renal failure (1/19/2016); Crohn's disease ([facility] Dyslipidemia (6/26/2013); Edema (1/19/2016); ESRD (end stage renal disease) on dialysis ([facility] (05/16/2013); Former smoker; GERD (gastroesophageal reflux disease); Gout (6/26/2013); History of ileostomy (5/16/2013); History of peritonitis (2013); History of sepsis (2009); Hypercholesteremia; Hyperlipidemia (1/19/2016); Hypertension; Hypokalemia (5/16/2013); Hypomagnesemia (6/23/2009); Ileostomy in place [facility]); Intractable nausea and vomiting (5/16/2013); Leukocytosis (5/16/2013); NSTEMI (non-ST elevated myocardial infarction) ([facility] (6/26/2013); Palpitations (1/19/2016); Paroxysmal atrial fibrillation ([facility] (1/19/2016); Paroxysmal tachycardia ([facility] (1/19/2016); S/P AAA repair; Serum lipase elevation (5/16/2013); and Tricuspid regurgitation (EF 55-60%). She also has no past medical history of Adverse effect of anesthesia; Difficult intubation; Malignant hyperthermia due to anesthesia; or Pseudocholinesterase deficiency. Ms. Geno Caballero  has a past surgical history that includes hx cataract removal (Bilateral, 2005); hx lap cholecystectomy (2013); pr abdomen surgery proc unlisted; hx colectomy (1983); hx breast biopsy (Bilateral,  ); vascular surgery procedure unlist (Left, 2009); vascular surgery procedure unlist; vascular surgery procedure unlist (Right, 2014); hx aaa repair (2013); and colonoscopy (N/A, 10/20/2016). Social History/Living Environment:  
Home Environment: Private residence # Steps to Enter: 0 One/Two Story Residence: One story Living Alone: Yes Support Systems: Family member(s) Patient Expects to be Discharged to[de-identified] Private residence Current DME Used/Available at Home: Shower chair (reports having a walker that belonged to her ) Prior Level of Function/Work/Activity: [place] to mod I with ADLs. Drives. Personal Factors:   
      Sex:  female Age:  80 y.o. Number of Personal Factors/Comorbidities that affect the Plan of Care: Expanded review of therapy/medical records (1-2):  MODERATE COMPLEXITY ASSESSMENT OF OCCUPATIONAL PERFORMANCE[de-identified]  
Activities of Daily Living:  
Basic ADLs (From Assessment) Complex ADLs (From Assessment) Feeding: Setup Oral Facial Hygiene/Grooming: Minimum assistance Bathing: Moderate assistance Upper Body Dressing: Minimum assistance Lower Body Dressing: Moderate assistance Toileting: Minimum assistance Grooming/Bathing/Dressing Activities of Daily Living Cognitive Retraining Safety/Judgement: Awareness of environment; Fall prevention;Driving appropriateness Bed/Mat Mobility Supine to Sit: Minimum assistance Sit to Stand: Minimum assistance Bed to Chair: Minimum assistance Scooting: Minimum assistance Most Recent Physical Functioning:  
Gross Assessment: 
AROM: Generally decreased, functional 
Strength: Generally decreased, functional 
         
  
Posture: 
Posture (WDL): Exceptions to [facility] Posture Assessment: Forward head, Rounded shoulders, Kyphosis Balance: 
Sitting: Impaired Sitting - Static: Good (unsupported) Sitting - Dynamic: Fair (occasional) Standing: Impaired Standing - Static: Fair Standing - Dynamic : Poor Bed Mobility: 
Supine to Sit: Minimum assistance Scooting: Minimum assistance Wheelchair Mobility: 
  
Transfers: 
Sit to Stand: Minimum assistance Stand to Sit: Minimum assistance Bed to Chair: Minimum assistance Patient Vitals for the past 6 hrs: 
 BP SpO2 Pulse 09/10/18 0727 114/63 - 100  
09/10/18 0758 106/62 - 98  
09/10/18 0818 95/52 - (!) 108  
09/10/18 0832 106/57 - (!) 113  
09/10/18 0856 90/61 - 74  
09/10/18 0932 109/54 - (!) 108  
09/10/18 0956 139/86 - (!) 108  
09/10/18 1044 103/57 - (!) 108  
09/10/18 1058 105/63 97 % (!) 114  
 
 
 Mental Status Neurologic State: Alert Orientation Level: Oriented X4 Cognition: Follows commands Perception: Appears intact Perseveration: No perseveration noted Safety/Judgement: Awareness of environment, Fall prevention, Driving appropriateness Physical Skills Involved: 
1. Balance 2. Strength 3. Activity Tolerance Cognitive Skills Affected (resulting in the inability to perform in a timely and safe manner): 
1. n/a Psychosocial Skills Affected: 1. Habits/Routines 2. Environmental Adaptation 3. Social Roles Number of elements that affect the Plan of Care: 5+:  HIGH COMPLEXITY CLINICAL DECISION MAKIN[address] [place] [address] AM-PAC 6 Clicks Daily Activity Inpatient Short Form How much help from another person does the patient currently need. .. Total A Lot A Little None 1. Putting on and taking off regular lower body clothing? [] 1   [x] 2   [] 3   [] 4  
2. Bathing (including washing, rinsing, drying)? [] 1   [x] 2   [] 3   [] 4  
3. Toileting, which includes using toilet, bedpan or urinal?   [] 1   [x] 2   [] 3   [] 4  
4. Putting on and taking off regular upper body clothing? [] 1   [] 2   [x] 3   [] 4  
5. Taking care of personal grooming such as brushing teeth? [] 1   [] 2   [x] 3   [] 4  
6. Eating meals? [] 1   [] 2   [] 3   [x] 4  
© , Trustees of [address] [place] [address], under license to [employer]. All rights reserved Score:  Initial: 16 Most Recent: X (Date: -- ) Interpretation of Tool:  Represents activities that are increasingly more difficult (i.e. Bed mobility, Transfers, Gait). Score 24 23 22-20 19-15 14-10 9-7 6 Modifier CH CI CJ CK CL CM CN   
 
? Self Care:  
  - CURRENT STATUS: CK - 40%-59% impaired, limited or restricted  - GOAL STATUS: CI - 1%-19% impaired, limited or restricted   - D/C STATUS:  ---------------To be determined--------------- 
 [place] / [place] B / Product Type: Medicare /   
 
Medical Necessity:    
· Patient is expected to demonstrate progress in strength, balance, coordination and functional technique to increase independence with ADLs. Reason for Services/Other Comments: 
· Patient continues to require skilled intervention due to medical complications. Use of outcome tool(s) and clinical judgement create a POC that gives a: MODERATE COMPLEXITY  
 
 
 
TREATMENT:  
(In addition to Assessment/Re-Assessment sessions the following treatments were rendered) Pre-treatment Symptoms/Complaints:   
Pain: Initial:  
Pain Intensity 1: 0  Post Session:  same Self Care: (23 minutes): Procedure(s) (per grid) utilized to improve and/or restore self-care/home management as related to dressing, bathing and grooming. Required moderate verbal, manual and tactile cueing to facilitate activities of daily living skills and compensatory activities. Braces/Orthotics/Lines/Etc:  
· O2 Device: Room air Treatment/Session Assessment:   
· Response to Treatment:  Tolerated well · Interdisciplinary Collaboration:  
o Occupational Therapist 
o Registered Nurse 
o  
o Certified Nursing Assistant/Patient Care Technician · After treatment position/precautions:  
o Up in chair 
o Bed alarm/tab alert on 
o Bed/Chair-wheels locked 
o Call light within reach 
o RN notified · Compliance with Program/Exercises: compliant all of the time. · Recommendations/Intent for next treatment session: \"Next visit will focus on advancements to more challenging activities and reduction in assistance provided\". Total Treatment Duration: OT Patient Time In/Time Out Time In: 1055 Time Out: 1130 [facility]

## 2018-09-10 NOTE — PROGRESS NOTES
[address] Face to Face Encounter Patients Name: Satnam Garcia    YOB: 1933 Primary Diagnosis: hyperkalemia Physician: Dr. Shirley Langston Date of Face to Face:   9/10/18 Face to Face Encounter findings are related to primary reason for home care:   yes 1. I certify that the patient needs intermittent skilled nursing care, physical therapy and/or speech therapy. I will not be following this patient in the Community and Dr. Galo Watson will be responsible for signing the [address] of Care. 2. Initial Orders for Care: Must be completed only if Face to Face MD will not be signing the [address] Skilled Nursing and Physical Therapy 3. I certify that this patient is homebound for the following reason(s): Requires considerable and taxing effort to leave the home 4. I certify that this patient is under my care and that I, or a nurse practitioner or  [id number] working with me, had a Face-to-Face Encounter that meets the physician Face-to-Face Encounter requirements. Document the physical findings from the Face-to-Face Encounter that support the need for skilled services: Has new medications that requires skilled nursing teaching and monitoring for understanding and compliance Praveen Choe 9/10/2018

## 2018-09-10 NOTE — DIALYSIS
TRANSFER IN - DIALYSIS Received patient in dialysis unit from [facility] (unit) for ordered procedure. Consent verified for renal replacement therapy. Patient alert and orientated and vital signs stable. /64 P81 Hemodialysis initiated using right upper arm AVF and 15 g needles. Machine settings per MD order. Will monitor during treatment.

## 2018-09-10 NOTE — PROGRESS NOTES
Patient is lying in bed. Assessment completed. Respirations are even and unlabored. Patient denies any pain. Colostomy bag changed. Redness noted on the sited. No distress at this time. safety measures in place.

## 2018-09-10 NOTE — DISCHARGE SUMMARY
Hospitalist Discharge Summary     Admit Date:  2018  1:02 PM   Name:  Maricel Devries   Age:  80 y.o.  :  3/14/1933   MRN:  [id number]   PCP:  None  Treatment Team: Attending Provider: Jacki Jenkins MD; Consulting Provider: Neal Barbosa MD; Care Manager: Crow Vides. Jane Beauchamp    Problem List for this Hospitalization:  Hospital Problems as of 9/10/2018  Date Reviewed: 2018          Codes Class Noted - Resolved POA    Chest wall contusion, right, sequela ICD-10-CM: S20.211S  ICD-9-CM: 906.3  2018 - Present Yes        Hemodialysis-associated hypotension ICD-10-CM: I95.3  ICD-9-CM: 458.21  2018 - Present No        Hyperkalemia ICD-10-CM: E87.5  ICD-9-CM: 276.7  2018 - Present Yes        Metabolic acidosis HJX-80-YB: E87.2  ICD-9-CM: 276.2  2018 - Present Yes        ESRD (end stage renal disease) ([facility] ICD-10-CM: N18.6  ICD-9-CM: 585.6  2018 - Present Yes        Atrial fibrillation, chronic (HCC) ICD-10-CM: I48.2  ICD-9-CM: 427.31  2016 - Present Yes        ESRD (end stage renal disease) on dialysis [facility]) ICD-10-CM: N18.6, Z99.2  ICD-9-CM: 585.6, V45.11  2013 - Present Yes            Hospital Course: The patient is an 81 y/o lady with ESRD on HD, Paroxysmal A Fib, CAD, Aortic Stenosis, Dyslipidemia, GERD, Gout, HTN, Crohn's disease s/p colectomy and ileostomy, presented to the ED at [facility] after a fall. The patient actually fell while getting out of the bathtub. She also reports some right chest wall pain, but also some nausea, fatigue, anorexia and generalized weakness. Of note, her last hemodialysis session was 1 week ago. Labs obtained in the ED were significant for BUN of 112, Cr 18.1, K 6.9, CO2 17, Na 128, AG 23. Nephrology was consulted and advised admission to the ICU for HD at the bedside.  The Hospitalist service was then contacted and the patient was admitted for Hyperkalemia and High Anion Gap Metabolic Acidosis in the context of ESRD on HD.      9/10: Returned from [employer] and states she feels wwell and wantss to go home. BP has improved since reducing lopressor and changing po cardizem to short acting. Unfortunately her HR increased so Cardizem increased from 30 mg po tid to 60 mg po tid. Will observe for a few more hours to ensure improved HR control prior to dc. Pt seen by PT and OT and felt self for dc with home health. PT still has mild right rib cage pain but overall improved. Condition on dc stable. Follow up instructions below. Plan was discussed with pt. All questions answered. Patient was stable at time of discharge and was instructed to call or return if there are any concerns or recurrence of symptoms. Diagnostic Imaging/Tests:   Xr Chest Pa Lat    Result Date: 9/7/2018  Chest 2 view dated 9/7/2018 Prior 7/31/2018 Confirmation: Chest pain Lungs are poorly expanded but clear. Vascularity normal. No pleural effusion or pneumothorax. Heart is enlarged, mediastinum unremarkable. IMPRESSION: No acute abnormality      Echocardiogram results:  No results found for this visit on 09/07/18.       All Micro Results     None          Labs: Results:       BMP, Mg, Phos Recent Labs      09/10/18   0614  09/09/18   0636  09/08/18   0650   NA  139  141  140   K  4.5  4.2  4.9   CL  99  100  99   CO2  23  25  26   AGAP  17*  16  15   BUN  28*  23  51*   CREA  9.74*  7.81*  11.00*   CA  7.4*  7.0*  7.4*   GLU  93  118*  103*   MG   --    --   2.0   PHOS   --    --   6.1*      CBC Recent Labs      09/10/18   0614  09/09/18   0636  09/08/18   0650  09/07/18   1229   WBC  4.5  3.9*  5.1  12.5*   RBC  3.65*  3.72*  3.81*  4.62   HGB  12.5  12.6  13.2  15.7*   HCT  37.9  38.8  39.0  46.6*   PLT  129*  133*  143*  219   GRANS  67   --    --   78   LYMPH  21   --    --   15   EOS  1   --    --   0*   MONOS  10   --    --   7   BASOS  0   --    --   0   IG  2   --    --   1   ANEU  3.0   --    --   9.8*   ABL  0.9   --    --   1.8   DEANGELO  0.1   --    --   0.0 ABM  0.4   --    --   0.8   ABB  0.0   --    --   0.0   AIG  0.1   --    --   0.1      LFT Recent Labs      09/07/18   1229   SGOT  14*   ALT  27   AP  76   TP  8.7*   ALB  4.1   GLOB  4.6*   AGRAT  0.9*      Cardiac Testing Lab Results   Component Value Date/Time     07/30/2018 11:02 AM     07/08/2013 01:33 PM    Troponin-I, Qt. 0.10 (HH) 07/31/2018 03:39 AM    Troponin-I, Qt. 0.10 (HH) 07/30/2018 11:43 PM    Troponin-I, Qt. 0.08 (H) 07/30/2018 11:02 AM      Coagulation Tests Lab Results   Component Value Date/Time    Prothrombin time 15.2 (H) 09/07/2018 02:12 PM    Prothrombin time 10.8 07/08/2013 01:33 PM    Prothrombin time 11.9 (H) 05/15/2013 04:15 PM    INR 1.3 09/07/2018 02:12 PM    INR 1.0 07/08/2013 01:33 PM    INR 1.1 05/15/2013 04:15 PM    INR, External 0 03/29/2016    INR POC 4.7 08/17/2017 09:10 AM    INR POC 2.0 08/03/2017 09:12 AM    INR POC 1.3 07/27/2017 08:35 AM    aPTT 23.2 (L) 06/27/2013 03:10 PM    aPTT 39.9 (H) 06/27/2013 07:59 AM    aPTT 33.4 (H) 06/27/2013 01:00 AM      A1c No results found for: HBA1C, HGBE8, QFU4GIPH   Lipid Panel Lab Results   Component Value Date/Time    Cholesterol, total 112 07/31/2018 03:39 AM    HDL Cholesterol 29 (L) 07/31/2018 03:39 AM    LDL, calculated 46.4 07/31/2018 03:39 AM    VLDL, calculated 36.6 (H) 07/31/2018 03:39 AM    Triglyceride 183 (H) 07/31/2018 03:39 AM    CHOL/HDL Ratio 3.9 07/31/2018 03:39 AM      Thyroid Panel Lab Results   Component Value Date/Time    TSH 1.980 09/09/2018 06:36 AM    TSH 1.900 07/08/2013 01:33 PM        Most Recent UA Lab Results   Component Value Date/Time    Color YELLOW 06/20/2009 01:15 PM    Appearance CLEAR 06/20/2009 01:15 PM    pH (UA) 6.0 06/20/2009 01:15 PM    Protein TRACE (A) 06/20/2009 01:15 PM    Glucose NEGATIVE  06/20/2009 01:15 PM    Ketone NEGATIVE  06/20/2009 01:15 PM    Bilirubin NEGATIVE  06/20/2009 01:15 PM    Blood NEGATIVE  06/20/2009 01:15 PM    Urobilinogen 0.2 06/20/2009 01:15 PM Nitrites NEGATIVE  06/20/2009 01:15 PM    Leukocyte Esterase NEGATIVE  06/20/2009 01:15 PM        Allergies   Allergen Reactions    Adhesive Other (comments)     Skin tears       There is no immunization history for the selected administration types on file for this patient. All Labs from Last 24 Hrs:  Recent Results (from the past 24 hour(s))   CBC WITH AUTOMATED DIFF    Collection Time: 09/10/18  6:14 AM   Result Value Ref Range    WBC 4.5 4.3 - 11.1 K/uL    RBC 3.65 (L) 4.05 - 5.2 M/uL    HGB 12.5 11.7 - 15.4 g/dL    HCT 37.9 35.8 - 46.3 %    .8 (H) 79.6 - 97.8 FL    MCH 34.2 (H) 26.1 - 32.9 PG    MCHC 33.0 31.4 - 35.0 g/dL    RDW 16.9 %    PLATELET 505 (L) 909 - 450 K/uL    MPV 11.2 9.4 - 12.3 FL    ABSOLUTE NRBC 0.00 0.0 - 0.2 K/uL    DF AUTOMATED      NEUTROPHILS 67 43 - 78 %    LYMPHOCYTES 21 13 - 44 %    MONOCYTES 10 4.0 - 12.0 %    EOSINOPHILS 1 0.5 - 7.8 %    BASOPHILS 0 0.0 - 2.0 %    IMMATURE GRANULOCYTES 2 0.0 - 5.0 %    ABS. NEUTROPHILS 3.0 1.7 - 8.2 K/UL    ABS. LYMPHOCYTES 0.9 0.5 - 4.6 K/UL    ABS. MONOCYTES 0.4 0.1 - 1.3 K/UL    ABS. EOSINOPHILS 0.1 0.0 - 0.8 K/UL    ABS. BASOPHILS 0.0 0.0 - 0.2 K/UL    ABS. IMM.  GRANS. 0.1 0.0 - 0.5 K/UL   METABOLIC PANEL, BASIC    Collection Time: 09/10/18  6:14 AM   Result Value Ref Range    Sodium 139 136 - 145 mmol/L    Potassium 4.5 3.5 - 5.1 mmol/L    Chloride 99 98 - 107 mmol/L    CO2 23 21 - 32 mmol/L    Anion gap 17 (H) 7 - 16 mmol/L    Glucose 93 65 - 100 mg/dL    BUN 28 (H) 8 - 23 MG/DL    Creatinine 9.74 (H) 0.6 - 1.0 MG/DL    GFR est AA 5 (L) >60 ml/min/1.73m2    GFR est non-AA 4 (L) >60 ml/min/1.73m2    Calcium 7.4 (L) 8.3 - 10.4 MG/DL       Discharge Exam:  Patient Vitals for the past 24 hrs:   Temp Pulse Resp BP SpO2   09/10/18 1058 97.9 °F (36.6 °C) (!) 114 22 105/63 97 %   09/10/18 1044 - (!) 108 - 103/57 -   09/10/18 0956 - (!) 108 - 139/86 -   09/10/18 0932 - (!) 108 - 109/54 -   09/10/18 0856 - 74 - 90/61 -   09/10/18 1005 - (!) 113 - 106/57 -   09/10/18 0818 - (!) 108 - 95/52 -   09/10/18 0758 - 98 - 106/62 -   09/10/18 0727 - 100 - 114/63 -   09/10/18 0705 - 81 - 119/64 -   09/10/18 0301 97.8 °F (36.6 °C) 92 18 116/71 95 %   09/09/18 2305 97.5 °F (36.4 °C) (!) 107 18 103/67 96 %   09/09/18 1919 98 °F (36.7 °C) (!) 106 18 101/62 93 %   09/09/18 1500 97.5 °F (36.4 °C) (!) 105 18 105/67 95 %     Oxygen Therapy  O2 Sat (%): 97 % (09/10/18 1058)  Pulse via Oximetry: 90 beats per minute (09/07/18 1636)  O2 Device: Room air (09/07/18 1218)    Intake/Output Summary (Last 24 hours) at 09/10/18 1220  Last data filed at 09/10/18 0956   Gross per 24 hour   Intake              200 ml   Output              500 ml   Net             -300 ml       General:    Well nourished. Alert. No distress. Eyes:   Normal sclera. Extraocular movements intact. ENT:  Normocephalic, atraumatic. Moist mucous membranes  CV:   IRR, mildly tachycardic. No murmur, rub, or   gallop. Lungs:  Clear to auscultation bilaterally. No    wheezing, rhonchi, or rales. .  TTP right   lower ant ribs cage, no crepitance. Abdomen: Soft, nontender, nondistended. Bowel   sounds normal.   Extremities: Warm and dry. No cyanosis or edema. Scattered ecchymoses. Neurologic: CN II-XII grossly intact. Sensation intact. Skin:     No rashes or jaundice. Psych:  Normal mood and affect. Discharge Info:   Current Discharge Medication List            Disposition: home  Home health, home PT  Activity: Activity as tolerated  Diet: DIET RENAL Regular; AHA-LOW-CHOL FAT    No orders of the defined types were placed in this encounter. Follow-up Information     None          Time spent in patient discharge planning and coordination 35 minutes.     Signed:  Joi Alcala MD

## 2018-09-10 NOTE — DISCHARGE INSTRUCTIONS
Incentive spirometry 10 times an hour while awake           Hyperkalemia: Care Instructions  Your Care Instructions    Hyperkalemia is too much potassium in the blood. Potassium helps keep the right mix of fluids in your body. It also helps your nerves and muscles work as they should. And it keeps your heartbeat in a normal rhythm. Some things can raise potassium levels. These include some health problems, medicines, and kidney problems. (Normally, your kidneys remove extra potassium.)  Too much potassium can cause nausea. It also can cause a heartbeat that isn't normal. But you may not have any symptoms. Too much potassium can be dangerous. That's why it's important to treat it. If you are taking any of the medicines that can raise your levels, your doctor will ask you to stop. You may get medicines to lower your levels. And you may have to limit or not eat foods that have a lot of potassium. Follow-up care is a key part of your treatment and safety. Be sure to make and go to all appointments, and call your doctor if you are having problems. It's also a good idea to know your test results and keep a list of the medicines you take. How can you care for yourself at home? · Take your medicines exactly as prescribed. Call your doctor if you think you are having a problem with your medicine. · Stop taking certain medicines if your doctor asks you to. They may be causing your high potassium levels. If you have concerns about stopping medicine, talk with your doctor. · If you have kidney, heart, or liver disease and have to limit fluids, talk with your doctor before you increase the amount of fluids you drink. If the doctor says it's okay, drink plenty of fluids. This means drinking enough so that your urine is light yellow or clear like water. · Avoid strenuous exercise until your doctor tells you it is okay. · Potassium is in many foods, including vegetables, fruits, and milk products.  Foods high in potassium include bananas, cantaloupe, broccoli, milk, potatoes, and tomatoes. · Low potassium foods include blueberries, raspberries, cucumber, white or brown rice, spaghetti, and macaroni. · Do not use a salt substitute without talking to your doctor first. Most of these are very high in potassium. · Be sure to tell your doctor about any prescription, over-the-counter, or herbal medicines you take. Some of these can raise potassium. When should you call for help? Call 911 anytime you think you may need emergency care. For example, call if:    · You passed out (lost consciousness).     · You have an unusual heartbeat. Your heart may beat fast or skip beats.    Call your doctor now or seek immediate medical care if:    · You have muscle aches.     · You feel very weak.    Watch closely for changes in your health, and be sure to contact your doctor if:    · You do not get better as expected. Where can you learn more? Go to http://mello-amber.info/. Enter E194 in the search box to learn more about \"Hyperkalemia: Care Instructions. \"  Current as of: May 12, 2017  Content Version: 11.7  © 6542-8993 [employer]. Care instructions adapted under license by [employer] (which disclaims liability or warranty for this information). If you have questions about a medical condition or this instruction, always ask your healthcare professional. [place] [address] any warranty or liability for your use of this information. DISCHARGE SUMMARY from Nurse    PATIENT INSTRUCTIONS:    After general anesthesia or intravenous sedation, for 24 hours or while taking prescription Narcotics:  · Limit your activities  · Do not drive and operate hazardous machinery  · Do not make important personal or business decisions  · Do  not drink alcoholic beverages  · If you have not urinated within 8 hours after discharge, please contact your surgeon on call.     Report the following to your surgeon:  · Excessive pain, swelling, redness or odor of or around the surgical area  · Temperature over 100.5  · Nausea and vomiting lasting longer than 4 hours or if unable to take medications  · Any signs of decreased circulation or nerve impairment to extremity: change in color, persistent  numbness, tingling, coldness or increase pain  · Any questions    What to do at Home:  *  Please give a list of your current medications to your Primary Care Provider. *  Please update this list whenever your medications are discontinued, doses are      changed, or new medications (including over-the-counter products) are added. *  Please carry medication information at all times in case of emergency situations. These are general instructions for a healthy lifestyle:    No smoking/ No tobacco products/ Avoid exposure to second hand smoke  Surgeon General's Warning:  Quitting smoking now greatly reduces serious risk to your health. Obesity, smoking, and sedentary lifestyle greatly increases your risk for illness    A healthy diet, regular physical exercise & weight monitoring are important for maintaining a healthy lifestyle    You may be retaining fluid if you have a history of heart failure or if you experience any of the following symptoms:  Weight gain of 3 pounds or more overnight or 5 pounds in a week, increased swelling in our hands or feet or shortness of breath while lying flat in bed. Please call your doctor as soon as you notice any of these symptoms; do not wait until your next office visit. Recognize signs and symptoms of STROKE:    F-face looks uneven    A-arms unable to move or move unevenly    S-speech slurred or non-existent    T-time-call 911 as soon as signs and symptoms begin-DO NOT go       Back to bed or wait to see if you get better-TIME IS BRAIN. Warning Signs of HEART ATTACK     Call 911 if you have these symptoms:   Chest discomfort.  Most heart attacks involve discomfort in the center of the chest that lasts more than a few minutes, or that goes away and comes back. It can feel like uncomfortable pressure, squeezing, fullness, or pain.  Discomfort in other areas of the upper body. Symptoms can include pain or discomfort in one or both arms, the back, neck, jaw, or stomach.  Shortness of breath with or without chest discomfort.  Other signs may include breaking out in a cold sweat, nausea, or lightheadedness. Don't wait more than five minutes to call 911 - MINUTES MATTER! Fast action can save your life. Calling 911 is almost always the fastest way to get lifesaving treatment. Emergency Medical Services staff can begin treatment when they arrive -- up to an hour sooner than if someone gets to the hospital by car. The discharge information has been reviewed with the patient. The patient verbalized understanding. Discharge medications reviewed with the patient and appropriate educational materials and side effects teaching were provided.   ___________________________________________________________________________________________________________________________________

## 2018-09-10 NOTE — CONSULTS
This note serves only as a link to an order for a consult.  Please see Dr Aston Farr consult of 9/7/18

## 2018-09-10 NOTE — DIALYSIS
TRANSFER OUT -DIALYSIS Hemodialysis treatment completed without complications. Patient alert and oriented and VS stable  /86  P 108   
 
 0.5 Kgs removed. Needles X2 removed from access and manual pressure held until hemostasis complete and pressure dressing applied. Patient to room 837 after dialysis.

## 2018-09-10 NOTE — PROGRESS NOTES
Problem: Falls - Risk of 
Goal: *Absence of Falls Document Calvin Beltran Fall Risk and appropriate interventions in the flowsheet. Outcome: Progressing Towards Goal 
Fall Risk Interventions: 
Mobility Interventions: Bed/chair exit alarm Medication Interventions: Bed/chair exit alarm Elimination Interventions: Bed/chair exit alarm History of Falls Interventions: Bed/chair exit alarm

## 2018-09-10 NOTE — PROGRESS NOTES
[facility] Subjective: ESRD  Pt seen on dialysis  No new complaints Review of Systems -  
General ROS: negative for - fever, chills Respiratory ROS: no SOB, cough, ARRIAGA Cardiovascular ROS: no CP, palpitations Gastrointestinal ROS: no N&V, abdominal pain, diarrhea Genito-Urinary ROS: no difficulty voiding, dysuria Neurological ROS: no seizures, focal weekness Objective: 
 
Vitals:  
 09/10/18 0928 09/10/18 0818 09/10/18 1233 09/10/18 8469 BP: 106/62 95/52 106/57 90/61 Pulse: 98 (!) 108 (!) 113 74 Resp:      
Temp:      
SpO2:      
Weight:      
Height:      
 
 
PE 
Gen: in no acute distress CV:reg rate Chest:clear Abd: soft Ext/Access: av fistula in rt upper arm ok Christian[facility] LAB Recent Labs  
   09/10/18 
 9483  09/09/18 
 0636  09/08/18 
 0650  09/07/18 
 1412 WBC  4.5  3.9*  5.1   --   
HGB  12.5  12.6  13.2   --   
HCT  37.9  38.8  39.0   --   
PLT  129*  133*  143*   --   
INR   --    --    --   1.3 Recent Labs  
   09/10/18 
 3354  09/09/18 
 0636  09/08/18 
 0650   09/07/18 
 1229 NA  139  141  140   --   128* K  4.5  4.2  4.9   < >  6.9*  
CL  99  100  99   --   88* CO2  23  25  26   --   17* GLU  93  118*  103*   --   115* BUN  28*  23  51*   --   112* CREA  9.74*  7.81*  11.00*   --   18.10* MG   --    --   2.0   --    --   
PHOS   --    --   6.1*   --    --   
CA  7.4*  7.0*  7.4*   --   7.8* ALB   --    --    --    --   4.1 TBILI   --    --    --    --   0.6 ALT   --    --    --    --   27 SGOT   --    --    --    --   14*  
 < > = values in this interval not displayed. Radiology A/P:  
Patient Active Problem List  
Diagnosis Code  Hypomagnesemia E83.42  
 Pleural effusion, left J90  
 Crohn's disease (HCC) K50.90  
 Intractable nausea and vomiting R11.2  Abdominal pain, chronic, right lower quadrant R10.31, G89.29  
 Leukocytosis D72.829  
 ESRD (end stage renal disease) on dialysis ([facility]) N18.6, Z99.2  Hypokalemia E87.6  Serum lipase elevation R74.8  History of ileostomy Z98.890  Peritonitis, dialysis-associated ([facility] [address] T85.71XA  Symptomatic cholelithiasis K80.20  
 NSTEMI (non-ST elevated myocardial infarction) (HCC) I21.4  Dyslipidemia E78.5  Gout M10.9  Chronic anemia D64.9  Atrial fibrillation, chronic (HCC) I48.2  Edema R60.9  Anemia D64.9  Chronic renal failure N18.9  Hypertension, benign I10  
 Aortic stenosis I35.0  Palpitations R00.2  Carotid stenosis without Infarct I65.29  
 CAD (coronary artery disease) I25.10  Hyperlipidemia E78.5  Paroxysmal tachycardia (HCC) I47.9  Paroxysmal atrial fibrillation (HCC) I48.0  Shortness of breath R06.02  
 Pulmonary edema J81.1  Pleural effusion, right J90  
 Hyperkalemia E87.5  Metabolic acidosis M58.6  ESRD (end stage renal disease) ([facility] [address] N18.6  Chest wall contusion, right, sequela S20.211S  
 Hemodialysis-associated hypotension I95.3 ESRD - on dialysis for clearance Anemia HTN Debillity Wilda Newby MD

## 2018-09-11 ENCOUNTER — PATIENT OUTREACH (OUTPATIENT)
Dept: CASE MANAGEMENT | Age: 83
End: 2018-09-11

## 2018-09-11 NOTE — PROGRESS NOTES
Care Coordinator called home # [phone] no answer, voicemail box full unable to leave voicemail message, Care Coordinator will make several more attempts to reach patient for Transitions of Care Outreach Call. This note will not be viewable in [address]

## 2018-09-11 NOTE — PROGRESS NOTES
Care Coordinator called home # [phone] no answer, voicemail box full unable to leave voicemail message. Care Coordinator will make final attempt to reach patient for Transitions of Care Outreach in 5 business days. This note will not be viewable in [address]

## 2018-09-13 LAB
ACID FAST STN SPEC: NEGATIVE
MYCOBACTERIUM SPEC QL CULT: NEGATIVE
SPECIMEN PREPARATION: NORMAL
SPECIMEN SOURCE: NORMAL

## 2018-09-20 ENCOUNTER — HOME HEALTH ADMISSION (OUTPATIENT)
Dept: HOME HEALTH SERVICES | Facility: HOME HEALTH | Age: 83
End: 2018-09-20

## 2018-09-22 ENCOUNTER — HOME CARE VISIT (OUTPATIENT)
Dept: SCHEDULING | Facility: HOME HEALTH | Age: 83
End: 2018-09-22

## 2018-09-24 ENCOUNTER — HOSPITAL ENCOUNTER (INPATIENT)
Age: 83
LOS: 5 days | Discharge: NURSING, REHAB OR LONG-TERM CARE | DRG: 480 | End: 2018-09-29
Attending: EMERGENCY MEDICINE | Admitting: INTERNAL MEDICINE
Payer: MEDICARE

## 2018-09-24 ENCOUNTER — APPOINTMENT (OUTPATIENT)
Dept: CT IMAGING | Age: 83
DRG: 480 | End: 2018-09-24
Attending: EMERGENCY MEDICINE
Payer: MEDICARE

## 2018-09-24 ENCOUNTER — APPOINTMENT (OUTPATIENT)
Dept: GENERAL RADIOLOGY | Age: 83
DRG: 480 | End: 2018-09-24
Attending: EMERGENCY MEDICINE
Payer: MEDICARE

## 2018-09-24 DIAGNOSIS — Z99.2 ESRD (END STAGE RENAL DISEASE) ON DIALYSIS (HCC): ICD-10-CM

## 2018-09-24 DIAGNOSIS — S72.142A FRACTURE, INTERTROCHANTERIC, LEFT FEMUR, CLOSED, INITIAL ENCOUNTER (HCC): Primary | ICD-10-CM

## 2018-09-24 DIAGNOSIS — N18.6 ESRD (END STAGE RENAL DISEASE) ON DIALYSIS (HCC): ICD-10-CM

## 2018-09-24 PROBLEM — S72.002A CLOSED FRACTURE OF LEFT HIP (HCC): Status: ACTIVE | Noted: 2018-09-24
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
## 2018-09-24 PROBLEM — S72.009A FRACTURE, HIP (HCC): Status: ACTIVE | Noted: 2018-09-24

## 2018-09-24 LAB
ABO + RH BLD: NORMAL
ALBUMIN SERPL-MCNC: 3.6 G/DL (ref 3.2–4.6)
ALBUMIN/GLOB SERPL: 0.8 {RATIO} (ref 1.2–3.5)
ALP SERPL-CCNC: 97 U/L (ref 50–136)
ALT SERPL-CCNC: 30 U/L (ref 12–65)
ANION GAP SERPL CALC-SCNC: 10 MMOL/L (ref 7–16)
ANTIGENS PRESENT BLD: NORMAL
ANTIGENS PRESENT RBC DONR: NORMAL
APTT PPP: 27.9 SEC (ref 23.2–35.3)
AST SERPL-CCNC: 50 U/L (ref 15–37)
ATRIAL RATE: 113 BPM
BACTERIA SPEC CULT: ABNORMAL
BASOPHILS # BLD: 0 K/UL (ref 0–0.2)
BASOPHILS NFR BLD: 0 % (ref 0–2)
BILIRUB SERPL-MCNC: 0.5 MG/DL (ref 0.2–1.1)
BLD PROD TYP BPU: NORMAL
BLOOD GROUP ANTIBODIES SERPL: NORMAL
BLOOD GROUP ANTIBODIES SERPL: NORMAL
BPU ID: NORMAL
BUN SERPL-MCNC: 19 MG/DL (ref 8–23)
CALCIUM SERPL-MCNC: 8.9 MG/DL (ref 8.3–10.4)
CALCULATED R AXIS, ECG10: 40 DEGREES
CALCULATED T AXIS, ECG11: -129 DEGREES
CHLORIDE SERPL-SCNC: 96 MMOL/L (ref 98–107)
CO2 SERPL-SCNC: 26 MMOL/L (ref 21–32)
CREAT SERPL-MCNC: 5.03 MG/DL (ref 0.6–1)
CROSSMATCH RESULT,%XM: NORMAL
DIAGNOSIS, 93000: NORMAL
DIFFERENTIAL METHOD BLD: ABNORMAL
EOSINOPHIL # BLD: 0 K/UL (ref 0–0.8)
EOSINOPHIL NFR BLD: 0 % (ref 0.5–7.8)
ERYTHROCYTE [DISTWIDTH] IN BLOOD BY AUTOMATED COUNT: 17.4 %
GLOBULIN SER CALC-MCNC: 4.8 G/DL (ref 2.3–3.5)
GLUCOSE SERPL-MCNC: 118 MG/DL (ref 65–100)
HCT VFR BLD AUTO: 37.3 % (ref 35.8–46.3)
HGB BLD-MCNC: 12.5 G/DL (ref 11.7–15.4)
IMM GRANULOCYTES # BLD: 0.1 K/UL (ref 0–0.5)
IMM GRANULOCYTES NFR BLD AUTO: 1 % (ref 0–5)
INR PPP: 1.1
LYMPHOCYTES # BLD: 1 K/UL (ref 0.5–4.6)
LYMPHOCYTES NFR BLD: 10 % (ref 13–44)
MCH RBC QN AUTO: 33.8 PG (ref 26.1–32.9)
MCHC RBC AUTO-ENTMCNC: 33.5 G/DL (ref 31.4–35)
MCV RBC AUTO: 100.8 FL (ref 79.6–97.8)
MONOCYTES # BLD: 0.5 K/UL (ref 0.1–1.3)
MONOCYTES NFR BLD: 5 % (ref 4–12)
NEUTS SEG # BLD: 8.7 K/UL (ref 1.7–8.2)
NEUTS SEG NFR BLD: 85 % (ref 43–78)
NRBC # BLD: 0 K/UL (ref 0–0.2)
PLATELET # BLD AUTO: 178 K/UL (ref 150–450)
PMV BLD AUTO: 9.8 FL (ref 9.4–12.3)
POTASSIUM SERPL-SCNC: 4.8 MMOL/L (ref 3.5–5.1)
POTASSIUM SERPL-SCNC: 7.2 MMOL/L (ref 3.5–5.1)
PROT SERPL-MCNC: 8.4 G/DL (ref 6.3–8.2)
PROTHROMBIN TIME: 13.9 SEC (ref 11.5–14.5)
Q-T INTERVAL, ECG07: 294 MS
QRS DURATION, ECG06: 70 MS
QTC CALCULATION (BEZET), ECG08: 410 MS
RBC # BLD AUTO: 3.7 M/UL (ref 4.05–5.2)
SERVICE CMNT-IMP: ABNORMAL
SODIUM SERPL-SCNC: 132 MMOL/L (ref 136–145)
SPECIMEN EXP DATE BLD: NORMAL
STATUS OF UNIT,%ST: NORMAL
TROPONIN I SERPL-MCNC: 0.08 NG/ML (ref 0.02–0.05)
UNIT DIVISION, %UDIV: 0
VENTRICULAR RATE, ECG03: 117 BPM
WBC # BLD AUTO: 10.3 K/UL (ref 4.3–11.1)

## 2018-09-24 PROCEDURE — 77030036696 HC BOOT TRACT BUCKS S2SG -A

## 2018-09-24 PROCEDURE — 96374 THER/PROPH/DIAG INJ IV PUSH: CPT | Performed by: EMERGENCY MEDICINE

## 2018-09-24 PROCEDURE — 85730 THROMBOPLASTIN TIME PARTIAL: CPT

## 2018-09-24 PROCEDURE — 84134 ASSAY OF PREALBUMIN: CPT

## 2018-09-24 PROCEDURE — 99285 EMERGENCY DEPT VISIT HI MDM: CPT | Performed by: EMERGENCY MEDICINE

## 2018-09-24 PROCEDURE — 74011250636 HC RX REV CODE- 250/636: Performed by: EMERGENCY MEDICINE

## 2018-09-24 PROCEDURE — 86901 BLOOD TYPING SEROLOGIC RH(D): CPT

## 2018-09-24 PROCEDURE — 73590 X-RAY EXAM OF LOWER LEG: CPT

## 2018-09-24 PROCEDURE — 65270000029 HC RM PRIVATE

## 2018-09-24 PROCEDURE — 73502 X-RAY EXAM HIP UNI 2-3 VIEWS: CPT

## 2018-09-24 PROCEDURE — 82306 VITAMIN D 25 HYDROXY: CPT

## 2018-09-24 PROCEDURE — 74011250637 HC RX REV CODE- 250/637: Performed by: NURSE PRACTITIONER

## 2018-09-24 PROCEDURE — 86905 BLOOD TYPING RBC ANTIGENS: CPT

## 2018-09-24 PROCEDURE — 36415 COLL VENOUS BLD VENIPUNCTURE: CPT

## 2018-09-24 PROCEDURE — 84484 ASSAY OF TROPONIN QUANT: CPT

## 2018-09-24 PROCEDURE — 77030032490 HC SLV COMPR SCD KNE COVD -B

## 2018-09-24 PROCEDURE — 93005 ELECTROCARDIOGRAM TRACING: CPT | Performed by: EMERGENCY MEDICINE

## 2018-09-24 PROCEDURE — 70450 CT HEAD/BRAIN W/O DYE: CPT

## 2018-09-24 PROCEDURE — 86870 RBC ANTIBODY IDENTIFICATION: CPT

## 2018-09-24 PROCEDURE — 74011000302 HC RX REV CODE- 302: Performed by: NURSE PRACTITIONER

## 2018-09-24 PROCEDURE — 85610 PROTHROMBIN TIME: CPT

## 2018-09-24 PROCEDURE — 74011250637 HC RX REV CODE- 250/637: Performed by: INTERNAL MEDICINE

## 2018-09-24 PROCEDURE — 96375 TX/PRO/DX INJ NEW DRUG ADDON: CPT | Performed by: EMERGENCY MEDICINE

## 2018-09-24 PROCEDURE — 77030005518 HC CATH URETH FOL 2W BARD -B

## 2018-09-24 PROCEDURE — 77030021907 HC KT URIN FOL O&M -A

## 2018-09-24 PROCEDURE — 51702 INSERT TEMP BLADDER CATH: CPT | Performed by: EMERGENCY MEDICINE

## 2018-09-24 PROCEDURE — 80053 COMPREHEN METABOLIC PANEL: CPT

## 2018-09-24 PROCEDURE — 77030027138 HC INCENT SPIROMETER -A

## 2018-09-24 PROCEDURE — 73552 X-RAY EXAM OF FEMUR 2/>: CPT

## 2018-09-24 PROCEDURE — 86580 TB INTRADERMAL TEST: CPT | Performed by: NURSE PRACTITIONER

## 2018-09-24 PROCEDURE — 71045 X-RAY EXAM CHEST 1 VIEW: CPT

## 2018-09-24 PROCEDURE — 87641 MR-STAPH DNA AMP PROBE: CPT

## 2018-09-24 PROCEDURE — 86902 BLOOD TYPE ANTIGEN DONOR EA: CPT

## 2018-09-24 PROCEDURE — 85025 COMPLETE CBC W/AUTO DIFF WBC: CPT

## 2018-09-24 PROCEDURE — 83970 ASSAY OF PARATHORMONE: CPT

## 2018-09-24 PROCEDURE — 81003 URINALYSIS AUTO W/O SCOPE: CPT | Performed by: EMERGENCY MEDICINE

## 2018-09-24 PROCEDURE — 84132 ASSAY OF SERUM POTASSIUM: CPT

## 2018-09-24 RX ORDER — OXYCODONE HYDROCHLORIDE 5 MG/1
5 TABLET ORAL
Status: DISCONTINUED | OUTPATIENT
Start: 2018-09-24 | End: 2018-09-27 | Stop reason: SDUPTHER

## 2018-09-24 RX ORDER — MORPHINE SULFATE 2 MG/ML
4 INJECTION, SOLUTION INTRAMUSCULAR; INTRAVENOUS
Status: COMPLETED | OUTPATIENT
Start: 2018-09-24 | End: 2018-09-24

## 2018-09-24 RX ORDER — SEVELAMER CARBONATE 800 MG/1
1600 TABLET, FILM COATED ORAL
Status: DISCONTINUED | OUTPATIENT
Start: 2018-09-25 | End: 2018-09-29 | Stop reason: HOSPADM

## 2018-09-24 RX ORDER — SODIUM CHLORIDE, SODIUM LACTATE, POTASSIUM CHLORIDE, CALCIUM CHLORIDE 600; 310; 30; 20 MG/100ML; MG/100ML; MG/100ML; MG/100ML
75 INJECTION, SOLUTION INTRAVENOUS
Status: DISPENSED | OUTPATIENT
Start: 2018-09-25 | End: 2018-09-26

## 2018-09-24 RX ORDER — ZOLPIDEM TARTRATE 5 MG/1
5 TABLET ORAL
Status: DISCONTINUED | OUTPATIENT
Start: 2018-09-24 | End: 2018-09-24

## 2018-09-24 RX ORDER — SODIUM CHLORIDE 0.9 % (FLUSH) 0.9 %
5-10 SYRINGE (ML) INJECTION AS NEEDED
Status: DISCONTINUED | OUTPATIENT
Start: 2018-09-24 | End: 2018-09-29 | Stop reason: HOSPADM

## 2018-09-24 RX ORDER — HYDROMORPHONE HYDROCHLORIDE 1 MG/ML
0.5 INJECTION, SOLUTION INTRAMUSCULAR; INTRAVENOUS; SUBCUTANEOUS ONCE
Status: COMPLETED | OUTPATIENT
Start: 2018-09-24 | End: 2018-09-24

## 2018-09-24 RX ORDER — ONDANSETRON 2 MG/ML
4 INJECTION INTRAMUSCULAR; INTRAVENOUS
Status: COMPLETED | OUTPATIENT
Start: 2018-09-24 | End: 2018-09-24

## 2018-09-24 RX ORDER — CEFAZOLIN SODIUM/WATER 2 G/20 ML
2 SYRINGE (ML) INTRAVENOUS
Status: DISPENSED | OUTPATIENT
Start: 2018-09-25 | End: 2018-09-26

## 2018-09-24 RX ORDER — DIPHENHYDRAMINE HYDROCHLORIDE 50 MG/ML
12.5 INJECTION, SOLUTION INTRAMUSCULAR; INTRAVENOUS
Status: DISCONTINUED | OUTPATIENT
Start: 2018-09-24 | End: 2018-09-24

## 2018-09-24 RX ORDER — HYDROMORPHONE HYDROCHLORIDE 1 MG/ML
0.5 INJECTION, SOLUTION INTRAMUSCULAR; INTRAVENOUS; SUBCUTANEOUS
Status: DISCONTINUED | OUTPATIENT
Start: 2018-09-24 | End: 2018-09-26

## 2018-09-24 RX ORDER — ACETAMINOPHEN 325 MG/1
650 TABLET ORAL
Status: DISCONTINUED | OUTPATIENT
Start: 2018-09-24 | End: 2018-09-24

## 2018-09-24 RX ORDER — SODIUM CHLORIDE 0.9 % (FLUSH) 0.9 %
5-10 SYRINGE (ML) INJECTION EVERY 8 HOURS
Status: DISCONTINUED | OUTPATIENT
Start: 2018-09-24 | End: 2018-09-29 | Stop reason: HOSPADM

## 2018-09-24 RX ORDER — ACETAMINOPHEN 325 MG/1
650 TABLET ORAL EVERY 8 HOURS
Status: DISCONTINUED | OUTPATIENT
Start: 2018-09-24 | End: 2018-09-27

## 2018-09-24 RX ORDER — AMOXICILLIN 250 MG
1 CAPSULE ORAL DAILY
Status: DISCONTINUED | OUTPATIENT
Start: 2018-09-25 | End: 2018-09-29 | Stop reason: HOSPADM

## 2018-09-24 RX ORDER — METOPROLOL TARTRATE 25 MG/1
25 TABLET, FILM COATED ORAL 2 TIMES DAILY
Status: DISCONTINUED | OUTPATIENT
Start: 2018-09-24 | End: 2018-09-29 | Stop reason: HOSPADM

## 2018-09-24 RX ORDER — DILTIAZEM HYDROCHLORIDE 60 MG/1
60 TABLET, FILM COATED ORAL
Status: DISCONTINUED | OUTPATIENT
Start: 2018-09-25 | End: 2018-09-29 | Stop reason: HOSPADM

## 2018-09-24 RX ORDER — NALOXONE HYDROCHLORIDE 0.4 MG/ML
0.4 INJECTION, SOLUTION INTRAMUSCULAR; INTRAVENOUS; SUBCUTANEOUS AS NEEDED
Status: DISCONTINUED | OUTPATIENT
Start: 2018-09-24 | End: 2018-09-29 | Stop reason: HOSPADM

## 2018-09-24 RX ORDER — SODIUM CHLORIDE 9 MG/ML
2000 INJECTION, SOLUTION INTRAVENOUS CONTINUOUS
Status: DISCONTINUED | OUTPATIENT
Start: 2018-09-24 | End: 2018-09-24

## 2018-09-24 RX ORDER — PANTOPRAZOLE SODIUM 40 MG/1
40 TABLET, DELAYED RELEASE ORAL
Status: DISCONTINUED | OUTPATIENT
Start: 2018-09-25 | End: 2018-09-28

## 2018-09-24 RX ORDER — ONDANSETRON 2 MG/ML
4 INJECTION INTRAMUSCULAR; INTRAVENOUS
Status: DISCONTINUED | OUTPATIENT
Start: 2018-09-24 | End: 2018-09-29 | Stop reason: HOSPADM

## 2018-09-24 RX ORDER — TRAMADOL HYDROCHLORIDE 50 MG/1
50 TABLET ORAL
Status: DISCONTINUED | OUTPATIENT
Start: 2018-09-24 | End: 2018-09-29 | Stop reason: HOSPADM

## 2018-09-24 RX ORDER — HYDROCODONE BITARTRATE AND ACETAMINOPHEN 5; 325 MG/1; MG/1
1 TABLET ORAL
Status: DISCONTINUED | OUTPATIENT
Start: 2018-09-24 | End: 2018-09-24

## 2018-09-24 RX ADMIN — HYDROMORPHONE HYDROCHLORIDE 0.5 MG: 1 INJECTION, SOLUTION INTRAMUSCULAR; INTRAVENOUS; SUBCUTANEOUS at 19:06

## 2018-09-24 RX ADMIN — Medication 5 ML: at 21:18

## 2018-09-24 RX ADMIN — TUBERCULIN PURIFIED PROTEIN DERIVATIVE 5 UNITS: 5 INJECTION, SOLUTION INTRADERMAL at 20:58

## 2018-09-24 RX ADMIN — METOPROLOL TARTRATE 25 MG: 25 TABLET, FILM COATED ORAL at 21:17

## 2018-09-24 RX ADMIN — MORPHINE SULFATE 4 MG: 2 INJECTION, SOLUTION INTRAMUSCULAR; INTRAVENOUS at 18:01

## 2018-09-24 RX ADMIN — ONDANSETRON 4 MG: 2 INJECTION INTRAMUSCULAR; INTRAVENOUS at 19:06

## 2018-09-24 RX ADMIN — ACETAMINOPHEN 650 MG: 325 TABLET ORAL at 21:17

## 2018-09-24 NOTE — ED TRIAGE NOTES
Per EMS, pt was sitting at table and fell out of her chair. Pt doesn't remember falling and doesn't remember how she fell. Lac left superior temporal area and left leg pain. Pt daughter states she things pt was sitting too close to the edge. Moderate A-fib RVR in route with highest around 130bpm. All other VSS in route. No INT. Pt has some nausea and dizziness at this time. Pt has shunt in RAC for dialysis. Pt to triage via w/c. Pt has passive ROM to left leg, but states that she can't independently move her left leg.

## 2018-09-24 NOTE — ED NOTES
Pt back from ct. Tachycardic. Still in pain. More pain meds given as per doctors order. Family at bedside. No signs of distress

## 2018-09-24 NOTE — ED PROVIDER NOTES
HPI Comments: Patient presents to the ER to have a fall. Patient was sitting and fell trying to get out of her chair. Patient does not remember how she fell. Reports she did hit the left side of her head as well as her left hip. Patient has been unable to ambulate since the fall. She is unsure of any syncope or loss of consciousness. EMS reports patient was in A. Fib and RVR, has a history of atrial fibrillation. Patient also has a history of end-stage renal disease, did have a full dialysis run today. Patient is a 80 y.o. female presenting with fall. The history is provided by the patient. Fall The accident occurred 1 to 2 hours ago. The fall occurred from a stool. She fell from a height of ground level. She landed on hard floor. The pain is present in the left hip. The pain is at a severity of 4/10. She was not ambulatory at the scene. Pertinent negatives include no numbness, no abdominal pain, no loss of consciousness, no tingling and no laceration. Past Medical History:  
Diagnosis Date  Abdominal pain, chronic, right lower quadrant 5/16/2013  Anticoagulated on Coumadin   
 was told to hold for 5 days- held as 10/15/16  Aortic stenosis 06/26/2015  
 mild to moderate per echo  Arthritis   
 minimal  
 Atrial fibrillation, chronic (HCC) 1/14/2016  CAD (coronary artery disease) 06/2013  
 mild MI   
 Carotid stenosis without Infarct 1/19/2016  Chronic anemia 6/26/2013  Chronic kidney disease ESRD stage 4, M, W, F dialysis. Kristina Saenz in  [facility]  Chronic renal failure 1/19/2016  Crohn's disease ([facility]   
 colectomy  Dyslipidemia 6/26/2013  Edema 1/19/2016  ESRD (end stage renal disease) on dialysis ([facility] 05/16/2013  
 right arm functioning- fistula----M-W-F AT [address]  
 Former smoker  GERD (gastroesophageal reflux disease)   
 controlled on meds takes prilosec  Gout 6/26/2013  History of ileostomy 5/16/2013  History of peritonitis 2013  
 dialysis associated  History of sepsis 2009  Hypercholesteremia  Hyperlipidemia 1/19/2016  Hypertension   
  well controlled by medication  Hypokalemia 5/16/2013  Hypomagnesemia 6/23/2009  Ileostomy in place [facility])  Intractable nausea and vomiting 5/16/2013  Leukocytosis 5/16/2013  
 NSTEMI (non-ST elevated myocardial infarction) ([facility] 6/26/2013  Palpitations 1/19/2016  Paroxysmal atrial fibrillation ([facility] 1/19/2016  Paroxysmal tachycardia ([facility] 1/19/2016  S/P AAA repair \" aneurysm in my stomach repaired\"  Serum lipase elevation 5/16/2013  Tricuspid regurgitation EF 55-60%  
 moderate to severe per echo 6/26/15 Past Surgical History:  
Procedure Laterality Date  ABDOMEN SURGERY PROC UNLISTED    
 insertion and removal of infected PD cath  COLONOSCOPY N/A 10/20/2016 ILEOSCOPY THROUGH OSTOMY/ 25 performed by Deborah Mccauley MD at [facility] HX AAA REPAIR  2013  HX BREAST BIOPSY Bilateral    
  x3 on left breast  
 HX CATARACT REMOVAL Bilateral 2005  
 wit IOL  HX COLECTOMY  1983  
 colon removed / ileostomy  HX LAP CHOLECYSTECTOMY  2013  VASCULAR SURGERY PROCEDURE UNLIST Left 2009  
 fistulagram 8/09, LUE  VASCULAR SURGERY PROCEDURE UNLIST    
 7/09 L subclavian hemodialysis cath  VASCULAR SURGERY PROCEDURE UNLIST Right 2014  
 fistula Family History:  
Problem Relation Age of Onset  Cancer Mother  Stroke Father  Hypertension Father  Heart Disease Brother  Heart Disease Brother  Stroke Sister  Hypertension Sister Social History Social History  Marital status:  Spouse name: N/A  
 Number of children: N/A  
 Years of education: N/A Occupational History  Not on file. Social History Main Topics  Smoking status: Former Smoker Packs/day: 0.25 Years: 5.00 Quit date: 8/22/1971  Smokeless tobacco: Never Used Comment: quit years ago  Alcohol use No  
 Drug use: No  
 Sexual activity: Not on file Other Topics Concern  Not on file Social History Narrative ALLERGIES: Adhesive Review of Systems Constitutional: Negative for diaphoresis and fatigue. HENT: Negative for congestion and dental problem. Eyes: Negative for pain, redness and visual disturbance. Respiratory: Negative for cough, chest tightness and shortness of breath. Cardiovascular: Negative for palpitations and leg swelling. Gastrointestinal: Negative for abdominal pain. Endocrine: Negative for polyphagia and polyuria. Genitourinary: Negative for dyspareunia, dysuria and urgency. Musculoskeletal: Negative for back pain and gait problem. Allergic/Immunologic: Negative for food allergies and immunocompromised state. Neurological: Negative for tingling, loss of consciousness, light-headedness and numbness. Hematological: Negative for adenopathy. Does not bruise/bleed easily. Psychiatric/Behavioral: Negative for behavioral problems and confusion. All other systems reviewed and are negative. Vitals:  
 09/24/18 1629 BP: 105/62 Pulse: (!) 121 Resp: 16 Temp: 97.5 °F (36.4 °C) SpO2: 98% Weight: 64.9 kg (143 lb) Height: 5' 3\" (1.6 m) Physical Exam  
Constitutional: She is oriented to person, place, and time. She appears well-developed and well-nourished. HENT:  
Head: Normocephalic. Cardiovascular: Intact distal pulses. An irregularly irregular rhythm present. Tachycardia present. Pulmonary/Chest: Effort normal and breath sounds normal. She has no wheezes. Abdominal: Soft. Bowel sounds are normal. She exhibits no distension. There is no tenderness. Musculoskeletal:  
     Left hip: She exhibits decreased range of motion. Neurological: She is alert and oriented to person, place, and time. She has normal reflexes. Skin: No laceration noted. Nursing note and vitals reviewed. MDM Number of Diagnoses or Management Options ESRD (end stage renal disease) on dialysis [facility]): Fracture, intertrochanteric, left femur, closed, initial encounter [facility]):  
Diagnosis management comments: Concern for possible hip fracture. We'll obtain basic labs, EKG, CT scan of her head as well 
 
7:24 PM 
 
 
  
Amount and/or Complexity of Data Reviewed Clinical lab tests: ordered and reviewed Tests in the radiology section of CPT®: ordered and reviewed Risk of Complications, Morbidity, and/or Mortality Presenting problems: moderate Diagnostic procedures: moderate Management options: moderate Patient Progress Patient progress: stable ED Course Procedures

## 2018-09-24 NOTE — H&P
History and Physical 
 
Patient: Noah Bailey MRN: [id number]  SSN: [id number] YOB: 1933  Age: 80 y.o. Sex: female Subjective: Noah Bailey is a 80 y.o. female who came to ER due to s/p fall and could not get up. Patient is an ESRD patient on HD for the past 8 years. She had HD today without problems. Today she was having lunch and then slid down from her chair and landed on the floor. It is unclear if she hit her head or lost her consciousness for a short time or not. She landed on her left hip and left side of body. She could not get up. No shortness of breath. No dizziness. No chest pain. No fever. No shaking. No chills. In ER she is found to have fracture of left hip and is being admitted for further management. She has history of chronic AF and used to be on Warfarin. She did not want to be on it and refused that. Her cardiologist is aware of this as per her daughter who is the informant at the bedside. Past Medical History:  
Diagnosis Date  Abdominal pain, chronic, right lower quadrant 5/16/2013  Anticoagulated on Coumadin   
 was told to hold for 5 days- held as 10/15/16  Aortic stenosis 06/26/2015  
 mild to moderate per echo  Arthritis   
 minimal  
 Atrial fibrillation, chronic (HCC) 1/14/2016  CAD (coronary artery disease) 06/2013  
 mild MI   
 Carotid stenosis without Infarct 1/19/2016  Chronic anemia 6/26/2013  Chronic kidney disease ESRD stage 4, M, W, F dialysis. [facility] in  [facility]  Chronic renal failure 1/19/2016  Crohn's disease ([facility] [address]   
 colectomy  Dyslipidemia 6/26/2013  Edema 1/19/2016  ESRD (end stage renal disease) on dialysis ([facility] [address] [address] 05/16/2013  
 right arm functioning- fistula----M-W-F AT [facility]  
 Former smoker  GERD (gastroesophageal reflux disease)   
 controlled on meds takes prilosec  Gout 6/26/2013  History of ileostomy 5/16/2013  History of peritonitis 2013 dialysis associated  History of sepsis 2009  Hypercholesteremia  Hyperlipidemia 1/19/2016  Hypertension   
  well controlled by medication  Hypokalemia 5/16/2013  Hypomagnesemia 6/23/2009  Ileostomy in place [facility])  Intractable nausea and vomiting 5/16/2013  Leukocytosis 5/16/2013  
 NSTEMI (non-ST elevated myocardial infarction) ([address] 6/26/2013  Palpitations 1/19/2016  Paroxysmal atrial fibrillation ([address] 1/19/2016  Paroxysmal tachycardia ([address] 1/19/2016  S/P AAA repair \" aneurysm in my stomach repaired\"  Serum lipase elevation 5/16/2013  Tricuspid regurgitation EF 55-60%  
 moderate to severe per echo 6/26/15 Past Surgical History:  
Procedure Laterality Date  ABDOMEN SURGERY PROC UNLISTED    
 insertion and removal of infected PD cath  COLONOSCOPY N/A 10/20/2016 ILEOSCOPY THROUGH OSTOMY/ 25 performed by Anabell Fuchs MD at [facility] HX AAA REPAIR  2013  HX BREAST BIOPSY Bilateral    
  x3 on left breast  
 HX CATARACT REMOVAL Bilateral 2005  
 wit IOL  HX COLECTOMY  1983  
 colon removed / ileostomy  HX LAP CHOLECYSTECTOMY  2013  VASCULAR SURGERY PROCEDURE UNLIST Left 2009  
 fistulagram 8/09, LUE  VASCULAR SURGERY PROCEDURE UNLIST    
 7/09 L subclavian hemodialysis cath  VASCULAR SURGERY PROCEDURE UNLIST Right 2014  
 fistula Family History Problem Relation Age of Onset  Cancer Mother  Stroke Father  Hypertension Father  Heart Disease Brother  Heart Disease Brother  Stroke Sister  Hypertension Sister Social History Substance Use Topics  Smoking status: Former Smoker Packs/day: 0.25 Years: 5.00 Quit date: 8/22/1971  Smokeless tobacco: Never Used Comment: quit years ago  Alcohol use No  
  
Prior to Admission medications Medication Sig Start Date End Date Taking? Authorizing Provider acetaminophen (TYLENOL) 325 mg tablet Take 1 Tab by mouth every six (6) hours as needed. 9/10/18   Cari Benoit MD  
metoprolol tartrate (LOPRESSOR) 50 mg tablet Take 0.5 Tabs by mouth two (2) times a day. 9/10/18   Cari Benoit MD  
dilTIAZem (CARDIZEM) 60 mg tablet Take 1 Tab by mouth Before breakfast, lunch, and dinner. 9/10/18   Cari Benoit MD  
omeprazole (PRILOSEC) 20 mg capsule Take 20 mg by mouth two (2) times a day. Historical Provider  
esomeprazole (NEXIUM) 20 mg capsule Take  by mouth daily. Historical Provider  
aspirin delayed-release 81 mg tablet Take  by mouth daily. Historical Provider  
sevelamer carbonate (RENVELA) 800 mg tab tab Take 1,600 mg by mouth three (3) times daily (with meals). Indications: Renal Osteodystrophy with Hyperphosphatemia    Historical Provider  
zolpidem (AMBIEN) 10 mg tablet Take 5 mg by mouth nightly. Indications: SLEEP-ONSET INSOMNIA    Historical Provider Allergies Allergen Reactions  Adhesive Other (comments) Skin tears Review of Systems: 
 
Constitutional: Negative for chills and fever. HENT: Negative for rhinorrhea and sore throat. Eyes: Negative for pain, redness and visual disturbance. Respiratory: Negative for chest tightness, shortness of breath and wheezing. Cardiovascular: Negative for chest pain and leg swelling. Gastrointestinal: Negative for abdominal pain, bowel incontinence, diarrhea, nausea and vomiting. Genitourinary: Negative for bladder incontinence, dysuria and hematuria. Musculoskeletal: Negative for back pain, gait problem, neck pain and neck stiffness. Skin: Negative for color change and rash. Neurological: negative for speech difficulty. Negative for focal weakness, weakness, numbness, headaches and loss of balance. Psychiatric/Behavioral: Negative for agitation, confusion and memory loss. Objective:  
 
Vitals:  
 09/24/18 1732 09/24/18 1738 09/24/18 1819 09/24/18 1910 BP: 133/62  122/61 122/57 Pulse:  (!) 110 (!) 118 (!) 109 Resp:      
Temp:      
SpO2:  97% 97% 96% Weight:      
Height:      
  
 
Physical Exam: 
 
General:                    The patient is an elderly female in no acute distress. Lying flat in bed. Head:                                   Normocephalic/atraumatic. Eyes:                                   palpebral pallor, no scleral icterus. ENT:                                    External auricular and nasal exam within normal limits. Mucous membranes are moist. 
Neck:                                   Supple, non-tender, no JVD. Lungs:                       Clear to auscultation bilaterally without wheezes or crackles. No respiratory distress or accessory muscle use. Heart:                                  irregular rate and rhythm, without murmurs, rubs, or gallops. Abdomen:                  Soft, non-tender, non-distended with normoactive bowel sounds. Genitourinary:           No tenderness over the bladder or bilateral CVAs. Extremities:               Without clubbing, cyanosis, or edema. Right arm with AV fistula with good bruit and thrill. No evidence of active infection. No bleeding. Skin:                                    Normal color, texture, and turgor. No rashes, lesions, or jaundice. Pulses:                      Radial and dorsalis pedis pulses present 2+ bilaterally. Capillary refill <2s. Neurologic:                CN II-XII grossly intact and symmetrical.  
                                            Moving all four extremities well with no focal deficits. Psychiatric:                Pleasant demeanor, appropriate affect. Alert and oriented x 3 Lab and data Recent Results (from the past 24 hour(s)) TROPONIN I Collection Time: 09/24/18  4:37 PM  
Result Value Ref Range Troponin-I, Qt. 0.08 (H) 0.02 - 0.05 NG/ML  
CBC WITH AUTOMATED DIFF Collection Time: 09/24/18  4:37 PM  
Result Value Ref Range WBC 10.3 4.3 - 11.1 K/uL  
 RBC 3.70 (L) 4.05 - 5.2 M/uL  
 HGB 12.5 11.7 - 15.4 g/dL HCT 37.3 35.8 - 46.3 % .8 (H) 79.6 - 97.8 FL  
 MCH 33.8 (H) 26.1 - 32.9 PG  
 MCHC 33.5 31.4 - 35.0 g/dL  
 RDW 17.4 % PLATELET 619 427 - 882 K/uL MPV 9.8 9.4 - 12.3 FL ABSOLUTE NRBC 0.00 0.0 - 0.2 K/uL  
 DF AUTOMATED NEUTROPHILS 85 (H) 43 - 78 % LYMPHOCYTES 10 (L) 13 - 44 % MONOCYTES 5 4.0 - 12.0 % EOSINOPHILS 0 (L) 0.5 - 7.8 % BASOPHILS 0 0.0 - 2.0 % IMMATURE GRANULOCYTES 1 0.0 - 5.0 %  
 ABS. NEUTROPHILS 8.7 (H) 1.7 - 8.2 K/UL  
 ABS. LYMPHOCYTES 1.0 0.5 - 4.6 K/UL  
 ABS. MONOCYTES 0.5 0.1 - 1.3 K/UL  
 ABS. EOSINOPHILS 0.0 0.0 - 0.8 K/UL  
 ABS. BASOPHILS 0.0 0.0 - 0.2 K/UL  
 ABS. IMM. GRANS. 0.1 0.0 - 0.5 K/UL METABOLIC PANEL, COMPREHENSIVE Collection Time: 09/24/18  4:37 PM  
Result Value Ref Range Sodium 132 (L) 136 - 145 mmol/L Potassium 7.2 (HH) 3.5 - 5.1 mmol/L Chloride 96 (L) 98 - 107 mmol/L  
 CO2 26 21 - 32 mmol/L Anion gap 10 7 - 16 mmol/L Glucose 118 (H) 65 - 100 mg/dL BUN 19 8 - 23 MG/DL Creatinine 5.03 (H) 0.6 - 1.0 MG/DL  
 GFR est AA 11 (L) >60 ml/min/1.73m2 GFR est non-AA 9 (L) >60 ml/min/1.73m2 Calcium 8.9 8.3 - 10.4 MG/DL Bilirubin, total 0.5 0.2 - 1.1 MG/DL  
 ALT (SGPT) 30 12 - 65 U/L  
 AST (SGOT) 50 (H) 15 - 37 U/L Alk. phosphatase 97 50 - 136 U/L Protein, total 8.4 (H) 6.3 - 8.2 g/dL Albumin 3.6 3.2 - 4.6 g/dL Globulin 4.8 (H) 2.3 - 3.5 g/dL A-G Ratio 0.8 (L) 1.2 - 3.5 EKG, 12 LEAD, INITIAL Collection Time: 09/24/18  4:49 PM  
Result Value Ref Range Ventricular Rate 117 BPM  
 Atrial Rate 113 BPM  
 QRS Duration 70 ms Q-T Interval 294 ms QTC Calculation (Bezet) 410 ms Calculated R Axis 40 degrees Calculated T Axis -129 degrees Diagnosis Atrial fibrillation with rapid ventricular response ST & T wave abnormality, consider inferior ischemia ST & T wave abnormality, consider anterolateral ischemia Abnormal ECG When compared with ECG of 07-SEP-2018 12:20, Left bundle branch block is no longer Present POTASSIUM Collection Time: 09/24/18  7:07 PM  
Result Value Ref Range Potassium 4.8 3.5 - 5.1 mmol/L  
 
XR femur Impression: Acute mildly displaced left proximal femoral intertrochanteric Fracture. CXR IMPRESSION: No acute abnormality CT head IMPRESSION:   No acute intracranial abnormality. Echo 
7/31/2018 SUMMARY: 
 
-  Left ventricle: Systolic function was normal. Ejection fraction was 
estimated in the range of 55 % to 60 %. There were no regional wall motion 
abnormalities. There was moderate concentric hypertrophy. -  Left atrium: The atrium was moderately dilated. -  Aortic valve: There was mild stenosis. The aortic valve area by the 
continuity equation was 1.3 cm2. 
 
-  Mitral valve: There was mild regurgitation. -  Tricuspid valve: There was moderate to severe regurgitation. I have reviewed chest x-ray and ECG myself. Assessment:  
 
Hospital Problems  Date Reviewed: 9/8/2018 Codes Class Noted POA Fall ICD-10-CM: W19. Joe aDily ICD-9-CM: K267.4  9/24/2018 Unknown * (Principal)Closed fracture of left hip ([facility] [address] ICD-10-CM: H57.542C ICD-9-CM: 820.8  9/24/2018 Unknown Fracture, hip ([facility] [address] ICD-10-CM: Q97.000W ICD-9-CM: 820.8  9/24/2018 Unknown Hypertension, benign ICD-10-CM: I10 
ICD-9-CM: 401.1  1/19/2016 Yes CAD (coronary artery disease) ICD-10-CM: I25.10 ICD-9-CM: 414.00  1/19/2016 Yes Overview Addendum 1/19/2016  1:33 PM by Mary TERAN - 50% LAD and RCA by cath 2 years ago Atrial fibrillation, chronic (HCC) ICD-10-CM: I48.2 ICD-9-CM: 427.31  1/14/2016 Yes Dyslipidemia (Chronic) ICD-10-CM: R16.4 ICD-9-CM: 272.4  6/26/2013 Yes Chronic anemia (Chronic) ICD-10-CM: D64.9 ICD-9-CM: 285.9  6/26/2013 Yes ESRD (end stage renal disease) on dialysis [facility] ICD-10-CM: N18.6, Z99.2 ICD-9-CM: 585.6, V45.11  5/16/2013 Yes AS Plan: S/P fall Left hip fracture, intertrochanteric femur fracture. Admit to remote telemetry floor. Consult orthopedics. Pain control for now. AF Ventricular rate is acceptable. Resume home medications. Monitor. History of CAD and AS on echo. Will consult cardiology to help with pre-operative evaluation. ESRD Patient's usual hemodialysis schedule is Monday, Wednesday and Friday. Consult nephrology. Hypertension Monitor blood pressure and manage accordingly. Continue home medications. Patient requires hospital stay as an in-patient and anticipated stay is more than 2 midnights due to the serious nature of the illness. I have discussed with patient regarding advance directive. Patient would like to have a full-code status. I have discussed the plan of care with patient and family members at bedside. DVT prophylaxis : SCD Risk of deterioration is high. Signed By: Mack Eisenberg MD   
 September 24, 2018

## 2018-09-25 ENCOUNTER — ANESTHESIA EVENT (OUTPATIENT)
Dept: SURGERY | Age: 83
DRG: 480 | End: 2018-09-25
Payer: MEDICARE

## 2018-09-25 LAB
ANION GAP SERPL CALC-SCNC: 11 MMOL/L (ref 7–16)
APPEARANCE UR: ABNORMAL
ATRIAL RATE: 105 BPM
BACTERIA URNS QL MICRO: ABNORMAL /HPF
BILIRUB UR QL: NEGATIVE
BUN SERPL-MCNC: 30 MG/DL (ref 8–23)
CALCIUM SERPL-MCNC: 8.4 MG/DL (ref 8.3–10.4)
CALCIUM SERPL-MCNC: 8.7 MG/DL (ref 8.3–10.4)
CALCULATED R AXIS, ECG10: 28 DEGREES
CALCULATED T AXIS, ECG11: -149 DEGREES
CHLORIDE SERPL-SCNC: 95 MMOL/L (ref 98–107)
CO2 SERPL-SCNC: 26 MMOL/L (ref 21–32)
COLOR UR: ABNORMAL
CREAT SERPL-MCNC: 6.33 MG/DL (ref 0.6–1)
DIAGNOSIS, 93000: NORMAL
EPI CELLS #/AREA URNS HPF: ABNORMAL /HPF
GLUCOSE BLD STRIP.AUTO-MCNC: 105 MG/DL (ref 65–100)
GLUCOSE BLD STRIP.AUTO-MCNC: 114 MG/DL (ref 65–100)
GLUCOSE BLD STRIP.AUTO-MCNC: 119 MG/DL (ref 65–100)
GLUCOSE BLD STRIP.AUTO-MCNC: 141 MG/DL (ref 65–100)
GLUCOSE BLD STRIP.AUTO-MCNC: 174 MG/DL (ref 65–100)
GLUCOSE SERPL-MCNC: 117 MG/DL (ref 65–100)
GLUCOSE UR STRIP.AUTO-MCNC: NEGATIVE MG/DL
HGB UR QL STRIP: ABNORMAL
KETONES UR QL STRIP.AUTO: NEGATIVE MG/DL
LEUKOCYTE ESTERASE UR QL STRIP.AUTO: ABNORMAL
MAGNESIUM SERPL-MCNC: 1.9 MG/DL (ref 1.8–2.4)
MM INDURATION POC: 0 MM (ref 0–5)
NITRITE UR QL STRIP.AUTO: NEGATIVE
PH UR STRIP: 8 [PH] (ref 5–9)
POTASSIUM SERPL-SCNC: 5.7 MMOL/L (ref 3.5–5.1)
POTASSIUM SERPL-SCNC: 6.4 MMOL/L (ref 3.5–5.1)
POTASSIUM SERPL-SCNC: 6.9 MMOL/L (ref 3.5–5.1)
PPD POC: NEGATIVE NEGATIVE
PREALB SERPL-MCNC: 37.3 MG/DL (ref 18–35.7)
PROT UR STRIP-MCNC: 300 MG/DL
PTH-INTACT SERPL-MCNC: 157.5 PG/ML (ref 18.5–88)
Q-T INTERVAL, ECG07: 374 MS
QRS DURATION, ECG06: 84 MS
QTC CALCULATION (BEZET), ECG08: 465 MS
RBC #/AREA URNS HPF: ABNORMAL /HPF
SODIUM SERPL-SCNC: 132 MMOL/L (ref 136–145)
SP GR UR REFRACTOMETRY: 1.02 (ref 1–1.02)
UROBILINOGEN UR QL STRIP.AUTO: 0.2 EU/DL (ref 0.2–1)
VENTRICULAR RATE, ECG03: 93 BPM
WBC URNS QL MICRO: ABNORMAL /HPF

## 2018-09-25 PROCEDURE — 74011000250 HC RX REV CODE- 250: Performed by: INTERNAL MEDICINE

## 2018-09-25 PROCEDURE — 80048 BASIC METABOLIC PNL TOTAL CA: CPT

## 2018-09-25 PROCEDURE — 81001 URINALYSIS AUTO W/SCOPE: CPT

## 2018-09-25 PROCEDURE — 74011250637 HC RX REV CODE- 250/637: Performed by: INTERNAL MEDICINE

## 2018-09-25 PROCEDURE — 5A1D70Z PERFORMANCE OF URINARY FILTRATION, INTERMITTENT, LESS THAN 6 HOURS PER DAY: ICD-10-PCS | Performed by: INTERNAL MEDICINE

## 2018-09-25 PROCEDURE — 74011250636 HC RX REV CODE- 250/636: Performed by: NURSE PRACTITIONER

## 2018-09-25 PROCEDURE — 77030020250 HC SOL INJ D 5% LFCR 1000ML BG LF

## 2018-09-25 PROCEDURE — 84132 ASSAY OF SERUM POTASSIUM: CPT

## 2018-09-25 PROCEDURE — 36415 COLL VENOUS BLD VENIPUNCTURE: CPT

## 2018-09-25 PROCEDURE — 74011636637 HC RX REV CODE- 636/637: Performed by: NURSE PRACTITIONER

## 2018-09-25 PROCEDURE — 90935 HEMODIALYSIS ONE EVALUATION: CPT

## 2018-09-25 PROCEDURE — 82962 GLUCOSE BLOOD TEST: CPT

## 2018-09-25 PROCEDURE — 93005 ELECTROCARDIOGRAM TRACING: CPT | Performed by: NURSE PRACTITIONER

## 2018-09-25 PROCEDURE — 74011000250 HC RX REV CODE- 250: Performed by: NURSE PRACTITIONER

## 2018-09-25 PROCEDURE — 74011250637 HC RX REV CODE- 250/637: Performed by: NURSE PRACTITIONER

## 2018-09-25 PROCEDURE — 83735 ASSAY OF MAGNESIUM: CPT

## 2018-09-25 PROCEDURE — 65270000029 HC RM PRIVATE

## 2018-09-25 PROCEDURE — 74011000258 HC RX REV CODE- 258: Performed by: NURSE PRACTITIONER

## 2018-09-25 RX ORDER — SODIUM CHLORIDE, SODIUM LACTATE, POTASSIUM CHLORIDE, CALCIUM CHLORIDE 600; 310; 30; 20 MG/100ML; MG/100ML; MG/100ML; MG/100ML
75 INJECTION, SOLUTION INTRAVENOUS
Status: DISPENSED | OUTPATIENT
Start: 2018-09-26 | End: 2018-09-27

## 2018-09-25 RX ORDER — SODIUM POLYSTYRENE SULFONATE 15 G/60ML
15 SUSPENSION ORAL; RECTAL
Status: DISCONTINUED | OUTPATIENT
Start: 2018-09-25 | End: 2018-09-25

## 2018-09-25 RX ORDER — METOPROLOL TARTRATE 5 MG/5ML
2.5 INJECTION INTRAVENOUS ONCE
Status: COMPLETED | OUTPATIENT
Start: 2018-09-25 | End: 2018-09-25

## 2018-09-25 RX ORDER — DEXTROSE MONOHYDRATE 50 MG/ML
30 INJECTION, SOLUTION INTRAVENOUS CONTINUOUS
Status: DISCONTINUED | OUTPATIENT
Start: 2018-09-25 | End: 2018-09-25 | Stop reason: SDUPTHER

## 2018-09-25 RX ORDER — CEFAZOLIN SODIUM/WATER 2 G/20 ML
2 SYRINGE (ML) INTRAVENOUS
Status: COMPLETED | OUTPATIENT
Start: 2018-09-26 | End: 2018-09-26

## 2018-09-25 RX ORDER — SODIUM POLYSTYRENE SULFONATE 15 G/60ML
30 SUSPENSION ORAL; RECTAL
Status: COMPLETED | OUTPATIENT
Start: 2018-09-25 | End: 2018-09-25

## 2018-09-25 RX ORDER — DEXTROSE 50 % IN WATER (D50W) INTRAVENOUS SYRINGE
25
Status: COMPLETED | OUTPATIENT
Start: 2018-09-25 | End: 2018-09-25

## 2018-09-25 RX ORDER — SODIUM POLYSTYRENE SULFONATE 15 G/60ML
30 SUSPENSION ORAL; RECTAL
Status: DISCONTINUED | OUTPATIENT
Start: 2018-09-25 | End: 2018-09-25

## 2018-09-25 RX ORDER — DEXTROSE MONOHYDRATE 50 MG/ML
30 INJECTION, SOLUTION INTRAVENOUS CONTINUOUS
Status: DISCONTINUED | OUTPATIENT
Start: 2018-09-25 | End: 2018-09-26

## 2018-09-25 RX ORDER — LIDOCAINE AND PRILOCAINE 25; 25 MG/G; MG/G
CREAM TOPICAL AS NEEDED
Status: DISCONTINUED | OUTPATIENT
Start: 2018-09-25 | End: 2018-09-29 | Stop reason: HOSPADM

## 2018-09-25 RX ADMIN — ACETAMINOPHEN 650 MG: 325 TABLET ORAL at 06:42

## 2018-09-25 RX ADMIN — SEVELAMER CARBONATE 1600 MG: 800 TABLET, FILM COATED ORAL at 15:04

## 2018-09-25 RX ADMIN — CALCIUM GLUCONATE 1 G: 98 INJECTION, SOLUTION INTRAVENOUS at 09:10

## 2018-09-25 RX ADMIN — OXYCODONE HYDROCHLORIDE 5 MG: 5 TABLET ORAL at 06:42

## 2018-09-25 RX ADMIN — INSULIN HUMAN 10 UNITS: 100 INJECTION, SOLUTION PARENTERAL at 09:10

## 2018-09-25 RX ADMIN — Medication 10 ML: at 15:04

## 2018-09-25 RX ADMIN — DEXTROSE MONOHYDRATE 30 ML/HR: 5 INJECTION, SOLUTION INTRAVENOUS at 09:13

## 2018-09-25 RX ADMIN — SODIUM POLYSTYRENE SULFONATE 30 G: 15 SUSPENSION ORAL; RECTAL at 10:04

## 2018-09-25 RX ADMIN — SEVELAMER CARBONATE 1600 MG: 800 TABLET, FILM COATED ORAL at 18:56

## 2018-09-25 RX ADMIN — Medication 5 ML: at 06:42

## 2018-09-25 RX ADMIN — Medication 1 AMPULE: at 20:55

## 2018-09-25 RX ADMIN — TRAMADOL HYDROCHLORIDE 50 MG: 50 TABLET, FILM COATED ORAL at 15:09

## 2018-09-25 RX ADMIN — SENNOSIDES AND DOCUSATE SODIUM 1 TABLET: 8.6; 5 TABLET ORAL at 09:10

## 2018-09-25 RX ADMIN — METOPROLOL TARTRATE 25 MG: 25 TABLET, FILM COATED ORAL at 09:10

## 2018-09-25 RX ADMIN — ACETAMINOPHEN 650 MG: 325 TABLET ORAL at 15:03

## 2018-09-25 RX ADMIN — OXYCODONE HYDROCHLORIDE 5 MG: 5 TABLET ORAL at 10:52

## 2018-09-25 RX ADMIN — SEVELAMER CARBONATE 1600 MG: 800 TABLET, FILM COATED ORAL at 09:10

## 2018-09-25 RX ADMIN — Medication 1 AMPULE: at 09:12

## 2018-09-25 RX ADMIN — Medication 10 ML: at 20:55

## 2018-09-25 RX ADMIN — DEXTROSE MONOHYDRATE 25 G: 25 INJECTION, SOLUTION INTRAVENOUS at 09:09

## 2018-09-25 RX ADMIN — PANTOPRAZOLE SODIUM 40 MG: 40 TABLET, DELAYED RELEASE ORAL at 06:42

## 2018-09-25 RX ADMIN — DILTIAZEM HYDROCHLORIDE 60 MG: 60 TABLET, FILM COATED ORAL at 06:42

## 2018-09-25 RX ADMIN — METOROPROLOL TARTRATE 2.5 MG: 5 INJECTION, SOLUTION INTRAVENOUS at 20:55

## 2018-09-25 RX ADMIN — OXYCODONE HYDROCHLORIDE 5 MG: 5 TABLET ORAL at 01:09

## 2018-09-25 RX ADMIN — ACETAMINOPHEN 650 MG: 325 TABLET ORAL at 20:56

## 2018-09-25 NOTE — DIALYSIS
TRANSFER OUT -DIALYSIS Hemodialysis treatment completed without complications. Patient alert and VS stable  BP 98/55  P 120   
 
 0 Kgs removed. Needles X2 removed from access and manual pressure held until hemostasis complete and pressure dressing applied. Patient to [phone] after dialysis.

## 2018-09-25 NOTE — ANESTHESIA PREPROCEDURE EVALUATION
Anesthetic History PONV Review of Systems / Medical History Patient summary reviewed and pertinent labs reviewed Pulmonary Pertinent negatives: Smoker: ex. 
 
 Neuro/Psych Cardiovascular Hypertension: well controlled Valvular problems/murmurs (mild AS; mod TR): tricuspid insufficiency and aortic stenosis Dysrhythmias : atrial fibrillation Past MI, CAD, PAD and hyperlipidemia Comments: EF 55% On coumadin--off for 5 days GI/Hepatic/Renal 
  
GERD Renal disease (mWf): ESRD and CRI Comments: Crohn's--s/p colectomy; has ileostomy Endo/Other Anemia Other Findings Comments: gout Physical Exam 
 
Airway Mallampati: I 
TM Distance: 4 - 6 cm Neck ROM: normal range of motion Mouth opening: Normal 
 
 Cardiovascular Regular rate and rhythm,  S1 and S2 normal,  no murmur, click, rub, or gallop Rhythm: regular Rate: normal 
 
 
 
 Dental 
 
Dentition: Full upper dentures and Full lower dentures Pulmonary Breath sounds clear to auscultation Abdominal 
GI exam deferred Other Findings Anesthetic Plan ASA: 3 Anesthesia type: general 
 
 
 
 
Induction: Intravenous Anesthetic plan and risks discussed with: Patient

## 2018-09-25 NOTE — PROGRESS NOTES
Met with pt and daughter Pancho Garcia at bedside, pt is alert and oriented x3, lives alone in own home, 1 level, has ramp to enter, has walker, wc, motorized wc if needed at home, prior to fall pt was independent with ADLs, driving. Pt and daughter agreeable to STR referrals to [address] [place] [address], will fax referral after surgery and PT/OT works with pt. PPD placed, PT/OT pending surgery planned for 9/26/18 CM will remain available for DC needs. Care Management Interventions PCP Verified by CM: Yes Transition of Care Consult (CM Consult): Discharge Planning, SNF Current Support Network: Own Home, Lives Alone Confirm Follow Up Transport: Family Plan discussed with Pt/Family/Caregiver: Yes Freedom of Choice Offered: Yes Discharge Location Discharge Placement: Skilled nursing facility

## 2018-09-25 NOTE — PROGRESS NOTES
Pt transferred to dialysis, report was given to [facility] RN on the need for FSBS every hour starting at 1200.

## 2018-09-25 NOTE — PROGRESS NOTES
Monitor room called and said pt had 6 beat run of vtach. Hospitalist notified. Additional labs added. Lab notified to come draw. Will continue to monitor.

## 2018-09-25 NOTE — PROGRESS NOTES
Progress Note 2018 Admit Date: 2018  5:11 PM  
NAME: Jh Tran :  3/14/1933 MRN:  [id number] Attending: Nadine Grayson MD 
PCP:  Lizette Fowler NP Treatment Team: Attending Provider: Nadine Grayson MD; Consulting Provider: Son Hollis MD; Consulting Provider: Allen Garcia MD; Consulting Provider: Mook Alvarez MD; Charge Nurse: Ynes Osorio; Utilization Review: Yvonne Leos Full Code SUBJECTIVE:  
Ms. Charles Rollins is a 79 yo female with PMH of ESRD on HD (MWF), CAD, HTN, afib, aortic stenosis, CAD who presented with c/o left hip pain after sliding out of her chair and landing on the floor. She was found to have an acute mildly displaced left proximal femoral intertrochanteric fracture. CXR without acute findings. She was found to be hyperkalemic with K 7.2 despite having a full session of dialysis yesterday. Today K 6.4. Had 6 beat run of Vtach last night. EKG this morning with afib, PVCs. Pt denies CP, SOB, n/v/d, abd pain, dizziness. Past Medical History:  
Diagnosis Date  Abdominal pain, chronic, right lower quadrant 2013  Anticoagulated on Coumadin   
 was told to hold for 5 days- held as 10/15/16  Aortic stenosis 2015  
 mild to moderate per echo  Arthritis   
 minimal  
 Atrial fibrillation, chronic (HCC) 2016  CAD (coronary artery disease) 2013  
 mild MI   
 Carotid stenosis without Infarct 2016  Chronic anemia 2013  Chronic kidney disease ESRD stage 4, M, W, F dialysis. [facility] Alonso in  [facility]  Chronic renal failure 2016  Cro[facility]'s disease ([facility]   
 colectomy  Dyslipidemia 2013  Edema 2016  ESRD (end stage renal disease) on dialysis ([facility] 2013  
 right arm functioning- fistula----[facility]F [facility]  
 Former smoker  GERD (gastroesophageal reflux disease)   
 controlled on meds takes prilosec  Gout 2013  History of ileostomy 5/16/2013  History of peritonitis 2013  
 dialysis associated  History of sepsis 2009  Hypercholesteremia  Hyperlipidemia 1/19/2016  Hypertension   
  well controlled by medication  Hypokalemia 5/16/2013  Hypomagnesemia 6/23/2009  Ileostomy in place [facility])  Intractable nausea and vomiting 5/16/2013  Leukocytosis 5/16/2013  
 NSTEMI (non-ST elevated myocardial infarction) ([facility] 6/26/2013  Palpitations 1/19/2016  Paroxysmal atrial fibrillation ([facility] 1/19/2016  Paroxysmal tachycardia ([facility] 1/19/2016  S/P AAA repair \" aneurysm in my stomach repaired\"  Serum lipase elevation 5/16/2013  Tricuspid regurgitation EF 55-60%  
 moderate to severe per echo 6/26/15 Recent Results (from the past 24 hour(s)) TROPONIN I Collection Time: 09/24/18  4:37 PM  
Result Value Ref Range Troponin-I, Qt. 0.08 (H) 0.02 - 0.05 NG/ML  
CBC WITH AUTOMATED DIFF Collection Time: 09/24/18  4:37 PM  
Result Value Ref Range WBC 10.3 4.3 - 11.1 K/uL  
 RBC 3.70 (L) 4.05 - 5.2 M/uL  
 HGB 12.5 11.7 - 15.4 g/dL HCT 37.3 35.8 - 46.3 % .8 (H) 79.6 - 97.8 FL  
 MCH 33.8 (H) 26.1 - 32.9 PG  
 MCHC 33.5 31.4 - 35.0 g/dL  
 RDW 17.4 % PLATELET 401 947 - 550 K/uL MPV 9.8 9.4 - 12.3 FL ABSOLUTE NRBC 0.00 0.0 - 0.2 K/uL  
 DF AUTOMATED NEUTROPHILS 85 (H) 43 - 78 % LYMPHOCYTES 10 (L) 13 - 44 % MONOCYTES 5 4.0 - 12.0 % EOSINOPHILS 0 (L) 0.5 - 7.8 % BASOPHILS 0 0.0 - 2.0 % IMMATURE GRANULOCYTES 1 0.0 - 5.0 %  
 ABS. NEUTROPHILS 8.7 (H) 1.7 - 8.2 K/UL  
 ABS. LYMPHOCYTES 1.0 0.5 - 4.6 K/UL  
 ABS. MONOCYTES 0.5 0.1 - 1.3 K/UL  
 ABS. EOSINOPHILS 0.0 0.0 - 0.8 K/UL  
 ABS. BASOPHILS 0.0 0.0 - 0.2 K/UL  
 ABS. IMM. GRANS. 0.1 0.0 - 0.5 K/UL METABOLIC PANEL, COMPREHENSIVE Collection Time: 09/24/18  4:37 PM  
Result Value Ref Range Sodium 132 (L) 136 - 145 mmol/L  Potassium 7.2 (HH) 3.5 - 5.1 mmol/L  
 Chloride 96 (L) 98 - 107 mmol/L  
 CO2 26 21 - 32 mmol/L Anion gap 10 7 - 16 mmol/L Glucose 118 (H) 65 - 100 mg/dL BUN 19 8 - 23 MG/DL Creatinine 5.03 (H) 0.6 - 1.0 MG/DL  
 GFR est AA 11 (L) >60 ml/min/1.73m2 GFR est non-AA 9 (L) >60 ml/min/1.73m2 Calcium 8.9 8.3 - 10.4 MG/DL Bilirubin, total 0.5 0.2 - 1.1 MG/DL  
 ALT (SGPT) 30 12 - 65 U/L  
 AST (SGOT) 50 (H) 15 - 37 U/L Alk. phosphatase 97 50 - 136 U/L Protein, total 8.4 (H) 6.3 - 8.2 g/dL Albumin 3.6 3.2 - 4.6 g/dL Globulin 4.8 (H) 2.3 - 3.5 g/dL A-G Ratio 0.8 (L) 1.2 - 3.5 EKG, 12 LEAD, INITIAL Collection Time: 09/24/18  4:49 PM  
Result Value Ref Range Ventricular Rate 117 BPM  
 Atrial Rate 113 BPM  
 QRS Duration 70 ms Q-T Interval 294 ms QTC Calculation (Bezet) 410 ms Calculated R Axis 40 degrees Calculated T Axis -129 degrees Diagnosis Atrial fibrillation with rapid ventricular response ST & T wave abnormality, consider inferior ischemia ST & T wave abnormality, consider anterolateral ischemia Abnormal ECG When compared with ECG of 07-SEP-2018 12:20, Left bundle branch block is no longer Present Confirmed by ST SIRISHA RODRÍGUEZ MD (), JHONY DAVIDSON ([id number]) on 9/24/2018 8:48:07 PM 
  
POTASSIUM Collection Time: 09/24/18  7:07 PM  
Result Value Ref Range Potassium 4.8 3.5 - 5.1 mmol/L  
TYPE & SCREEN Collection Time: 09/24/18  7:07 PM  
Result Value Ref Range Crossmatch Expiration 09/27/2018 ABO/Rh(D) A POSITIVE Antibody screen POS Antibody ID ANTI-C   
 ANTIGEN TYPING C NEGATIVE, Unit number [id number] Blood component type RC LR Unit division 00 Status of unit REL FROM [facility] ANTIGEN/ANTIBODY INFO C NEGATIVE, Crossmatch result Compatible MSSA/MRSA SC BY PCR, NASAL SWAB Collection Time: 09/24/18  9:04 PM  
Result Value Ref Range Special Requests: NARES  Culture result: (A)    
 MRSA target DNA not detected, SA target DNA detected. A MRSA negative, SA positive test result does not preclude MRSA nasal colonization. PTH INTACT Collection Time: 09/24/18 10:50 PM  
Result Value Ref Range Calcium 8.4 8.3 - 10.4 MG/DL  
 PTH, Intact 157.5 (H) 18.5 - 88.0 pg/mL PREALBUMIN Collection Time: 09/24/18 10:50 PM  
Result Value Ref Range Prealbumin 37.3 (H) 18.0 - 35.7 MG/DL PROTHROMBIN TIME + INR Collection Time: 09/24/18 10:50 PM  
Result Value Ref Range Prothrombin time 13.9 11.5 - 14.5 sec INR 1.1 PTT Collection Time: 09/24/18 10:50 PM  
Result Value Ref Range aPTT 27.9 23.2 - 35.3 SEC URINALYSIS W/ RFLX MICROSCOPIC Collection Time: 09/25/18  4:53 AM  
Result Value Ref Range Color VINICIO Appearance CLOUDY Specific gravity 1.021 1.001 - 1.023    
 pH (UA) 8.0 5.0 - 9.0 Protein 300 (A) NEG mg/dL Glucose NEGATIVE  mg/dL Ketone NEGATIVE  NEG mg/dL Bilirubin NEGATIVE  NEG Blood MODERATE (A) NEG Urobilinogen 0.2 0.2 - 1.0 EU/dL Nitrites NEGATIVE  NEG Leukocyte Esterase LARGE (A) NEG    
 WBC 0-3 0 /hpf  
 RBC 5-10 0 /hpf Epithelial cells 0-3 0 /hpf Bacteria 1+ (H) 0 /hpf MAGNESIUM Collection Time: 09/25/18  6:33 AM  
Result Value Ref Range Magnesium 1.9 1.8 - 2.4 mg/dL METABOLIC PANEL, BASIC Collection Time: 09/25/18  6:33 AM  
Result Value Ref Range Sodium 132 (L) 136 - 145 mmol/L Potassium 6.4 (HH) 3.5 - 5.1 mmol/L Chloride 95 (L) 98 - 107 mmol/L  
 CO2 26 21 - 32 mmol/L Anion gap 11 7 - 16 mmol/L Glucose 117 (H) 65 - 100 mg/dL BUN 30 (H) 8 - 23 MG/DL Creatinine 6.33 (H) 0.6 - 1.0 MG/DL  
 GFR est AA 8 (L) >60 ml/min/1.73m2 GFR est non-AA 7 (L) >60 ml/min/1.73m2 Calcium 8.7 8.3 - 10.4 MG/DL  
EKG, 12 LEAD, INITIAL Collection Time: 09/25/18  8:32 AM  
Result Value Ref Range Ventricular Rate 93 BPM  
 Atrial Rate 105 BPM  
 QRS Duration 84 ms Q-T Interval 374 ms QTC Calculation (Bezet) 465 ms Calculated R Axis 28 degrees Calculated T Axis -149 degrees Diagnosis Atrial fibrillation with premature ventricular or aberrantly conducted  
complexes ST & T wave abnormality, consider inferior ischemia ST & T wave abnormality, consider anterolateral ischemia Abnormal ECG When compared with ECG of 25-SEP-2018 08:25, No significant change was found POTASSIUM Collection Time: 09/25/18  9:09 AM  
Result Value Ref Range Potassium 6.9 (HH) 3.5 - 5.1 mmol/L  
GLUCOSE, POC Collection Time: 09/25/18 10:14 AM  
Result Value Ref Range Glucose (POC) 174 (H) 65 - 100 mg/dL GLUCOSE, POC Collection Time: 09/25/18 10:53 AM  
Result Value Ref Range Glucose (POC) 114 (H) 65 - 100 mg/dL Allergies Allergen Reactions  Adhesive Other (comments) Skin tears Current Facility-Administered Medications Medication Dose Route Frequency Provider Last Rate Last Dose  dextrose 5% infusion  30 mL/hr IntraVENous CONTINUOUS Jarred Forde MD      
 [START ON 9/26/2018] ceFAZolin (ANCEF) 2 g/20 mL in sterile water IV syringe  2 g IntraVENous ON CALL TO OR Cristela Lambert NP      
AnaxagorasJayden [START ON 9/26/2018] lactated Ringers infusion  75 mL/hr IntraVENous ON CALL TO OR Chester Marquez NP      
 sodium chloride (NS) flush 5-10 mL  5-10 mL IntraVENous Q8H Joni Casanova MD   5 mL at 09/25/18 2098  sodium chloride (NS) flush 5-10 mL  5-10 mL IntraVENous PRN Nithin Romero MD      
 naloxone (NARCAN) injection 0.4 mg  0.4 mg IntraVENous PRN Joni Casanova MD      
 ondansetron (ZOFRAN) injection 4 mg  4 mg IntraVENous Q4H PRN Joni Casanova MD      
 senna-docusate (PERICOLACE) 8.6-50 mg per tablet 1 Tab  1 Tab Oral DAILY Joni Casanova MD   1 Tab at 09/25/18 0900  
 dilTIAZem (CARDIZEM) IR tablet 60 mg  60 mg Oral Omid Ayala MD   60 mg at 09/25/18 6277  metoprolol tartrate (LOPRESSOR) tablet 25 mg  25 mg Oral BID Radha Haley MD   25 mg at 18 0910  pantoprazole (PROTONIX) tablet 40 mg  40 mg Oral ACB Nithin Romero MD   40 mg at 18 0594  sevelamer carbonate (RENVELA) tab 1,600 mg  1,600 mg Oral TID WITH MEALS Nithin Romero MD   1,600 mg at 18 6859  ceFAZolin (ANCEF) 2 g/20 mL in sterile water IV syringe  2 g IntraVENous ON CALL TO OR Rock Saleem Marquez NP      
 lactated Ringers infusion  75 mL/hr IntraVENous ON CALL TO OR Hoa Estrada NP      
 acetaminophen (TYLENOL) tablet 650 mg  650 mg Oral Q8H Rock Saleem Marquez NP   650 mg at 18 1767  traMADol (ULTRAM) tablet 50 mg  50 mg Oral Q6H PRN Hoa Estrada NP      
 oxyCODONE IR (ROXICODONE) tablet 5 mg  5 mg Oral Q4H PRN Rock Saleem Marquez NP   5 mg at 18 1052  
 HYDROmorphone (PF) (DILAUDID) injection 0.5 mg  0.5 mg IntraVENous Q4H PRN Hoa Estrada NP      
 tuberculin injection 5 Units  5 Units IntraDERMal ONCE Hoa Estrada NP   5 Units at 18 2058  alcohol 62% (NOZIN) nasal  1 Ampule  1 Ampule Topical Q12H Hoa Estrada NP   1 Ampule at 18 8454 Review of Systems negative with exception of pertinent positives noted above PHYSICAL EXAM  
 
Visit Vitals  /68  Pulse 96  Temp 97.8 °F (36.6 °C)  Resp 16  
 Ht 5' 3\" (1.6 m)  Wt 64.9 kg (143 lb)  SpO2 97%  Breastfeeding No  
 BMI 25.33 kg/m2 Temp (24hrs), Av.6 °F (36.4 °C), Min:97.4 °F (36.3 °C), Max:97.8 °F (36.6 °C) Oxygen Therapy O2 Sat (%): 97 % (18 0750) Pulse via Oximetry: 126 beats per minute (18 1940) O2 Device: Room air (18) No intake or output data in the 24 hours ending 18 1109 General: No acute distress   
Lungs: CTA bilaterally. Resp even and nonlabored Heart:  S1S2 present with 3/6 murmur LUSB, no rubs gallops. Irregularly irregular. No edema Abdomen: Soft, non tender, non distended. BS present. Extremities: LLE in foam boot. No cyanosis, all ext warm/dry. 2+ pedal pulses bilaterally. Neurologic:  A/O X4. No focal deficits. Results summary of Diagnostic Studies/Procedures copied from within [facility] EMR: 
 
 
 
ASSESSMENT Active Hospital Problems Diagnosis Date Noted  Fall 09/24/2018  Closed fracture of left hip ([facility] [address] 09/24/2018  Fracture, hip ([facility] [address] 09/24/2018  Hypertension, benign 01/19/2016  CAD (coronary artery disease) 01/19/2016 ASCAD - 50% LAD and RCA by cath 2 years ago  [facility], [facility] 01/14/2016  Dyslipidemia 06/26/2013  Chronic anemia 06/26/2013  ESRD (end stage renal disease) on dialysis ([facility] 05/16/2013 Plan: 
 
Left hip fx:  Ortho consulted. Plans for repair tomorrow. CKD:  Nephrology consulted. MWF dialysis. Afib: Cardiology consulted. Continue lopressor, cardizem home doses. Hyperkalemia:  Start D50, kayexalate, insulin regular 10 units IV, calcium gluconate IV. Recheck K in 1 hour after meds given. Start D5 gtt and check BGL every 1 hour until stable glucose. Nephrology consulted, may need HD today, will defer. Obtain EKG. HTN:  Chronic. Continue home regimen Notes, labs, VS, diagnostic testing reviewed Time spent with pt 20 min DVT Prophylaxis: SCDs Plan of Care Discussed with: Supervising MD Dr. Yaritza Guzmán, care team, pt Javan Cohn, RANDALL Addendum: 
 
Repeat K drawn prior to meds being given. Repeat K at 1130

## 2018-09-25 NOTE — CONSULTS
Consult    Patient: Jesika Clifton MRN: [id number]  SSN: [id number]    YOB: 1933  Age: 80 y.o. Sex: female      Subjective: Jesika Clifton is a 80 y.o. female with a PMH of ESRD on MWF dialysis who was admitted for hip fracture after a fall at home yesterday. Patient's dialysis was apparently uneventful. She was at home with her daughter when she past out and fell off her chair. She denied shortness of breath or chest pain prior. On admission, she was found to have hyperkalemia to 7.2 which remains elevated this morning. Nephrology consulted for management of ESRD prior to operative repair of her hip fracture. She is complaining of leg pain, but denies CP or SOB currently. Past Medical History:   Diagnosis Date    Abdominal pain, chronic, right lower quadrant 5/16/2013    Anticoagulated on Coumadin     was told to hold for 5 days- held as 10/15/16    Aortic stenosis 06/26/2015    mild to moderate per echo     Arthritis     minimal    Atrial fibrillation, chronic ([address] 1/14/2016    CAD (coronary artery disease) 06/2013    mild MI     Carotid stenosis without Infarct 1/19/2016    Chronic anemia 6/26/2013    Chronic kidney disease     ESRD stage 4[facility] W[facility]    Chronic renal failure 1/19/2016    Crohn's disease ([address]     colectomy    Dyslipidemia 6/26/2013    Edema 1/19/2016    ESRD (end stage renal disease) on dialysis ([address] 05/16/2013    right arm functioning- [facility] [address]    Former smoker     GERD (gastroesophageal reflux disease)     controlled on meds takes prilosec    Gout 6/26/2013    History of ileostomy 5/16/2013    History of peritonitis 2013    dialysis associated    History of sepsis 2009    Hypercholesteremia     Hyperlipidemia 1/19/2016    Hypertension      well controlled by medication    Hypokalemia 5/16/2013    Hypomagnesemia 6/23/2009    Ileostomy in place ([address]     Intractable nausea and vomiting 5/16/2013    Leukocytosis 5/16/2013    NSTEMI (non-ST elevated myocardial infarction) ([facility] 6/26/2013    Palpitations 1/19/2016    Paroxysmal atrial fibrillation ([facility] 1/19/2016    Paroxysmal tachycardia ([facility] 1/19/2016    S/P AAA repair     \" aneurysm in my stomach repaired\"    Serum lipase elevation 5/16/2013    Tricuspid regurgitation EF 55-60%    moderate to severe per echo 6/26/15     Past Surgical History:   Procedure Laterality Date    ABDOMEN SURGERY PROC UNLISTED      insertion and removal of infected PD cath    COLONOSCOPY N/A 10/20/2016    ILEOSCOPY THROUGH OSTOMY/ 25 performed by Anabell Fuchs MD at [address] [facility] HX AAA REPAIR  2013    HX BREAST BIOPSY Bilateral       x3 on left breast    HX CATARACT REMOVAL Bilateral 2005    wit IOL    HX COLECTOMY  1983    colon removed / ileostomy    HX LAP CHOLECYSTECTOMY  2013    VASCULAR SURGERY PROCEDURE UNLIST Left 2009    fistulagram 8/09, LUE    VASCULAR SURGERY PROCEDURE UNLIST      7/09 L subclavian hemodialysis cath    VASCULAR SURGERY PROCEDURE UNLIST Right 2014    fistula      Family History   Problem Relation Age of Onset    Cancer Mother     Stroke Father     Hypertension Father     Heart Disease Brother     Heart Disease Brother     Stroke Sister     Hypertension Sister      Social History   Substance Use Topics    Smoking status: Former Smoker     Packs/day: 0.25     Years: 5.00     Quit date: 8/22/1971    Smokeless tobacco: Never Used      Comment: quit years ago    Alcohol use No      Current Facility-Administered Medications   Medication Dose Route Frequency Provider Last Rate Last Dose    dextrose 5% infusion  30 mL/hr IntraVENous CONTINUOUS Sharron Hammer MD        [START ON 9/26/2018] ceFAZolin (ANCEF) 2 g/20 mL in sterile water IV syringe  2 g IntraVENous ON CALL TO OR Marie Fleischer Cox, NP        [START ON 9/26/2018] lactated Ringers infusion  75 mL/hr IntraVENous ON CALL TO OR Melanie Hutchinson NP  sodium chloride (NS) flush 5-10 mL  5-10 mL IntraVENous Q8H Marian Jurado MD   5 mL at 09/25/18 0642    sodium chloride (NS) flush 5-10 mL  5-10 mL IntraVENous PRN Marian Jurado MD        naloxone (NARCAN) injection 0.4 mg  0.4 mg IntraVENous PRN Marian Jurado MD        ondansetron (ZOFRAN) injection 4 mg  4 mg IntraVENous Q4H PRN Marian Jurado MD        senna-docusate (PERICOLACE) 8.6-50 mg per tablet 1 Tab  1 Tab Oral DAILY Marian Jurado MD   1 Tab at 09/25/18 0910    dilTIAZem (CARDIZEM) IR tablet 60 mg  60 mg Oral TIDAC Nithin Romero MD   60 mg at 09/25/18 3031    metoprolol tartrate (LOPRESSOR) tablet 25 mg  25 mg Oral BID Marian Jurado MD   25 mg at 09/25/18 0910    pantoprazole (PROTONIX) tablet 40 mg  40 mg Oral ACB Marian Jurado MD   40 mg at 09/25/18 2616    sevelamer carbonate (RENVELA) tab 1,600 mg  1,600 mg Oral TID WITH MEALS Nithin Romero MD   1,600 mg at 09/25/18 0910    ceFAZolin (ANCEF) 2 g/20 mL in sterile water IV syringe  2 g IntraVENous ON CALL TO OR Rik Marquez NP        lactated Ringers infusion  75 mL/hr IntraVENous ON CALL TO OR Rik Marquez NP        acetaminophen (TYLENOL) tablet 650 mg  650 mg Oral Q8H Sol Marquez NP   650 mg at 09/25/18 9191    traMADol (ULTRAM) tablet 50 mg  50 mg Oral Q6H PRN Juanjo Villar NP        oxyCODONE IR (ROXICODONE) tablet 5 mg  5 mg Oral Q4H PRN Rikluciana Marquez NP   5 mg at 09/25/18 1052    HYDROmorphone (PF) (DILAUDID) injection 0.5 mg  0.5 mg IntraVENous Q4H PRN Juanjo Villar NP        tuberculin injection 5 Units  5 Units IntraDERMal ONCE Gaston Cassi Marquez NP   5 Units at 09/24/18 2058    alcohol 62% (NOZIN) nasal  1 Ampule  1 Ampule Topical Q12H Juanjo Villar NP   1 Ampule at 09/25/18 0912        Allergies   Allergen Reactions    Adhesive Other (comments)     Skin tears         Review of Systems:  14pt ROS negative other than HPI    Objective:     Vitals:    09/24/18 3257 09/25/18 0007 09/25/18 0408 09/25/18 0750   BP: 92/50 100/67 107/64 104/68   Pulse: (!) 116 91 89 96   Resp: 18 18 18 16   Temp: 97.6 °F (36.4 °C) 97.5 °F (36.4 °C) 97.7 °F (36.5 °C) 97.8 °F (36.6 °C)   SpO2: 98% 97% 97% 97%   Weight:       Height:            Physical Exam:  GEN: elderly woman laying in bed  HEENT: NCAT, EOMI  CV: irregular. No m/r/g  Lungs: CTAB  Abd: S/NT/ND +BS  Ext: no edema. Access: RUE AVF with palpable thrill  Assessment:     Hospital Problems  Date Reviewed: 9/8/2018          Codes Class Noted POA    Fall ICD-10-CM: W19. Bonnie Coy  ICD-9-CM: E888.9  9/24/2018 Unknown        * (Principal)[facility] ICD-10-CM: S72.002A  ICD-9-CM: 820.8  9/24/2018 Unknown        Fracture, hip ([facility] ICD-10-CM: S72.009A  ICD-9-CM: 820.8  9/24/2018 Unknown        Hypertension, benign ICD-10-CM: I10  ICD-9-CM: 401.1  1/19/2016 Yes        CAD (coronary artery disease) ICD-10-CM: I25.10  ICD-9-CM: 414.00  1/19/2016 Yes    Overview Addendum 1/19/2016  1:33 PM by Crystal Curran - 50% LAD and RCA by cath 2 years ago               Atrial fibrillation, chronic ([facility] ICD-10-CM: I48.2  ICD-9-CM: 427.31  1/14/2016 Yes        Dyslipidemia (Chronic) ICD-10-CM: E78.5  ICD-9-CM: 272.4  6/26/2013 Yes        Chronic anemia (Chronic) ICD-10-CM: D64.9  ICD-9-CM: 285.9  6/26/2013 Yes        ESRD (end stage renal disease) on dialysis [facility]) ICD-10-CM: N18.6, Z99.2  ICD-9-CM: 585.6, V45.11  5/16/2013 Yes              Plan:   1) ESRD- plan on HD today for HyperK. WIll plan on HD again tomorrow prior to OR. 2) Hyperkalemia-  As above HD today/tomorrow with low K bath    3) Hip fracture- to OR with ortho, hopeful after dialysis tomorrow. Signed By: Shelton Jiang MD     September 25, 2018    Patient seen on dialysis today. BP low, but patient is asymptomatic. Apparently this is typical for her. No UF with dialysis today.   Repeat in AM.

## 2018-09-25 NOTE — PROGRESS NOTES
ORTHO: 
 
PATIENT IS NOT MEDICALLY STABLE OR CLEARED FOR HIP SURGERY TODAY. SURGERY PLANNED FOR TOMORROW.  
 
NPO AFTER MIDNIGHT PLEASE.

## 2018-09-25 NOTE — PROGRESS NOTES
Spiritual Care Visit, initial visit. Patient was off the floor.  left a card. Visit by Mino Lea, Staff .  Michelle., Carlos, B.A.

## 2018-09-25 NOTE — PROGRESS NOTES
09/24/18 2117 Dual Skin Pressure Injury Assessment Dual Skin Pressure Injury Assessment WDL Second Care Provider (Based on [address] [place] [address]) Samir De Dios RN Skin Integumentary Skin Integumentary (WDL) X Skin Color Ecchymosis (comment) (Scattered: legs, abdomen, and back ) Skin Condition/Temp Warm;Dry;Flaky Skin Integrity Laceration (comment); Excoriation (L head: R head)

## 2018-09-25 NOTE — CONSULTS
[address] Cardiology Consult                Date of  Admission: 9/24/2018  5:11 PM     Primary Care Physician: Dr. Rupert Whitman  Primary Cardiologist:  Dr. Rodrigo Mchugh  Referring Physician:  Dr. Dorice Shone Physician:  Dr. Dave Vargas    CC/Reason for consult:  Pre op evaluation, CAD, permanent devora Clifton is a 80 y.o. female with PMH of CAD ([address] 2016 50% stenosis of LAD and RCA, Nuclear stress test 7/18 showed no ischemia), perm AF (no AC per patient refusal to take), DSL, chronic anemia, ESRD with HD (M, W, F), and CAD (carotid US [address] <53% and LICA 53-30%), who presented to the ED after a fall at home. Patient reports going to dialysis and coming home to have lunch with her daughter. She states she is unsure what happened but she fell out of her chair. Per review of records her daughter stated she was sitting to close to the edge of the chair. Patient does not feel she became dizzy or felt palpitations prior to fall. After the fall she had left hip pain. Xray in the ED showed acute mildly displaced left proximal femoral intertrochanteric fracture. Ortho was consulted and plans to take her to OR today. Initial lab work in the ED showed H&H 12.5/37.5, plt 178, Na 132, K 7.2, creatinine 5.03 and BUN 19. Echo from 7/2018 showed EF of 55-60% with no WMA, mild aortic stenosis with valve area of 1.3, mild MR and mod to severe TR. This morning her potassium remains elevated at 6.4. Nephrology has been consulted. Denies recent chest pain or shortness of breath.       Patient Active Problem List   Diagnosis Code    Hypomagnesemia E83.42    Pleural effusion, left J90    Crohn's disease ([address] K50.90    Intractable nausea and vomiting R11.2    Abdominal pain, chronic, right lower quadrant R10.31, G89.29    Leukocytosis D72.829    ESRD (end stage renal disease) on dialysis ([address] N18.6, Z99.2    Hypokalemia E87.6    Serum lipase elevation R74.8    History of ileostomy Z98.890    Peritonitis, dialysis-associated ([address] T85.71XA    Symptomatic cholelithiasis K80.20    NSTEMI (non-ST elevated myocardial infarction) ([address] I21.4    Dyslipidemia E78.5    Gout M10.9    Chronic anemia D64.9    Atrial fibrillation, chronic (HCC) I48.2    Edema R60.9    Anemia D64.9    Chronic renal failure N18.9    Hypertension, benign I10    Aortic stenosis I35.0    Palpitations R00.2    Carotid stenosis without Infarct I65.29    CAD (coronary artery disease) I25.10    Hyperlipidemia E78.5    Paroxysmal tachycardia (HCC) I47.9    Paroxysmal atrial fibrillation (HCC) I48.0    Shortness of breath R06.02    Pulmonary edema J81.1    Pleural effusion, right J90    Hyperkalemia H81.0    Metabolic acidosis X56.9    ESRD (end stage renal disease) (HCC) N18.6    Chest wall contusion, right, sequela S20.211S    Hemodialysis-associated hypotension I95.3    Fall W19. Giancarlo Romoach    Closed fracture of left hip ([address] S72.002A    Fracture, hip ([address] S72.009A       Past Medical History:   Diagnosis Date    Abdominal pain, chronic, right lower quadrant 5/16/2013    Anticoagulated on Coumadin     was told to hold for 5 days- held as 10/15/16    Aortic stenosis 06/26/2015    mild to moderate per echo     Arthritis     minimal    Atrial fibrillation, chronic ([address] 1/14/2016    CAD (coronary artery disease) 06/2013    mild MI     Carotid stenosis without Infarct 1/19/2016    Chronic anemia 6/26/2013    Chronic kidney disease     ESRD stage 4, M, W, F dialysis. Shahida Copper  in  [facility]    Chronic renal failure 1/19/2016    Crohn's disease ([address]     colectomy    Dyslipidemia 6/26/2013    Edema 1/19/2016    ESRD (end stage renal disease) on dialysis ([address] 05/16/2013    right arm functioning- fistula----M-W-F AT [id number] DIALYSIS    Former smoker     GERD (gastroesophageal reflux disease)     controlled on meds takes prilosec    Gout 6/26/2013    History of ileostomy 5/16/2013    History of peritonitis 2013    dialysis associated    History of sepsis 2009    Hypercholesteremia     Hyperlipidemia 1/19/2016    Hypertension      well controlled by medication    Hypokalemia 5/16/2013    Hypomagnesemia 6/23/2009    Ileostomy in place [facility])     Intractable nausea and vomiting 5/16/2013    Leukocytosis 5/16/2013    NSTEMI (non-ST elevated myocardial infarction) ([address] 6/26/2013    Palpitations 1/19/2016    Paroxysmal atrial fibrillation [address] 1/19/2016    Paroxysmal tachycardia ([facility] 1/19/2016    S/P AAA repair     \" aneurysm in my stomach repaired\"    Serum lipase elevation 5/16/2013    Tricuspid regurgitation EF 55-60%    moderate to severe per echo 6/26/15      Past Surgical History:   Procedure Laterality Date    ABDOMEN SURGERY PROC UNLISTED      insertion and removal of infected PD cath    COLONOSCOPY N/A 10/20/2016    ILEOSCOPY THROUGH OSTOMY/ 25 performed by Saul [facility] at [facility] HX AAA REPAIR  2013    HX BREAST BIOPSY Bilateral       x3 on left breast    HX CATARACT REMOVAL Bilateral 2005    wit IOL    HX COLECTOMY  1983    colon removed / ileostomy    HX LAP CHOLECYSTECTOMY  2013    VASCULAR SURGERY PROCEDURE UNLIST Left 2009    fistulagram 8/09, LUE    VASCULAR SURGERY PROCEDURE UNLIST      7/09 L subclavian hemodialysis cath    VASCULAR SURGERY PROCEDURE UNLIST Right 2014    fistula     Allergies   Allergen Reactions    Adhesive Other (comments)     Skin tears        Family History   Problem Relation Age of Onset    Cancer Mother     Stroke Father     Hypertension Father     Heart Disease Brother     Heart Disease Brother     Stroke Sister     Hypertension Sister         Current Facility-Administered Medications   Medication Dose Route Frequency    sodium polystyrene (KAYEXALATE) 15 gram/60 mL oral suspension 15 g  15 g Oral NOW    dextrose (D50W) injection syrg 25 g  25 g IntraVENous NOW    insulin regular (NOVOLIN R, HUMULIN R) injection 10 Units  10 Units IntraVENous NOW  calcium gluconate 1 g in 0.9% sodium chloride 100 mL IVPB  1 g IntraVENous NOW    dextrose 5% infusion  30 mL/hr IntraVENous CONTINUOUS    dextrose 5% infusion  30 mL/hr IntraVENous CONTINUOUS    sodium chloride (NS) flush 5-10 mL  5-10 mL IntraVENous Q8H    sodium chloride (NS) flush 5-10 mL  5-10 mL IntraVENous PRN    naloxone (NARCAN) injection 0.4 mg  0.4 mg IntraVENous PRN    ondansetron (ZOFRAN) injection 4 mg  4 mg IntraVENous Q4H PRN    senna-docusate (PERICOLACE) 8.6-50 mg per tablet 1 Tab  1 Tab Oral DAILY    dilTIAZem (CARDIZEM) IR tablet 60 mg  60 mg Oral TIDAC    metoprolol tartrate (LOPRESSOR) tablet 25 mg  25 mg Oral BID    pantoprazole (PROTONIX) tablet 40 mg  40 mg Oral ACB    sevelamer carbonate (RENVELA) tab 1,600 mg  1,600 mg Oral TID WITH MEALS    ceFAZolin (ANCEF) 2 g/20 mL in sterile water IV syringe  2 g IntraVENous ON CALL TO OR    lactated Ringers infusion  75 mL/hr IntraVENous ON CALL TO OR    acetaminophen (TYLENOL) tablet 650 mg  650 mg Oral Q8H    traMADol (ULTRAM) tablet 50 mg  50 mg Oral Q6H PRN    oxyCODONE IR (ROXICODONE) tablet 5 mg  5 mg Oral Q4H PRN    HYDROmorphone (PF) (DILAUDID) injection 0.5 mg  0.5 mg IntraVENous Q4H PRN    tuberculin injection 5 Units  5 Units IntraDERMal ONCE    alcohol 62% (NOZIN) nasal  1 Ampule  1 Ampule Topical Q12H       Review of Systems   HENT: Negative. Eyes: Negative. Respiratory: Negative. Cardiovascular: Negative. Gastrointestinal: Negative. Genitourinary: Negative. Musculoskeletal: Positive for falls and joint pain. Skin: Negative. Neurological: Positive for weakness. Negative for dizziness and loss of consciousness. Endo/Heme/Allergies: Negative. Psychiatric/Behavioral: Negative.          Physical Exam  Vitals:    09/24/18 2313 09/25/18 0007 09/25/18 0408 09/25/18 0750   BP: 92/50 100/67 107/64 (!) 167/97   Pulse: (!) 116 91 89 71   Resp: 18 18 18 18   Temp: 97.6 °F (36.4 °C) 97.5 °F (36.4 °C) 97.7 °F (36.5 °C) 99.3 °F (37.4 °C)   SpO2: 98% 97% 97% 97%   Weight:       Height:           Physical Exam:  Physical Exam   Constitutional: She is oriented to person, place, and time. She appears unhealthy. HENT:   Head: Normocephalic. Eyes: Pupils are equal, round, and reactive to light. Neck: Normal range of motion. Cardiovascular: Normal rate. An irregularly irregular rhythm present. Pulmonary/Chest: Effort normal. She has decreased breath sounds. Abdominal: Soft. Bowel sounds are normal.   Musculoskeletal:        Left hip: She exhibits decreased range of motion and tenderness. Neurological: She is alert and oriented to person, place, and time. Skin: Skin is warm and dry. Psychiatric: Mood, memory, affect and judgment normal.       Cardiographics    Telemetry: AFIB  ECG: atrial fibrillation, rate 93  Echocardiogram: 7-31-18     Left ventricle: Systolic function was normal. Ejection fraction was  estimated in the range of 55 % to 60 %. There were no regional wall motion  abnormalities. There was moderate concentric hypertrophy. -  Left atrium: The atrium was moderately dilated. -  Aortic valve: There was mild stenosis. The aortic valve area by the  continuity equation was 1.3 cm2.    -  Mitral valve: There was mild regurgitation. -  Tricuspid valve: There was moderate to severe regurgitation.   Labs:   Recent Labs      09/25/18   0633  09/24/18   2250  09/24/18   1907  09/24/18   1637   NA  132*   --    --   132*   K  6.4*   --   4.8  7.2*   MG  1.9   --    --    --    BUN  30*   --    --   19   CREA  6.33*   --    --   5.03*   GLU  117*   --    --   118*   WBC   --    --    --   10.3   HGB   --    --    --   12.5   HCT   --    --    --   37.3   PLT   --    --    --   178   INR   --   1.1   --    --         Assessment/Plan:     Assessment:      Principal Problem:    Closed fracture of left hip -- ortho plans to take to surgery    Active Problems:    ESRD (end stage renal disease) on dialysis-- nephrology consulted. K was 7.2 after dialysis yesterday and remains elevated at 6.4 this AM.  May need HD run prior to surgery. Chronic anemia -- monitor H&H      Atrial fibrillation, chronic-- no AC per pt refusal.  Rate controlled currently in 90s. Continue cardizem and metoprolol. Hypertension, benign-- stable      CAD (coronary artery disease) -- no anginal symptoms. Continue ASA. Overview: ASCAD - 50% LAD and RCA by cath 2 years ago            Fall-- see above      Fracture, hip -- see above    Surgical risk is moderate. Recent nuclear stress test with no ischemia and Echo with normal EF. Thank you very much for this referral. We appreciate the opportunity to participate in this patient's care. We will follow along with above stated plan.     Gallo Kenyon NP  Consulting MD: Alden Vaughn

## 2018-09-25 NOTE — DIALYSIS
TRANSFER IN - DIALYSIS Received patient in dialysis unit from room 729 (unit) for ordered procedure. Consent verified for renal replacement therapy. Patient alert and oriented x 4 and vital signs stable. BP 94/54 P 98 Hemodialysis initiated using right upper arm AVF and 15 g needles. Machine settings per MD order. No Heparin used this treatment. Will monitor during treatment.

## 2018-09-25 NOTE — PROGRESS NOTES
TRANSFER - IN REPORT: 
 
Verbal report received from [facility] (name) on Edita Ceja  being received from ED (unit) for routine progression of care Report consisted of patients Situation, Background, Assessment and  
Recommendations(SBAR). Information from the following report(s) SBAR, Kardex, Intake/Output, MAR and Recent Results was reviewed with the receiving nurse. Opportunity for questions and clarification was provided. Assessment to be completed upon patients arrival to unit and care assumed.

## 2018-09-25 NOTE — ED NOTES
TRANSFER - OUT REPORT: 
 
Verbal report given to Pierre Vazquez(name) on Lupillo Mccartneyed  being transferred to 729(unit) for routine progression of care Report consisted of patients Situation, Background, Assessment and  
Recommendations(SBAR). Information from the following report(s) SBAR, ED Summary and Recent Results was reviewed with the receiving nurse. Lines:  
Peripheral IV 09/24/18 Left Wrist (Active) Site Assessment Clean, dry, & intact 9/24/2018  5:58 PM  
Phlebitis Assessment 0 9/24/2018  5:58 PM  
Infiltration Assessment 0 9/24/2018  5:58 PM  
Dressing Status Clean, dry, & intact 9/24/2018  5:58 PM  
Hub Color/Line Status Pink 9/24/2018  5:58 PM  
Alcohol Cap Used No 9/24/2018  5:58 PM  
  
 
Opportunity for questions and clarification was provided.

## 2018-09-25 NOTE — PROGRESS NOTES
ORTHO: 
 
PATIENT TO BE ADMITTED BY HOSPITALIST TO ROOM 729 FOR LEFT HIP FRACTURE. SURGERY PLANNED WITH DR. SHABAZZ TOMORROW. PLEASE KEEP NPO AFTER MIDNIGHT.

## 2018-09-25 NOTE — PROGRESS NOTES
Problem: Interdisciplinary Rounds Goal: Interdisciplinary Rounds Outcome: Progressing Towards Goal 
Interdisciplinary team rounds were held 9/25/2018 with the following team members:Care Management, Nurse Practitioner, Physical Therapy and . Surgery planned for tomorrow. Plan of care discussed. See clinical pathway and/or care plan for interventions and desired outcomes.

## 2018-09-25 NOTE — PROGRESS NOTES
Problem: Falls - Risk of 
Goal: *Absence of Falls Document Jennifer CarusoStacy Fall Risk and appropriate interventions in the flowsheet. Outcome: Progressing Towards Goal 
Fall Risk Interventions: 
  
 
Mentation Interventions: Bed/chair exit alarm, Door open when patient unattended, Reorient patient, Update white board, Adequate sleep, hydration, pain control Medication Interventions: Bed/chair exit alarm, Evaluate medications/consider consulting pharmacy, Patient to call before getting OOB Elimination Interventions: Call light in reach, Bed/chair exit alarm, Patient to call for help with toileting needs History of Falls Interventions: Bed/chair exit alarm, Door open when patient unattended, Investigate reason for fall Problem: Pressure Injury - Risk of 
Goal: *Prevention of pressure injury Document Domenico Scale and appropriate interventions in the flowsheet. Outcome: Progressing Towards Goal 
Pressure Injury Interventions: 
Sensory Interventions: Assess changes in LOC, Check visual cues for pain, Discuss PT/OT consult with provider, Keep linens dry and wrinkle-free, Pressure redistribution bed/mattress (bed type) Activity Interventions: Pressure redistribution bed/mattress(bed type), PT/OT evaluation Mobility Interventions: Pressure redistribution bed/mattress (bed type), PT/OT evaluation, HOB 30 degrees or less Nutrition Interventions: Document food/fluid/supplement intake, Offer support with meals,snacks and hydration

## 2018-09-26 ENCOUNTER — APPOINTMENT (OUTPATIENT)
Dept: GENERAL RADIOLOGY | Age: 83
DRG: 480 | End: 2018-09-26
Attending: ORTHOPAEDIC SURGERY
Payer: MEDICARE

## 2018-09-26 ENCOUNTER — ANESTHESIA (OUTPATIENT)
Dept: SURGERY | Age: 83
DRG: 480 | End: 2018-09-26
Payer: MEDICARE

## 2018-09-26 LAB
25(OH)D3+25(OH)D2 SERPL-MCNC: 20.1 NG/ML (ref 30–100)
ALBUMIN SERPL-MCNC: 3.1 G/DL (ref 3.2–4.6)
ALBUMIN/GLOB SERPL: 0.8 {RATIO} (ref 1.2–3.5)
ALP SERPL-CCNC: 91 U/L (ref 50–136)
ALT SERPL-CCNC: 22 U/L (ref 12–65)
ANION GAP SERPL CALC-SCNC: 13 MMOL/L (ref 7–16)
AST SERPL-CCNC: 28 U/L (ref 15–37)
BASOPHILS # BLD: 0 K/UL (ref 0–0.2)
BASOPHILS NFR BLD: 0 % (ref 0–2)
BILIRUB SERPL-MCNC: 0.6 MG/DL (ref 0.2–1.1)
BUN SERPL-MCNC: 24 MG/DL (ref 8–23)
CALCIUM SERPL-MCNC: 8.4 MG/DL (ref 8.3–10.4)
CHLORIDE SERPL-SCNC: 98 MMOL/L (ref 98–107)
CO2 SERPL-SCNC: 25 MMOL/L (ref 21–32)
CREAT SERPL-MCNC: 5.58 MG/DL (ref 0.6–1)
DIFFERENTIAL METHOD BLD: ABNORMAL
EOSINOPHIL # BLD: 0 K/UL (ref 0–0.8)
EOSINOPHIL NFR BLD: 0 % (ref 0.5–7.8)
ERYTHROCYTE [DISTWIDTH] IN BLOOD BY AUTOMATED COUNT: 17.3 %
GLOBULIN SER CALC-MCNC: 3.9 G/DL (ref 2.3–3.5)
GLUCOSE BLD STRIP.AUTO-MCNC: 133 MG/DL (ref 65–100)
GLUCOSE BLD STRIP.AUTO-MCNC: 133 MG/DL (ref 65–100)
GLUCOSE BLD STRIP.AUTO-MCNC: 139 MG/DL (ref 65–100)
GLUCOSE BLD STRIP.AUTO-MCNC: 159 MG/DL (ref 65–100)
GLUCOSE SERPL-MCNC: 127 MG/DL (ref 65–100)
HCT VFR BLD AUTO: 32 % (ref 35.8–46.3)
HGB BLD-MCNC: 10.2 G/DL (ref 11.7–15.4)
IMM GRANULOCYTES # BLD: 0 K/UL (ref 0–0.5)
IMM GRANULOCYTES NFR BLD AUTO: 0 % (ref 0–5)
LYMPHOCYTES # BLD: 1 K/UL (ref 0.5–4.6)
LYMPHOCYTES NFR BLD: 14 % (ref 13–44)
MCH RBC QN AUTO: 33.6 PG (ref 26.1–32.9)
MCHC RBC AUTO-ENTMCNC: 31.9 G/DL (ref 31.4–35)
MCV RBC AUTO: 105.3 FL (ref 79.6–97.8)
MM INDURATION POC: 0 MM (ref 0–5)
MONOCYTES # BLD: 0.8 K/UL (ref 0.1–1.3)
MONOCYTES NFR BLD: 10 % (ref 4–12)
NEUTS SEG # BLD: 5.5 K/UL (ref 1.7–8.2)
NEUTS SEG NFR BLD: 75 % (ref 43–78)
NRBC # BLD: 0 K/UL (ref 0–0.2)
PLATELET # BLD AUTO: 173 K/UL (ref 150–450)
PMV BLD AUTO: 10.5 FL (ref 9.4–12.3)
POTASSIUM SERPL-SCNC: 5 MMOL/L (ref 3.5–5.1)
PPD POC: NEGATIVE NEGATIVE
PROT SERPL-MCNC: 7 G/DL (ref 6.3–8.2)
RBC # BLD AUTO: 3.04 M/UL (ref 4.05–5.2)
SODIUM SERPL-SCNC: 136 MMOL/L (ref 136–145)
WBC # BLD AUTO: 7.4 K/UL (ref 4.3–11.1)

## 2018-09-26 PROCEDURE — 76010000160 HC OR TIME 0.5 TO 1 HR INTENSV-TIER 1: Performed by: ORTHOPAEDIC SURGERY

## 2018-09-26 PROCEDURE — 74011250636 HC RX REV CODE- 250/636: Performed by: NURSE PRACTITIONER

## 2018-09-26 PROCEDURE — 77030002933 HC SUT MCRYL J&J -A: Performed by: ORTHOPAEDIC SURGERY

## 2018-09-26 PROCEDURE — 74011000250 HC RX REV CODE- 250: Performed by: INTERNAL MEDICINE

## 2018-09-26 PROCEDURE — 0QS736Z REPOSITION LEFT UPPER FEMUR WITH INTRAMEDULLARY INTERNAL FIXATION DEVICE, PERCUTANEOUS APPROACH: ICD-10-PCS | Performed by: ORTHOPAEDIC SURGERY

## 2018-09-26 PROCEDURE — 74011250636 HC RX REV CODE- 250/636

## 2018-09-26 PROCEDURE — 77030014405 HC GD ROD RMR SYNT -C: Performed by: ORTHOPAEDIC SURGERY

## 2018-09-26 PROCEDURE — 82962 GLUCOSE BLOOD TEST: CPT

## 2018-09-26 PROCEDURE — 77030018836 HC SOL IRR NACL ICUM -A: Performed by: ORTHOPAEDIC SURGERY

## 2018-09-26 PROCEDURE — 85025 COMPLETE CBC W/AUTO DIFF WBC: CPT

## 2018-09-26 PROCEDURE — 74011250636 HC RX REV CODE- 250/636: Performed by: ANESTHESIOLOGY

## 2018-09-26 PROCEDURE — 77030020263 HC SOL INJ SOD CL0.9% LFCR 1000ML

## 2018-09-26 PROCEDURE — 65660000000 HC RM CCU STEPDOWN

## 2018-09-26 PROCEDURE — 73552 X-RAY EXAM OF FEMUR 2/>: CPT

## 2018-09-26 PROCEDURE — 74011250637 HC RX REV CODE- 250/637: Performed by: NURSE PRACTITIONER

## 2018-09-26 PROCEDURE — C1713 ANCHOR/SCREW BN/BN,TIS/BN: HCPCS | Performed by: ORTHOPAEDIC SURGERY

## 2018-09-26 PROCEDURE — 76210000016 HC OR PH I REC 1 TO 1.5 HR: Performed by: ORTHOPAEDIC SURGERY

## 2018-09-26 PROCEDURE — 36415 COLL VENOUS BLD VENIPUNCTURE: CPT

## 2018-09-26 PROCEDURE — 74011250637 HC RX REV CODE- 250/637: Performed by: INTERNAL MEDICINE

## 2018-09-26 PROCEDURE — 77030008467 HC STPLR SKN COVD -B: Performed by: ORTHOPAEDIC SURGERY

## 2018-09-26 PROCEDURE — 77030025487 HC SLV RESRB LOK SCR SYNT -C: Performed by: ORTHOPAEDIC SURGERY

## 2018-09-26 PROCEDURE — 74011250636 HC RX REV CODE- 250/636: Performed by: INTERNAL MEDICINE

## 2018-09-26 PROCEDURE — 77030020782 HC GWN BAIR PAWS FLX 3M -B: Performed by: ANESTHESIOLOGY

## 2018-09-26 PROCEDURE — 90935 HEMODIALYSIS ONE EVALUATION: CPT

## 2018-09-26 PROCEDURE — 77030020143 HC AIRWY LARYN INTUB CGAS -A: Performed by: ANESTHESIOLOGY

## 2018-09-26 PROCEDURE — 80053 COMPREHEN METABOLIC PANEL: CPT

## 2018-09-26 PROCEDURE — 76060000033 HC ANESTHESIA 1 TO 1.5 HR: Performed by: ORTHOPAEDIC SURGERY

## 2018-09-26 PROCEDURE — C1769 GUIDE WIRE: HCPCS | Performed by: ORTHOPAEDIC SURGERY

## 2018-09-26 PROCEDURE — 74011000250 HC RX REV CODE- 250

## 2018-09-26 PROCEDURE — 65270000029 HC RM PRIVATE

## 2018-09-26 DEVICE — NAIL IM L400MM DIA11MM 130DEG LNG L PROX FEM GRN TI CANN: Type: IMPLANTABLE DEVICE | Site: FEMUR | Status: FUNCTIONAL

## 2018-09-26 DEVICE — SCREW BNE L42MM DIA5MM LT BLU TI ST W/ STARDRV T25 RECESS: Type: IMPLANTABLE DEVICE | Site: FEMUR | Status: FUNCTIONAL

## 2018-09-26 DEVICE — IMPLANTABLE DEVICE: Type: IMPLANTABLE DEVICE | Site: FEMUR | Status: FUNCTIONAL

## 2018-09-26 DEVICE — SLEEVE IM RESRB FOR 5MM ANGULAR STBL LOK SCR: Type: IMPLANTABLE DEVICE | Site: FEMUR | Status: FUNCTIONAL

## 2018-09-26 RX ORDER — HYDROMORPHONE HYDROCHLORIDE 2 MG/ML
0.5 INJECTION, SOLUTION INTRAMUSCULAR; INTRAVENOUS; SUBCUTANEOUS
Status: DISCONTINUED | OUTPATIENT
Start: 2018-09-26 | End: 2018-09-26 | Stop reason: HOSPADM

## 2018-09-26 RX ORDER — ONDANSETRON 2 MG/ML
4 INJECTION INTRAMUSCULAR; INTRAVENOUS
Status: DISCONTINUED | OUTPATIENT
Start: 2018-09-26 | End: 2018-09-29 | Stop reason: HOSPADM

## 2018-09-26 RX ORDER — SODIUM CHLORIDE, SODIUM LACTATE, POTASSIUM CHLORIDE, CALCIUM CHLORIDE 600; 310; 30; 20 MG/100ML; MG/100ML; MG/100ML; MG/100ML
100 INJECTION, SOLUTION INTRAVENOUS CONTINUOUS
Status: DISCONTINUED | OUTPATIENT
Start: 2018-09-26 | End: 2018-09-26 | Stop reason: HOSPADM

## 2018-09-26 RX ORDER — MAG HYDROX/ALUMINUM HYD/SIMETH 200-200-20
30 SUSPENSION, ORAL (FINAL DOSE FORM) ORAL
Status: DISCONTINUED | OUTPATIENT
Start: 2018-09-26 | End: 2018-09-29 | Stop reason: HOSPADM

## 2018-09-26 RX ORDER — METOPROLOL TARTRATE 5 MG/5ML
2.5 INJECTION INTRAVENOUS
Status: COMPLETED | OUTPATIENT
Start: 2018-09-26 | End: 2018-09-26

## 2018-09-26 RX ORDER — OXYCODONE HYDROCHLORIDE 5 MG/1
10 TABLET ORAL
Status: DISCONTINUED | OUTPATIENT
Start: 2018-09-26 | End: 2018-09-26 | Stop reason: HOSPADM

## 2018-09-26 RX ORDER — FENTANYL CITRATE 50 UG/ML
INJECTION, SOLUTION INTRAMUSCULAR; INTRAVENOUS AS NEEDED
Status: DISCONTINUED | OUTPATIENT
Start: 2018-09-26 | End: 2018-09-26 | Stop reason: HOSPADM

## 2018-09-26 RX ORDER — LIDOCAINE HYDROCHLORIDE 20 MG/ML
INJECTION, SOLUTION EPIDURAL; INFILTRATION; INTRACAUDAL; PERINEURAL AS NEEDED
Status: DISCONTINUED | OUTPATIENT
Start: 2018-09-26 | End: 2018-09-26 | Stop reason: HOSPADM

## 2018-09-26 RX ORDER — OXYCODONE HYDROCHLORIDE 5 MG/1
5 TABLET ORAL
Status: DISCONTINUED | OUTPATIENT
Start: 2018-09-26 | End: 2018-09-29 | Stop reason: HOSPADM

## 2018-09-26 RX ORDER — HEPARIN SODIUM 5000 [USP'U]/ML
5000 INJECTION, SOLUTION INTRAVENOUS; SUBCUTANEOUS EVERY 8 HOURS
Status: DISCONTINUED | OUTPATIENT
Start: 2018-09-27 | End: 2018-09-29 | Stop reason: HOSPADM

## 2018-09-26 RX ORDER — ONDANSETRON 2 MG/ML
INJECTION INTRAMUSCULAR; INTRAVENOUS AS NEEDED
Status: DISCONTINUED | OUTPATIENT
Start: 2018-09-26 | End: 2018-09-26 | Stop reason: HOSPADM

## 2018-09-26 RX ORDER — SODIUM CHLORIDE 0.9 % (FLUSH) 0.9 %
5-10 SYRINGE (ML) INJECTION AS NEEDED
Status: DISCONTINUED | OUTPATIENT
Start: 2018-09-26 | End: 2018-09-26 | Stop reason: HOSPADM

## 2018-09-26 RX ORDER — FERROUS SULFATE, DRIED 160(50) MG
1 TABLET, EXTENDED RELEASE ORAL
Status: DISCONTINUED | OUTPATIENT
Start: 2018-09-26 | End: 2018-09-29 | Stop reason: HOSPADM

## 2018-09-26 RX ORDER — LIDOCAINE HYDROCHLORIDE 10 MG/ML
0.3 INJECTION INFILTRATION; PERINEURAL ONCE
Status: DISCONTINUED | OUTPATIENT
Start: 2018-09-26 | End: 2018-09-26 | Stop reason: HOSPADM

## 2018-09-26 RX ORDER — SODIUM CHLORIDE 0.9 % (FLUSH) 0.9 %
5-10 SYRINGE (ML) INJECTION EVERY 8 HOURS
Status: DISCONTINUED | OUTPATIENT
Start: 2018-09-26 | End: 2018-09-26 | Stop reason: HOSPADM

## 2018-09-26 RX ORDER — ETOMIDATE 2 MG/ML
INJECTION INTRAVENOUS AS NEEDED
Status: DISCONTINUED | OUTPATIENT
Start: 2018-09-26 | End: 2018-09-26 | Stop reason: HOSPADM

## 2018-09-26 RX ORDER — EPHEDRINE SULFATE 50 MG/ML
INJECTION, SOLUTION INTRAVENOUS AS NEEDED
Status: DISCONTINUED | OUTPATIENT
Start: 2018-09-26 | End: 2018-09-26 | Stop reason: HOSPADM

## 2018-09-26 RX ORDER — MIDAZOLAM HYDROCHLORIDE 1 MG/ML
2 INJECTION, SOLUTION INTRAMUSCULAR; INTRAVENOUS
Status: DISCONTINUED | OUTPATIENT
Start: 2018-09-26 | End: 2018-09-26 | Stop reason: HOSPADM

## 2018-09-26 RX ORDER — SODIUM CHLORIDE 0.9 % (FLUSH) 0.9 %
5-10 SYRINGE (ML) INJECTION AS NEEDED
Status: DISCONTINUED | OUTPATIENT
Start: 2018-09-26 | End: 2018-09-29 | Stop reason: HOSPADM

## 2018-09-26 RX ORDER — SODIUM CHLORIDE 9 MG/ML
25 INJECTION, SOLUTION INTRAVENOUS CONTINUOUS
Status: DISCONTINUED | OUTPATIENT
Start: 2018-09-26 | End: 2018-09-26

## 2018-09-26 RX ORDER — SODIUM CHLORIDE 0.9 % (FLUSH) 0.9 %
5-10 SYRINGE (ML) INJECTION EVERY 8 HOURS
Status: DISCONTINUED | OUTPATIENT
Start: 2018-09-26 | End: 2018-09-29 | Stop reason: HOSPADM

## 2018-09-26 RX ORDER — ACETAMINOPHEN 325 MG/1
650 TABLET ORAL EVERY 8 HOURS
Status: DISCONTINUED | OUTPATIENT
Start: 2018-09-26 | End: 2018-09-29 | Stop reason: HOSPADM

## 2018-09-26 RX ORDER — DOCUSATE SODIUM 100 MG/1
100 CAPSULE, LIQUID FILLED ORAL 2 TIMES DAILY
Status: DISCONTINUED | OUTPATIENT
Start: 2018-09-26 | End: 2018-09-29 | Stop reason: HOSPADM

## 2018-09-26 RX ADMIN — SEVELAMER CARBONATE 1600 MG: 800 TABLET, FILM COATED ORAL at 18:37

## 2018-09-26 RX ADMIN — EPHEDRINE SULFATE 5 MG: 50 INJECTION, SOLUTION INTRAVENOUS at 15:46

## 2018-09-26 RX ADMIN — FENTANYL CITRATE 25 MCG: 50 INJECTION, SOLUTION INTRAMUSCULAR; INTRAVENOUS at 15:36

## 2018-09-26 RX ADMIN — SODIUM CHLORIDE 250 ML: 900 INJECTION, SOLUTION INTRAVENOUS at 11:19

## 2018-09-26 RX ADMIN — ACETAMINOPHEN 650 MG: 325 TABLET ORAL at 21:39

## 2018-09-26 RX ADMIN — PANTOPRAZOLE SODIUM 40 MG: 40 TABLET, DELAYED RELEASE ORAL at 05:15

## 2018-09-26 RX ADMIN — FENTANYL CITRATE 25 MCG: 50 INJECTION, SOLUTION INTRAMUSCULAR; INTRAVENOUS at 15:40

## 2018-09-26 RX ADMIN — FENTANYL CITRATE 25 MCG: 50 INJECTION, SOLUTION INTRAMUSCULAR; INTRAVENOUS at 15:25

## 2018-09-26 RX ADMIN — DILTIAZEM HYDROCHLORIDE 60 MG: 60 TABLET, FILM COATED ORAL at 18:37

## 2018-09-26 RX ADMIN — METOROPROLOL TARTRATE 2.5 MG: 5 INJECTION, SOLUTION INTRAVENOUS at 11:18

## 2018-09-26 RX ADMIN — ACETAMINOPHEN 650 MG: 325 TABLET ORAL at 18:37

## 2018-09-26 RX ADMIN — LIDOCAINE AND PRILOCAINE: 25; 25 CREAM TOPICAL at 05:31

## 2018-09-26 RX ADMIN — METOPROLOL TARTRATE 25 MG: 25 TABLET, FILM COATED ORAL at 10:15

## 2018-09-26 RX ADMIN — Medication 1 AMPULE: at 21:39

## 2018-09-26 RX ADMIN — CALCIUM CARBONATE 500 MG (1,250 MG)-VITAMIN D3 200 UNIT TABLET 1 TABLET: at 18:37

## 2018-09-26 RX ADMIN — Medication 1 AMPULE: at 10:15

## 2018-09-26 RX ADMIN — Medication 10 ML: at 18:00

## 2018-09-26 RX ADMIN — DOCUSATE SODIUM 100 MG: 100 CAPSULE, LIQUID FILLED ORAL at 18:37

## 2018-09-26 RX ADMIN — FENTANYL CITRATE 25 MCG: 50 INJECTION, SOLUTION INTRAMUSCULAR; INTRAVENOUS at 15:29

## 2018-09-26 RX ADMIN — ETOMIDATE 22 MG: 2 INJECTION INTRAVENOUS at 15:16

## 2018-09-26 RX ADMIN — Medication 2 G: at 15:25

## 2018-09-26 RX ADMIN — OXYCODONE HYDROCHLORIDE 5 MG: 5 TABLET ORAL at 05:15

## 2018-09-26 RX ADMIN — ACETAMINOPHEN 650 MG: 325 TABLET ORAL at 05:15

## 2018-09-26 RX ADMIN — DILTIAZEM HYDROCHLORIDE 60 MG: 60 TABLET, FILM COATED ORAL at 05:16

## 2018-09-26 RX ADMIN — SODIUM CHLORIDE 25 ML/HR: 900 INJECTION, SOLUTION INTRAVENOUS at 12:30

## 2018-09-26 RX ADMIN — ONDANSETRON 4 MG: 2 INJECTION INTRAMUSCULAR; INTRAVENOUS at 15:43

## 2018-09-26 RX ADMIN — LIDOCAINE HYDROCHLORIDE 80 MG: 20 INJECTION, SOLUTION EPIDURAL; INFILTRATION; INTRACAUDAL; PERINEURAL at 15:16

## 2018-09-26 RX ADMIN — Medication 5 ML: at 21:39

## 2018-09-26 RX ADMIN — DILTIAZEM HYDROCHLORIDE 60 MG: 60 TABLET, FILM COATED ORAL at 10:15

## 2018-09-26 RX ADMIN — Medication 5 ML: at 05:15

## 2018-09-26 NOTE — PROGRESS NOTES
Pt with a MEWS score of 4. HR A-fib 128 bpm per monitor room. Hospitalist notified. Orders placed for 2.5 mg Lopressor ONCE. Will continue to monitor closely.

## 2018-09-26 NOTE — PERIOP NOTES
Patient and family informed of surgery delay. Addressed questions and concerns. Meal ticket provided to family and DVD player offered to patient. Instructed to call for assistance/needs.

## 2018-09-26 NOTE — PERIOP NOTES
TRANSFER - OUT REPORT: 
 
Verbal report given to Marcella SALCIDO(name) on Mikaela Juarez  being transferred to 72(unit) for routine post - op Report consisted of patients Situation, Background, Assessment and  
Recommendations(SBAR). Information from the following report(s) SBAR, OR Summary and Procedure Summary was reviewed with the receiving nurse. Lines:  
Peripheral IV 09/25/18 Left;Posterior Forearm (Active) Site Assessment Clean, dry, & intact 9/26/2018  5:00 PM  
Phlebitis Assessment 0 9/26/2018  5:00 PM  
Infiltration Assessment 0 9/26/2018  5:00 PM  
Dressing Status Clean, dry, & intact 9/26/2018  5:00 PM  
Dressing Type Tape;Transparent 9/26/2018  5:00 PM  
Hub Color/Line Status Blue; Infusing 9/26/2018  5:00 PM  
Alcohol Cap Used No 9/25/2018  4:33 PM  
  
 
Opportunity for questions and clarification was provided. Patient transported with: 
 O2 @ 2 liters VTE prophylaxis orders have been written for Mikaela Juarez. Patient and family given floor number and nurses name. Family updated re: pt status after security code verified.

## 2018-09-26 NOTE — ANESTHESIA POSTPROCEDURE EVALUATION
Post-Anesthesia Evaluation and Assessment Patient: Ernesto Bobo MRN: [id number]  SSN: [id number] YOB: 1933  Age: 80 y.o. Sex: female Cardiovascular Function/Vital Signs Visit Vitals  /56 (BP 1 Location: Right leg, BP Patient Position: At rest)  Pulse (!) 104  Temp 36.8 °C (98.2 °F)  Resp 16  
 Ht 5' 3\" (1.6 m)  Wt 64.9 kg (143 lb)  SpO2 95%  Breastfeeding No  
 BMI 25.33 kg/m2 Patient is status post general anesthesia for Procedure(s): LEFT FEMUR INSERTION INTRA MEDULLARY NAIL. Nausea/Vomiting: None Postoperative hydration reviewed and adequate. Pain: 
Pain Scale 1: Visual (09/26/18 1700) Pain Intensity 1: 0 (09/26/18 1700) Managed Neurological Status:  
Neuro (WDL): Exceptions to WDL (09/26/18 1700) Neuro Neurologic State: Drowsy (09/26/18 1700) Orientation Level: Oriented to person;Oriented to place;Oriented to situation;Disoriented to time (09/25/18 2025) Cognition: Follows commands (09/25/18 2025) Speech: Clear (09/25/18 2025) LUE Motor Response: Spontaneous  (09/26/18 1700) LLE Motor Response: Spontaneous  (09/26/18 1700) RUE Motor Response: Spontaneous  (09/26/18 1700) RLE Motor Response: Spontaneous  (09/26/18 1700) At baseline Mental Status and Level of Consciousness: Arousable Pulmonary Status:  
O2 Device: Nasal cannula (09/26/18 1700) Adequate oxygenation and airway patent Complications related to anesthesia: None Post-anesthesia assessment completed. No concerns Signed By: Dalila Newell MD   
 September 26, 2018

## 2018-09-26 NOTE — PROGRESS NOTES
Progress Note 2018 Admit Date: 2018  5:11 PM  
NAME: Gurvinder Bradford :  3/14/1933 MRN:  [id number] Attending: Melania Valdez MD 
PCP:  Flora Johnson NP Treatment Team: Attending Provider: Melania Valdez MD; Consulting Provider: Wilian Garcia MD; Consulting Provider: Caitlin Forrest MD; Utilization Review: Hannah Moore; Care Manager: Bienvenido Munoz RN Full Code SUBJECTIVE:  
Ms. Gómez Browne is a 81 yo female with PMH of ESRD on HD (MWF), CAD, HTN, afib, aortic stenosis, CAD who presented with c/o left hip pain after sliding out of her chair and landing on the floor. She was found to have an acute mildly displaced left proximal femoral intertrochanteric fracture. CXR without acute findings. She was found to be hyperkalemic with K 7.2 despite having a full session of dialysis .    K 6.4. Had 6 beat run of Vtach . EKG  with afib, PVCs.  gave D50, insulin, calcium gluconate, kayexalate and underwent HD again for persistent hyperkalemia. Surgery delayed due to hyperkalemia. Had HD again today. Hyperkalemia resolved. Has been tachycardic today. Scheduled for surgery today. Pt denies CP, SOB, n/v/d, abd pain, dizziness. Past Medical History:  
Diagnosis Date  Abdominal pain, chronic, right lower quadrant 2013  Anticoagulated on Coumadin  Aortic stenosis 2015  
 mild to moderate per echo  Arthritis   
 minimal  
 Atrial fibrillation, chronic (HCC) 2016  CAD (coronary artery disease) 2013  
 mild MI   
 Carotid stenosis without Infarct 2016  Chronic anemia 2013  Crohn's disease ([facility]   
 colectomy  Dyslipidemia 2013  Edema 2016  ESRD (end stage renal disease) on dialysis ([facility] 2013  
 right arm functioning- fistula----M-W-F AT [address]  
 Former smoker  GERD (gastroesophageal reflux disease)   
 controlled on meds takes prilosec  Gout 2013  History of ileostomy 5/16/2013  History of peritonitis 2013  
 dialysis associated  History of sepsis 2009  Hypercholesteremia  Hyperlipidemia 1/19/2016  Hypertension   
  well controlled by medication  Hypokalemia 5/16/2013  Hypomagnesemia 6/23/2009  Ileostomy in place [facility])  Intractable nausea and vomiting 5/16/2013  Leukocytosis 5/16/2013  
 NSTEMI (non-ST elevated myocardial infarction) ([facility] [address] 6/26/2013  Palpitations 1/19/2016  Paroxysmal atrial fibrillation ([facility] [facility] 1/19/2016  Paroxysmal tachycardia ([facility] 1/19/2016  S/P AAA repair \" aneurysm in my stomach repaired\"  Serum lipase elevation 5/16/2013  Tricuspid regurgitation EF 55-60%  
 moderate to severe per echo 6/26/15 Recent Results (from the past 24 hour(s)) GLUCOSE, POC Collection Time: 09/25/18  1:26 PM  
Result Value Ref Range Glucose (POC) 119 (H) 65 - 100 mg/dL PLEASE READ & DOCUMENT PPD TEST IN 24 HRS Collection Time: 09/25/18  8:58 PM  
Result Value Ref Range PPD negative Negative  
 mm Induration 0 mm GLUCOSE, POC Collection Time: 09/25/18  9:16 PM  
Result Value Ref Range Glucose (POC) 141 (H) 65 - 100 mg/dL GLUCOSE, POC Collection Time: 09/26/18  1:23 AM  
Result Value Ref Range Glucose (POC) 133 (H) 65 - 100 mg/dL GLUCOSE, POC Collection Time: 09/26/18  4:07 AM  
Result Value Ref Range Glucose (POC) 133 (H) 65 - 100 mg/dL CBC WITH AUTOMATED DIFF Collection Time: 09/26/18  4:47 AM  
Result Value Ref Range WBC 7.4 4.3 - 11.1 K/uL  
 RBC 3.04 (L) 4.05 - 5.2 M/uL  
 HGB 10.2 (L) 11.7 - 15.4 g/dL HCT 32.0 (L) 35.8 - 46.3 % .3 (H) 79.6 - 97.8 FL  
 MCH 33.6 (H) 26.1 - 32.9 PG  
 MCHC 31.9 31.4 - 35.0 g/dL  
 RDW 17.3 % PLATELET 720 340 - 558 K/uL MPV 10.5 9.4 - 12.3 FL ABSOLUTE NRBC 0.00 0.0 - 0.2 K/uL  
 DF AUTOMATED NEUTROPHILS 75 43 - 78 % LYMPHOCYTES 14 13 - 44 %  MONOCYTES 10 4.0 - 12.0 %  
 EOSINOPHILS 0 (L) 0.5 - 7.8 % BASOPHILS 0 0.0 - 2.0 % IMMATURE GRANULOCYTES 0 0.0 - 5.0 %  
 ABS. NEUTROPHILS 5.5 1.7 - 8.2 K/UL  
 ABS. LYMPHOCYTES 1.0 0.5 - 4.6 K/UL  
 ABS. MONOCYTES 0.8 0.1 - 1.3 K/UL  
 ABS. EOSINOPHILS 0.0 0.0 - 0.8 K/UL  
 ABS. BASOPHILS 0.0 0.0 - 0.2 K/UL  
 ABS. IMM. GRANS. 0.0 0.0 - 0.5 K/UL METABOLIC PANEL, COMPREHENSIVE Collection Time: 09/26/18  4:47 AM  
Result Value Ref Range Sodium 136 136 - 145 mmol/L Potassium 5.0 3.5 - 5.1 mmol/L Chloride 98 98 - 107 mmol/L  
 CO2 25 21 - 32 mmol/L Anion gap 13 7 - 16 mmol/L Glucose 127 (H) 65 - 100 mg/dL BUN 24 (H) 8 - 23 MG/DL Creatinine 5.58 (H) 0.6 - 1.0 MG/DL  
 GFR est AA 9 (L) >60 ml/min/1.73m2 GFR est non-AA 8 (L) >60 ml/min/1.73m2 Calcium 8.4 8.3 - 10.4 MG/DL Bilirubin, total 0.6 0.2 - 1.1 MG/DL  
 ALT (SGPT) 22 12 - 65 U/L  
 AST (SGOT) 28 15 - 37 U/L Alk. phosphatase 91 50 - 136 U/L Protein, total 7.0 6.3 - 8.2 g/dL Albumin 3.1 (L) 3.2 - 4.6 g/dL Globulin 3.9 (H) 2.3 - 3.5 g/dL A-G Ratio 0.8 (L) 1.2 - 3.5 GLUCOSE, POC Collection Time: 09/26/18  5:56 AM  
Result Value Ref Range Glucose (POC) 139 (H) 65 - 100 mg/dL Allergies Allergen Reactions  Adhesive Other (comments) Skin tears Current Facility-Administered Medications Medication Dose Route Frequency Provider Last Rate Last Dose  lidocaine (XYLOCAINE) 10 mg/mL (1 %) injection 0.3 mL  0.3 mL SubCUTAneous ONCE Rigoberto Fowler IV, MD      
 lactated Ringers infusion  100 mL/hr IntraVENous CONTINUOUS Deonte Bernal IV, MD      
 sodium chloride (NS) flush 5-10 mL  5-10 mL IntraVENous Q8H Rigoberto Fowler IV, MD      
 sodium chloride (NS) flush 5-10 mL  5-10 mL IntraVENous PRN Deonte Bernal IV, MD      
 midazolam (VERSED) injection 2 mg  2 mg IntraVENous ONCE PRN Deonte Bernal IV, MD      
 sodium chloride 0.9 % bolus infusion 250 mL  250 mL IntraVENous ONCE Jorge Luis Marc  mL/hr at 09/26/18 1119 250 mL at 09/26/18 1119  
 dextrose 5% infusion  30 mL/hr IntraVENous CONTINUOUS Leopoldo Ferreira MD      
 ceFAZolin (ANCEF) 2 g/20 mL in sterile water IV syringe  2 g IntraVENous ON CALL TO OR Yuko Hubbard, RANDALL      
 lactated Ringers infusion  75 mL/hr IntraVENous ON CALL TO OR Yuko Hubbard, RANDALL      
 lidocaine-prilocaine (EMLA) 2.5-2.5 % cream   Topical PRN Ebony Heck MD      
 sodium chloride (NS) flush 5-10 mL  5-10 mL IntraVENous Q8H Christel Iyer MD   5 mL at 09/26/18 0515  sodium chloride (NS) flush 5-10 mL  5-10 mL IntraVENous PRN Nithin Romero MD      
 naloxone (NARCAN) injection 0.4 mg  0.4 mg IntraVENous PRN Christel Iyer MD      
 ondansetron (ZOFRAN) injection 4 mg  4 mg IntraVENous Q4H PRN Christel Iyer MD      
 senna-docusate (PERICOLACE) 8.6-50 mg per tablet 1 Tab  1 Tab Oral DAILY Christel Iyer MD   Stopped at 09/26/18 0900  
 dilTIAZem (CARDIZEM) IR tablet 60 mg  60 mg Oral TIDAC Nithin Romero MD   60 mg at 09/26/18 1015  
 metoprolol tartrate (LOPRESSOR) tablet 25 mg  25 mg Oral BID Christel Iyer MD   25 mg at 09/26/18 1015  pantoprazole (PROTONIX) tablet 40 mg  40 mg Oral ACB Nithin Romero MD   40 mg at 09/26/18 0515  sevelamer carbonate (RENVELA) tab 1,600 mg  1,600 mg Oral TID WITH MEALS Christel Iyer MD   Stopped at 09/26/18 1200  acetaminophen (TYLENOL) tablet 650 mg  650 mg Oral Q8H Jesus Marquez NP   650 mg at 09/26/18 1130  traMADol (ULTRAM) tablet 50 mg  50 mg Oral Q6H PRN Jesus Marquez NP   50 mg at 09/25/18 1509  
 oxyCODONE IR (ROXICODONE) tablet 5 mg  5 mg Oral Q4H PRN Jesus Marquez NP   5 mg at 09/26/18 0515  
 HYDROmorphone (PF) (DILAUDID) injection 0.5 mg  0.5 mg IntraVENous Q4H PRN Yuko Hubbard NP      
 alcohol 62% (NOZIN) nasal  1 Ampule  1 Ampule Topical Q12H Yuko Hubbard NP   1 Ampule at 09/26/18 1015 Review of Systems negative with exception of pertinent positives noted above PHYSICAL EXAM  
 
Visit Vitals  /58  Pulse 97  Temp 97.2 °F (36.2 °C)  Resp 18  Ht 5' 3\" (1.6 m)  Wt 64.9 kg (143 lb)  SpO2 96%  Breastfeeding No  
 BMI 25.33 kg/m2 Temp (24hrs), Av.2 °F (36.8 °C), Min:97.2 °F (36.2 °C), Max:98.7 °F (37.1 °C) Oxygen Therapy O2 Sat (%): 96 % (18 1006) Pulse via Oximetry: 126 beats per minute (18 1940) O2 Device: Room air (18) Intake/Output Summary (Last 24 hours) at 18 1217 Last data filed at 18 2654 Gross per 24 hour Intake                0 ml Output              300 ml Net             -300 ml General:                 No acute distress   
Lungs:                    CTA bilaterally. Resp even and nonlabored Heart:                      S1S2 present with 3/6 murmur LUSB, no rubs gallops. Irregularly irregular, tachycardic. No edema Abdomen:              Soft, non tender, non distended. BS present. Extremities:           LLE in foam boot. No cyanosis, all ext warm/dry. 2+ pedal pulses bilaterally. Neurologic:            A/O X4. No focal deficits. Results summary of Diagnostic Studies/Procedures copied from within [facility] EMR: 
 
 
 
ASSESSMENT Active Hospital Problems Diagnosis Date Noted  Fall 2018  Closed fracture of left hip ([facility] [address] 2018  Fracture, hip ([facility] [address] 2018  Hypertension, benign 2016  CAD (coronary artery disease) 2016 ASCAD - 50% LAD and RCA by cath 2 years ago  Atrial fibrillation, chronic ([facility] [address] 2016  Dyslipidemia 2013  Chronic anemia 2013  ESRD (end stage renal disease) on dialysis ([facility] [address] 2013 Plan: 
Left hip fx:  Ortho consulted. Plans for repair today.  
  
CKD:  Nephrology consulted. MWF dialysis. Had HD today 
  
Afib: Cardiology following. Continue lopressor, cardizem home doses. Give 2.5 lopressor IV X1 dose, repeat if HR remains above 120 after 10 min. Will need Cardiology to see pt prior to surgery if HR remains >120. Echo July 2018 EF 55-60%, DD present.   
  
Hyperkalemia:  resolved. 9/25 had D50, kayexalate, insulin regular 10 units IV, calcium gluconate IV. Nephrology consulted.  
  
HTN:  Chronic. Controlled. Continue home regimen 
  
  
Notes, labs, VS, diagnostic testing reviewed Time spent with pt 20 min 
  
 
DVT Prophylaxis: SCDs Plan of Care Discussed with: Supervising MD Dr. Vanessa Randolph, care team, pt, daughter at bedside Yue Pope NP

## 2018-09-26 NOTE — PERIOP NOTES
TRANSFER - IN REPORT: 
 
Verbal report received from [address] on [place] [place] [address]  being received from room 729 for routine progression of care Report consisted of patients Situation, Background, Assessment and  
Recommendations(SBAR). Information from the following report(s) SBAR, Kardex and MAR was reviewed with the receiving nurse. Opportunity for questions and clarification was provided. Assessment completed upon patients arrival to unit and care assumed.

## 2018-09-26 NOTE — PROGRESS NOTES
Problem: Falls - Risk of 
Goal: *Absence of Falls Document Genevive Camera Fall Risk and appropriate interventions in the flowsheet. Outcome: Progressing Towards Goal 
Fall Risk Interventions: 
  
 
Mentation Interventions: Bed/chair exit alarm, Adequate sleep, hydration, pain control, Door open when patient unattended, Family/sitter at bedside, Reorient patient, Update white board Medication Interventions: Bed/chair exit alarm, Evaluate medications/consider consulting pharmacy, Patient to call before getting OOB Elimination Interventions: Call light in reach, Bed/chair exit alarm, Patient to call for help with toileting needs History of Falls Interventions: Bed/chair exit alarm, Door open when patient unattended, Investigate reason for fall Problem: Pressure Injury - Risk of 
Goal: *Prevention of pressure injury Document Domenico Scale and appropriate interventions in the flowsheet. Outcome: Progressing Towards Goal 
Pressure Injury Interventions: 
Sensory Interventions: Assess changes in LOC, Discuss PT/OT consult with provider, Check visual cues for pain, Pressure redistribution bed/mattress (bed type) Moisture Interventions: Apply protective barrier, creams and emollients, Assess need for specialty bed, Limit adult briefs, Maintain skin hydration (lotion/cream), Minimize layers Activity Interventions: Pressure redistribution bed/mattress(bed type), PT/OT evaluation Mobility Interventions: HOB 30 degrees or less, PT/OT evaluation, Pressure redistribution bed/mattress (bed type) Nutrition Interventions: Document food/fluid/supplement intake, Offer support with meals,snacks and hydration

## 2018-09-26 NOTE — PROGRESS NOTES
TRANSFER - IN REPORT: 
 
Verbal report received from LOLY Walton(name) on Edita Ceja  being received from [employer]) for routine post - op Report consisted of patients Situation, Background, Assessment and  
Recommendations(SBAR). Information from the following report(s) OR Summary, Procedure Summary and MAR was reviewed with the receiving nurse. Opportunity for questions and clarification was provided. Assessment completed upon patients arrival to unit and care assumed.

## 2018-09-26 NOTE — PROGRESS NOTES
Problem: Falls - Risk of 
Goal: *Absence of Falls Document Jude Hereford Fall Risk and appropriate interventions in the flowsheet. Outcome: Progressing Towards Goal 
Fall Risk Interventions: 
Mobility Interventions: Bed/chair exit alarm, Communicate number of staff needed for ambulation/transfer, OT consult for ADLs, Patient to call before getting OOB, PT Consult for mobility concerns Mentation Interventions: Adequate sleep, hydration, pain control, Bed/chair exit alarm, Door open when patient unattended, More frequent rounding, Reorient patient, Update white board Medication Interventions: Evaluate medications/consider consulting pharmacy, Bed/chair exit alarm, Patient to call before getting OOB Elimination Interventions: Bed/chair exit alarm, Call light in reach, Patient to call for help with toileting needs History of Falls Interventions: Bed/chair exit alarm, Door open when patient unattended, Investigate reason for fall Problem: Pressure Injury - Risk of 
Goal: *Prevention of pressure injury Document Domenico Scale and appropriate interventions in the flowsheet. Outcome: Progressing Towards Goal 
Pressure Injury Interventions: 
Sensory Interventions: Assess changes in LOC, Check visual cues for pain, Discuss PT/OT consult with provider, Keep linens dry and wrinkle-free, Pressure redistribution bed/mattress (bed type) Moisture Interventions: Apply protective barrier, creams and emollients, Assess need for specialty bed, Limit adult briefs, Maintain skin hydration (lotion/cream), Minimize layers Activity Interventions: Increase time out of bed, Pressure redistribution bed/mattress(bed type), PT/OT evaluation Mobility Interventions: HOB 30 degrees or less, Pressure redistribution bed/mattress (bed type), PT/OT evaluation Nutrition Interventions: Document food/fluid/supplement intake, Offer support with meals,snacks and hydration

## 2018-09-26 NOTE — DIALYSIS
TRANSFER OUT -DIALYSIS Hemodialysis treatment completed without complications. Patient alert and orientated and VS stable  BP 95/64  P 129   
 
 0 Kgs removed. Needles X2 removed from access and manual pressure held until hemostasis complete and pressure dressing applied. Patient to [phone] after dialysis.

## 2018-09-26 NOTE — DIALYSIS
TRANSFER IN - DIALYSIS Received patient in dialysis unit from [facility] (unit) for ordered procedure. Consent verified for renal replacement therapy. Patient alert and orientated and vital signs stable. BP88/52 P114 Hemodialysis initiated using right AVF upper arm and 15 g needles. Machine settings per MD order. Will monitor during treatment.

## 2018-09-26 NOTE — PROGRESS NOTES
HR remains elevated. IV lopressor ordered. Will administer. Preop RNMichael notified, transport postponed.

## 2018-09-26 NOTE — PROGRESS NOTES
[facility] Progress Note Follow-Up on: ESRD 
 
ROS: 
Gen - no fever, no chills, appetite unchanged CV - no chest pain, no palpitation Lung - no shortness of breath, no cough Abd - no tenderness, no nausea/vomiting, no diarrhea Ext - no edema, + hip pain Exam: 
Vitals:  
 09/26/18 5908 09/26/18 5323 09/26/18 0831 09/26/18 0900 BP: 126/50 (!) 83/61 107/44 (!) 89/58 Pulse: (!) 108 (!) 142 (!) 145 (!) 134 Resp:      
Temp:      
SpO2:      
Weight:      
Height:      
 
 
 
Intake/Output Summary (Last 24 hours) at 09/26/18 1629 Last data filed at 09/25/18 1344 Gross per 24 hour Intake                0 ml Output                0 ml Net                0 ml Wt Readings from Last 3 Encounters:  
09/24/18 64.9 kg (143 lb)  
09/20/18 63 kg (138 lb 12.8 oz) 09/10/18 66.2 kg (146 lb) GEN - in no distress CV - irregular, no murmur, no rub Lung - clear bilaterally Abd - soft, nontender Ext - no edema Recent Labs  
   09/26/18 
 0447  09/24/18 
 2250  09/24/18 
 1637 WBC  7.4   --   10.3 HGB  10.2*   --   12.5 HCT  32.0*   --   37.3 PLT  173   --   178 INR   --   1.1   --   
  
 
Recent Labs  
   09/26/18 
 0447  09/25/18 
 1106  09/25/18 
 0909  09/25/18 
 4453  09/24/18 
 2250   09/24/18 
 1637 NA  136   --    --   132*   --    --   132*  
K  5.0  5.7*  6.9*  6.4*   --    < >  7.2*  
CL  98   --    --   95*   --    --   96* CO2  25   --    --   26   --    --   26 BUN  24*   --    --   30*   --    --   19  
CREA  5.58*   --    --   6.33*   --    --   5.03* CA  8.4   --    --   8.7  8.4   --   8.9 GLU  127*   --    --   117*   --    --   118* MG   --    --    --   1.9   --    --    --   
 < > = values in this interval not displayed. Assessment / Plan: 
Principal Problem: 
  Closed fracture of left hip ([facility] (9/24/2018) Active Problems: 
  ESRD (end stage renal disease) on dialysis ([facility] (5/16/2013) Dyslipidemia (6/26/2013) Chronic anemia (6/26/2013) Atrial fibrillation, chronic ([facility] [address] (1/14/2016) Hypertension, benign (1/19/2016) CAD (coronary artery disease) (1/19/2016) Overview: ASCAD - 50% LAD and RCA by cath 2 years ago Fall (9/24/2018) Fracture, hip ([facility] [address] (9/24/2018) 1. ESRD - HD for clearance and UF 
- Hyperkalemia resolved after HD yesterday 2. Hip fracture - OR today 3. Afib

## 2018-09-26 NOTE — PROGRESS NOTES
ORTH FRACTURE PROGRESS NOTE 2018 Admit Date:  
2018 Post Op day: Day of Surgery Subjective: Rob Mendes PATIENT IN PREOP  
 
PT/OT:  
Gait:    
            
 
Vital Signs:   
Patient Vitals for the past 8 hrs: 
 BP Temp Pulse Resp SpO2  
18 1251 104/51 - (!) 101 16 99 % 18 1240 - - - - 99 % 18 1227 - - (!) 103 - 94 % 18 1210 96/52 98.4 °F (36.9 °C) (!) 103 16 94 % 18 1131 - - 97 - -  
18 1118 100/58 - (!) 121 - -  
18 1006 108/56 97.2 °F (36.2 °C) (!) 133 18 96 %  
18 0934 95/64 - (!) 134 - -  
18 0926 (!) 106/91 - (!) 119 - -  
18 0900 (!) 89/58 - (!) 134 - -  
18 0831 107/44 - (!) 145 - -  
18 0826 (!) 83/61 - (!) 142 - -  
18 0803 126/50 - (!) 108 - -  
18 0757 101/83 - (!) 140 - -  
18 0726 98/46 - (!) 138 - -  
18 0655 100/57 - (!) 116 - -  
18 0636 97/57 - (!) 119 - - Temp (24hrs), Av.3 °F (36.8 °C), Min:97.2 °F (36.2 °C), Max:98.7 °F (37.1 °C) Pain Control:  
Pain Assessment Pain Scale 1: Numeric (0 - 10) Pain Intensity 1: 10 
Pain Onset 1: post fall Pain Location 1: Hip Pain Orientation 1: Left Pain Description 1: Throbbing Pain Intervention(s) 1: Medication (see MAR) Meds: 
 
Current Facility-Administered Medications Medication Dose Route Frequency  lidocaine (XYLOCAINE) 10 mg/mL (1 %) injection 0.3 mL  0.3 mL SubCUTAneous ONCE  
 lactated Ringers infusion  100 mL/hr IntraVENous CONTINUOUS  
 sodium chloride (NS) flush 5-10 mL  5-10 mL IntraVENous Q8H  
 sodium chloride (NS) flush 5-10 mL  5-10 mL IntraVENous PRN  
 midazolam (VERSED) injection 2 mg  2 mg IntraVENous ONCE PRN  
 0.9% sodium chloride infusion  25 mL/hr IntraVENous CONTINUOUS  
 dextrose 5% infusion  30 mL/hr IntraVENous CONTINUOUS  
 ceFAZolin (ANCEF) 2 g/20 mL in sterile water IV syringe  2 g IntraVENous ON CALL TO OR  
  lactated Ringers infusion  75 mL/hr IntraVENous ON CALL TO OR  
 lidocaine-prilocaine (EMLA) 2.5-2.5 % cream   Topical PRN  
 sodium chloride (NS) flush 5-10 mL  5-10 mL IntraVENous Q8H  
 sodium chloride (NS) flush 5-10 mL  5-10 mL IntraVENous PRN  
 naloxone (NARCAN) injection 0.4 mg  0.4 mg IntraVENous PRN  
 ondansetron (ZOFRAN) injection 4 mg  4 mg IntraVENous Q4H PRN  
 senna-docusate (PERICOLACE) 8.6-50 mg per tablet 1 Tab  1 Tab Oral DAILY  dilTIAZem (CARDIZEM) IR tablet 60 mg  60 mg Oral TIDAC  metoprolol tartrate (LOPRESSOR) tablet 25 mg  25 mg Oral BID  pantoprazole (PROTONIX) tablet 40 mg  40 mg Oral ACB  sevelamer carbonate (RENVELA) tab 1,600 mg  1,600 mg Oral TID WITH MEALS  
 acetaminophen (TYLENOL) tablet 650 mg  650 mg Oral Q8H  
 traMADol (ULTRAM) tablet 50 mg  50 mg Oral Q6H PRN  
 oxyCODONE IR (ROXICODONE) tablet 5 mg  5 mg Oral Q4H PRN  
 HYDROmorphone (PF) (DILAUDID) injection 0.5 mg  0.5 mg IntraVENous Q4H PRN  
 alcohol 62% (NOZIN) nasal  1 Ampule  1 Ampule Topical Q12H  
 
 
LAB:   
Recent Labs  
   09/26/18 
 0447  09/24/18 
 2250 HCT  32.0*   --   
HGB  10.2*   --   
INR   --   1.1  
 
 
24 Hour Assessment Issues:   
Oriented Discharge Planning: SNF Transfuse PRBC's:   
 
Assessment & Physician's Comment: 
Neurovascular checks within normal limits Principal Problem: 
  Closed fracture of left hip ([facility] [address] (9/24/2018) Active Problems: 
  ESRD (end stage renal disease) on dialysis ([facility] [address] (5/16/2013) Dyslipidemia (6/26/2013) Chronic anemia (6/26/2013) Atrial fibrillation, chronic ([facility] [address] (1/14/2016) Hypertension, benign (1/19/2016) CAD (coronary artery disease) (1/19/2016) Overview: ASCAD - 50% LAD and RCA by cath 2 years ago Fall (9/24/2018) Fracture, hip ([address] (9/24/2018) Plan:  PLAN OPEN REDUCTION INTERNAL FIXATION OF LEFT HIP Daria Silva NP

## 2018-09-26 NOTE — OP NOTES
[facility] REPORT    Howie Garza  MR#: [id number]  : 1933  ACCOUNT #: [de-identified]   DATE OF SERVICE: 2018    PREOPERATIVE DIAGNOSIS:  Left subtrochanteric femur fracture. POSTOPERATIVE DIAGNOSIS:  Left subtrochanteric femur fracture. PROCEDURE PERFORMED:  ORIF left subtrochanteric femur fracture with IM nail. SURGEON:  Maritza Duvall MD    ASSISTANT:  NONE    ANESTHESIA:  General.    DESCRIPTION OF PROCEDURE:  The patient was brought to the operative suite, placed in the supine position. After adequate anesthesia was achieved in the form of a general, the patient was placed onto the fracture table. Perineal posts and foot rolls were well padded. The patient underwent reduction with longitudinal traction, adduction and internal rotation. AP and lateral fluoroscopic images confirmed the reduction was anatomic along the medial calcar. The left hip and femur was then prepped and draped in the usual sterile fashion. Incision was made over the tip of the trochanter. Hemostasis achieved with Bovie cautery. A guide pin for a Synthes trochanteric fixation nail was inserted through the tip of the trochanter into the canal of the femur. Its position was confirmed by AP and lateral fluoroscopic images. The proximal reamer was then passed by hand over the guidewire to open up the proximal portal.  Ball-tipped guidewire was then inserted through this portal down through the canal to the epiphyseal scar of the femur. Depth gauge was used to determine the appropriate length nail. The 400 mm was chosen. A reamer was then passed by hand to determine the diameter of the nail. It shattered at 6, so we reamed up to 13, and we chose an 11 mm x 400 mm x 130 degree Synthes trochanteric fixation nail. The nail was inserted over the ball-tipped guidewire without difficulty. The ball-tipped guidewire was then removed.   A stab wound was made in the lateral aspect of the proximal femur, and a targeting guide was inserted through the stab wound. The guidewire was inserted through the lateral cortex up into the neck and head. On AP and lateral fluoroscopic images, its position was confirmed as center-center. Depth gauge was used to determine the appropriate length helical blade. The reamers were then passed over the guidewire to open up the lateral cortex and neck. The appropriate 85 mm helical blade was then inserted over the guidewire with excellent purchase. Given the fact it was subtrochanteric, i.e., reverse obliquity type fracture, the decision was made to lock it statically and this was done. The targeting guides were then removed. Two distal interlock screws were placed from lateral to medial direction using a freehand technique with fluoroscopy. Both screws had excellent purchase. AP and lateral fluoroscopic views confirmed the fracture reduced anatomically. Hardware was in good position. Wounds were irrigated with normal saline and closed in layers. A sterile compressive dressing was applied. The patient was then removed from the fracture table and transferred to the recovery room, alert and oriented under the care of Anesthesia. ESTIMATED BLOOD LOSS:  100 mL. FLUIDS:  See Anesthesia record. CLOSURE:  Primary. COMPLICATIONS:  None. SPECIMENS REMOVED:  NONE    IMPLANTS:  SEE BRIEF OP NOTE      MD Suri Neal / Susan Zimmerman  D: 09/26/2018 16:05     T: 09/26/2018 16:34  JOB #: [id number]

## 2018-09-26 NOTE — PROGRESS NOTES
Called NP on recent blood pressure of 99/62 with a pulse of 112. NP stated to give Diltiazem 60 mg, and hold Metoprolol 25 mg until BP rises back within range. Will continue to monitor

## 2018-09-26 NOTE — PROGRESS NOTES
Pt's HR continues to be elevated between 114-124 bpm. BP 95/57. Hospitalist notified. No new orders received. Will continue to monitor closely.

## 2018-09-26 NOTE — PROGRESS NOTES
IV lopressor administered, pulse decrease to 97 A-Fib, Preop RN, Colton Johnson notified, family notified, awaiting transport.

## 2018-09-27 LAB
ALBUMIN SERPL-MCNC: 2.9 G/DL (ref 3.2–4.6)
ALBUMIN/GLOB SERPL: 0.7 {RATIO} (ref 1.2–3.5)
ALP SERPL-CCNC: 83 U/L (ref 50–136)
ALT SERPL-CCNC: 19 U/L (ref 12–65)
ANION GAP SERPL CALC-SCNC: 11 MMOL/L (ref 7–16)
AST SERPL-CCNC: 59 U/L (ref 15–37)
BASOPHILS # BLD: 0 K/UL (ref 0–0.2)
BASOPHILS NFR BLD: 0 % (ref 0–2)
BILIRUB SERPL-MCNC: 0.3 MG/DL (ref 0.2–1.1)
BUN SERPL-MCNC: 28 MG/DL (ref 8–23)
CALCIUM SERPL-MCNC: 8.1 MG/DL (ref 8.3–10.4)
CHLORIDE SERPL-SCNC: 100 MMOL/L (ref 98–107)
CO2 SERPL-SCNC: 27 MMOL/L (ref 21–32)
CREAT SERPL-MCNC: 5.25 MG/DL (ref 0.6–1)
DIFFERENTIAL METHOD BLD: ABNORMAL
EOSINOPHIL # BLD: 0 K/UL (ref 0–0.8)
EOSINOPHIL NFR BLD: 0 % (ref 0.5–7.8)
ERYTHROCYTE [DISTWIDTH] IN BLOOD BY AUTOMATED COUNT: 17.4 %
GLOBULIN SER CALC-MCNC: 4.1 G/DL (ref 2.3–3.5)
GLUCOSE BLD STRIP.AUTO-MCNC: 153 MG/DL (ref 65–100)
GLUCOSE BLD STRIP.AUTO-MCNC: 154 MG/DL (ref 65–100)
GLUCOSE BLD STRIP.AUTO-MCNC: 180 MG/DL (ref 65–100)
GLUCOSE SERPL-MCNC: 139 MG/DL (ref 65–100)
HCT VFR BLD AUTO: 29.3 % (ref 35.8–46.3)
HGB BLD-MCNC: 9.5 G/DL (ref 11.7–15.4)
IMM GRANULOCYTES # BLD: 0 K/UL (ref 0–0.5)
IMM GRANULOCYTES NFR BLD AUTO: 0 % (ref 0–5)
LYMPHOCYTES # BLD: 0.7 K/UL (ref 0.5–4.6)
LYMPHOCYTES NFR BLD: 13 % (ref 13–44)
MCH RBC QN AUTO: 34.2 PG (ref 26.1–32.9)
MCHC RBC AUTO-ENTMCNC: 32.4 G/DL (ref 31.4–35)
MCV RBC AUTO: 105.4 FL (ref 79.6–97.8)
MONOCYTES # BLD: 0.5 K/UL (ref 0.1–1.3)
MONOCYTES NFR BLD: 9 % (ref 4–12)
NEUTS SEG # BLD: 4.3 K/UL (ref 1.7–8.2)
NEUTS SEG NFR BLD: 78 % (ref 43–78)
NRBC # BLD: 0 K/UL (ref 0–0.2)
PLATELET # BLD AUTO: 170 K/UL (ref 150–450)
PMV BLD AUTO: 10.8 FL (ref 9.4–12.3)
POTASSIUM SERPL-SCNC: 4.4 MMOL/L (ref 3.5–5.1)
PROT SERPL-MCNC: 7 G/DL (ref 6.3–8.2)
RBC # BLD AUTO: 2.78 M/UL (ref 4.05–5.2)
SODIUM SERPL-SCNC: 138 MMOL/L (ref 136–145)
WBC # BLD AUTO: 5.5 K/UL (ref 4.3–11.1)

## 2018-09-27 PROCEDURE — 36415 COLL VENOUS BLD VENIPUNCTURE: CPT

## 2018-09-27 PROCEDURE — 65660000000 HC RM CCU STEPDOWN

## 2018-09-27 PROCEDURE — 74011250636 HC RX REV CODE- 250/636: Performed by: NURSE PRACTITIONER

## 2018-09-27 PROCEDURE — 97166 OT EVAL MOD COMPLEX 45 MIN: CPT

## 2018-09-27 PROCEDURE — 74011250637 HC RX REV CODE- 250/637: Performed by: NURSE PRACTITIONER

## 2018-09-27 PROCEDURE — 97535 SELF CARE MNGMENT TRAINING: CPT

## 2018-09-27 PROCEDURE — 80053 COMPREHEN METABOLIC PANEL: CPT

## 2018-09-27 PROCEDURE — 82962 GLUCOSE BLOOD TEST: CPT

## 2018-09-27 PROCEDURE — 74011250637 HC RX REV CODE- 250/637: Performed by: INTERNAL MEDICINE

## 2018-09-27 PROCEDURE — 97530 THERAPEUTIC ACTIVITIES: CPT

## 2018-09-27 PROCEDURE — 97162 PT EVAL MOD COMPLEX 30 MIN: CPT

## 2018-09-27 PROCEDURE — 74011000258 HC RX REV CODE- 258: Performed by: NURSE PRACTITIONER

## 2018-09-27 PROCEDURE — 65270000029 HC RM PRIVATE

## 2018-09-27 PROCEDURE — 97110 THERAPEUTIC EXERCISES: CPT

## 2018-09-27 PROCEDURE — 85025 COMPLETE CBC W/AUTO DIFF WBC: CPT

## 2018-09-27 RX ADMIN — CALCIUM CARBONATE 500 MG (1,250 MG)-VITAMIN D3 200 UNIT TABLET 1 TABLET: at 11:49

## 2018-09-27 RX ADMIN — Medication 1 AMPULE: at 08:24

## 2018-09-27 RX ADMIN — SEVELAMER CARBONATE 1600 MG: 800 TABLET, FILM COATED ORAL at 17:27

## 2018-09-27 RX ADMIN — HEPARIN SODIUM 5000 UNITS: 5000 INJECTION INTRAVENOUS; SUBCUTANEOUS at 11:49

## 2018-09-27 RX ADMIN — DILTIAZEM HYDROCHLORIDE 60 MG: 60 TABLET, FILM COATED ORAL at 17:27

## 2018-09-27 RX ADMIN — Medication 1 AMPULE: at 20:31

## 2018-09-27 RX ADMIN — ACETAMINOPHEN 650 MG: 325 TABLET ORAL at 13:50

## 2018-09-27 RX ADMIN — ACETAMINOPHEN 650 MG: 325 TABLET ORAL at 20:31

## 2018-09-27 RX ADMIN — METOPROLOL TARTRATE 25 MG: 25 TABLET, FILM COATED ORAL at 08:23

## 2018-09-27 RX ADMIN — SEVELAMER CARBONATE 1600 MG: 800 TABLET, FILM COATED ORAL at 08:22

## 2018-09-27 RX ADMIN — ONDANSETRON 4 MG: 2 INJECTION INTRAMUSCULAR; INTRAVENOUS at 17:16

## 2018-09-27 RX ADMIN — PANTOPRAZOLE SODIUM 40 MG: 40 TABLET, DELAYED RELEASE ORAL at 06:09

## 2018-09-27 RX ADMIN — DILTIAZEM HYDROCHLORIDE 60 MG: 60 TABLET, FILM COATED ORAL at 06:09

## 2018-09-27 RX ADMIN — METOPROLOL TARTRATE 25 MG: 25 TABLET, FILM COATED ORAL at 17:27

## 2018-09-27 RX ADMIN — ACETAMINOPHEN 650 MG: 325 TABLET ORAL at 06:09

## 2018-09-27 RX ADMIN — SODIUM CHLORIDE 1000 MG: 900 INJECTION, SOLUTION INTRAVENOUS at 00:46

## 2018-09-27 RX ADMIN — SODIUM CHLORIDE 1000 MG: 900 INJECTION, SOLUTION INTRAVENOUS at 08:22

## 2018-09-27 RX ADMIN — Medication 10 ML: at 20:32

## 2018-09-27 RX ADMIN — Medication 5 ML: at 06:09

## 2018-09-27 RX ADMIN — SEVELAMER CARBONATE 1600 MG: 800 TABLET, FILM COATED ORAL at 11:49

## 2018-09-27 RX ADMIN — TRAMADOL HYDROCHLORIDE 50 MG: 50 TABLET, FILM COATED ORAL at 21:51

## 2018-09-27 RX ADMIN — DOCUSATE SODIUM 100 MG: 100 CAPSULE, LIQUID FILLED ORAL at 17:27

## 2018-09-27 RX ADMIN — TRAMADOL HYDROCHLORIDE 50 MG: 50 TABLET, FILM COATED ORAL at 08:29

## 2018-09-27 RX ADMIN — CALCIUM CARBONATE 500 MG (1,250 MG)-VITAMIN D3 200 UNIT TABLET 1 TABLET: at 17:27

## 2018-09-27 RX ADMIN — CALCIUM CARBONATE 500 MG (1,250 MG)-VITAMIN D3 200 UNIT TABLET 1 TABLET: at 08:23

## 2018-09-27 RX ADMIN — ONDANSETRON 4 MG: 2 INJECTION INTRAMUSCULAR; INTRAVENOUS at 20:31

## 2018-09-27 RX ADMIN — SENNOSIDES AND DOCUSATE SODIUM 1 TABLET: 8.6; 5 TABLET ORAL at 08:22

## 2018-09-27 RX ADMIN — ONDANSETRON 4 MG: 2 INJECTION INTRAMUSCULAR; INTRAVENOUS at 10:58

## 2018-09-27 RX ADMIN — DOCUSATE SODIUM 100 MG: 100 CAPSULE, LIQUID FILLED ORAL at 08:22

## 2018-09-27 RX ADMIN — Medication 5 ML: at 13:51

## 2018-09-27 RX ADMIN — HEPARIN SODIUM 5000 UNITS: 5000 INJECTION INTRAVENOUS; SUBCUTANEOUS at 20:31

## 2018-09-27 RX ADMIN — DILTIAZEM HYDROCHLORIDE 60 MG: 60 TABLET, FILM COATED ORAL at 10:58

## 2018-09-27 NOTE — PROGRESS NOTES
Problem: Mobility Impaired (Adult and Pediatric) Goal: *Acute Goals and Plan of Care (Insert Text) STG: 
(1.)Ms. Marnie Howard will move from supine to sit and sit to supine  and scoot up and down with MINIMAL ASSIST within 3 treatment day(s). (2.)Ms. Marnie Howard will transfer from bed to chair and chair to bed with MINIMAL ASSIST using the least restrictive device within 3 treatment day(s). (3.)Ms. Marnie Howard will ambulate with MODERATE ASSIST for 15 feet with the least restrictive device within 3 treatment day(s). (4.)Ms. Marnie Howard will perform wheelchair mobility 100 feet with Stand by Assist within 3 days to maximize independence with functional mobility. LTG: 
(1.)Ms. Marnie Howard will move from supine to sit and sit to supine  and scoot up and down in bed with CONTACT GUARD ASSIST within 7 treatment day(s). (2.)Ms. Marnie Howard will transfer from bed to chair and chair to bed with CONTACT GUARD ASSIST using the least restrictive device within 7 treatment day(s). (3.)Ms. Marnie Howard will ambulate with MINIMAL ASSIST for 30 feet with the least restrictive device within 7 treatment day(s). (4.)Ms. Marnie Howard will perform wheelchair mobility 100 feet with Mod I within 7 days to maximize independence with functional mobility. 
 
_______________________________________________________________________________________________ PHYSICAL THERAPY: Initial Assessment, Daily Note, Treatment Day: Day of Assessment, AM 9/27/2018 INPATIENT: Hospital Day: 4 [place] / Product Type: Medicare /   
NWB LT Hip NAME/AGE/GENDER: Florentino Mclain is a 80 y.o. female PRIMARY DIAGNOSIS: Fracture, hip ([facility] [address] Fractured Left Hip Closed fracture of left hip (HCC) Closed fracture of left hip ([facility] [address] Procedure(s) (LRB): LEFT FEMUR INSERTION INTRA MEDULLARY NAIL (Left) 1 Day Post-Op ICD-10: Treatment Diagnosis:  
 · Generalized Muscle Weakness (M62.81) · Other lack of cordination (R27.8) · Difficulty in walking, Not elsewhere classified (R26.2) · Other abnormalities of gait and mobility (R26.89) · Repeated Falls (R29.6) · Unspecified Lack of Coordination (R27.9) Precaution/Allergies: 
Adhesive ASSESSMENT:  
 
Ms. Riccardo Swan is 80 y.o. female who presents to physical therapy s/p LT hip IM nailing surgery. She was supine in bed and agreeable to therapy. She is fearful of falling as noted by several times in the interview she expressed verbally that she did not think we were capable of supporting her. She currently lives alone in a one story home with a wheelchair ramp to enter. No rails or steps. At baseline she has been propelling a manual wheelchair and sometimes using a standard walker. She is weak, in pain, and fearful with sensation loss noted along RT S1 Dermatome. All other sensation intact. Ms. Riccardo Swan is very cautious and does not believe she able to move safely, so she requires constant verbal cueing and encouragement to attempt tasks. She attempted bed mobility and stance, but needed mod assistance with them all. Treatment was initiated to teach her strategies to perform bed mobility to decrease pain in LT hip. Cues were given to show her how to transfer with NWB weight bearing status. During stand she was pulling on therapist due to fear, even with cueing to not do so. She was able to stand with walker with 2 person assist for glute cues, and less resistance from pt probably due to feeling safer with two therapist helping. Unable to ambulate with walker due to fear, weakness and pain. She needs to be taught how to maneuver in walker in order to transfer to chair and bathroom. Used remaining treatment time to transfer using stand pivot transfer with another therapist guiding hips to chair. Needing mod manual and verbal cues to forward flex and unweight hips in order to scoot in recliner. Pt was nauseas at beginning of session.  BP and HR were within normal limits. Given bag for vomit and RN notified. Ms. Gloris Lennox is currently functioning below her baseline and would benefit from skilled physical therapy to increase strength, decrease pain, and decrease fear with mobility in order to safely return to prior level of function. Pt was left seated in recline, Call button in reach, and was instructed to stay in chair 5hrs per Dr. Erlin May orders. This section established at most recent assessment PROBLEM LIST (Impairments causing functional limitations): 1. Decreased Strength 2. Decreased ADL/Functional Activities 3. Decreased Transfer Abilities 4. Decreased Ambulation Ability/Technique 5. Decreased Balance 6. Increased Pain 7. Decreased Activity Tolerance 8. Decreased Pacing Skills 9. Increased Fatigue 10. Decreased Flexibility/Joint Mobility 11. Decreased Knowledge of Precautions 12. Decreased [place] with Home Exercise Program 
 INTERVENTIONS PLANNED: (Benefits and precautions of physical therapy have been discussed with the patient.) 1. Balance Exercise 2. Bed Mobility 3. Family Education 4. Gait Training 5. Home Exercise Program (HEP) 6. Manual Therapy 7. Neuromuscular Re-education/Strengthening 8. Range of Motion (ROM) 9. Therapeutic Activites 10. Therapeutic Exercise/Strengthening 11. Transfer Training 12. Group Therapy TREATMENT PLAN: Frequency/Duration: twice daily for duration of hospital stay Rehabilitation Potential For Stated Goals: Good RECOMMENDED REHABILITATION/EQUIPMENT: (at time of discharge pending progress): Due to the probability of continued deficits (see above) this patient will likely need continued skilled physical therapy after discharge. Equipment:  
? None at this time HISTORY:  
History of Present Injury/Illness (Reason for Referral): Priscilla Ordonez is a 80 y.o. female who came to ER due to s/p fall and could not get up.  
  
 Patient is an ESRD patient on HD for the past 8 years. She had HD today without problems. Today she was having lunch and then slid down from her chair and landed on the floor. It is unclear if she hit her head or lost her consciousness for a short time or not. She landed on her left hip and left side of body. She could not get up.  
  
No shortness of breath. No dizziness. No chest pain. No fever. No shaking. No chills.  
  
In ER she is found to have fracture of left hip and is being admitted for further management.  
  
She has history of chronic AF and used to be on Warfarin. She did not want to be on it and refused that. Her cardiologist is aware of this as per her daughter who is the informant at the bedside. Past Medical History/Comorbidities: Ms. Alicia Azul  has a past medical history of Abdominal pain, chronic, right lower quadrant (5/16/2013); Anticoagulated on Coumadin; Aortic stenosis (06/26/2015); Arthritis; Atrial fibrillation, chronic ([facility] [address] (1/14/2016); CAD (coronary artery disease) (06/2013); Carotid stenosis without Infarct (1/19/2016); Chronic anemia (6/26/2013); Crohn's disease ([facility] [address] Dyslipidemia (6/26/2013); Edema (1/19/2016); ESRD (end stage renal disease) on dialysis ([facility] [address] (05/16/2013); Former smoker; GERD (gastroesophageal reflux disease); Gout (6/26/2013); History of ileostomy (5/16/2013); History of peritonitis (2013); History of sepsis (2009); Hypercholesteremia; Hyperlipidemia (1/19/2016); Hypertension; Hypokalemia (5/16/2013); Hypomagnesemia (6/23/2009); Ileostomy in place [facility]); Intractable nausea and vomiting (5/16/2013); Leukocytosis (5/16/2013); NSTEMI (non-ST elevated myocardial infarction) ([facility] [address] (6/26/2013); Palpitations (1/19/2016); Paroxysmal atrial fibrillation ([facility] [address] (1/19/2016); Paroxysmal tachycardia ([facility] [address] (1/19/2016); S/P AAA repair; Serum lipase elevation (5/16/2013); and Tricuspid regurgitation (EF 55-60%).  She also has no past medical history of Adverse effect of anesthesia; Difficult intubation; Malignant hyperthermia due to anesthesia; or Pseudocholinesterase deficiency. Ms. Erika Parra  has a past surgical history that includes hx cataract removal (Bilateral, 2005); hx lap cholecystectomy (2013); pr abdomen surgery proc unlisted; hx colectomy (1983); hx breast biopsy (Bilateral,  ); vascular surgery procedure unlist (Left, 2009); vascular surgery procedure unlist; vascular surgery procedure unlist (Right, 2014); hx aaa repair (2013); and colonoscopy (N/A, 10/20/2016). Social History/Living Environment:  
Home Environment: Private residence # Steps to Enter: 0 Wheelchair Ramp: Yes One/Two Story Residence: One story Living Alone: Yes Support Systems: Child(mt) Patient Expects to be Discharged to[de-identified] Rehabilitation facility Current DME Used/Available at Home: Wheelchair, Vergie Late, [address] chair Prior Level of Function/Work/Activity: 
Walker and Wheelchair at baseline Number of Personal Factors/Comorbidities that affect the Plan of Care: 1-2: MODERATE COMPLEXITY EXAMINATION:  
Most Recent Physical Functioning:  
Gross Assessment: 
AROM: Grossly decreased, non-functional 
PROM: Generally decreased, functional 
Strength: Grossly decreased, non-functional 
Sensation: Impaired Posture: 
Posture (WDL): Exceptions to [facility] Posture Assessment: Forward head, Rounded shoulders, Trunk flexion Balance: 
Sitting: Impaired Sitting - Static: Fair (occasional); Good (unsupported); Prop sitting Sitting - Dynamic: Fair (occasional) Standing: Impaired; With support;Pull to stand Standing - Static: Constant support;Occassional 
Standing - Dynamic : Poor Bed Mobility: 
Rolling: Moderate assistance Supine to Sit: Moderate assistance;Assist x2 Scooting: Moderate assistance Wheelchair Mobility: 
  
Transfers: 
Sit to Stand: Moderate assistance;Assist x2 Stand to Sit: Moderate assistance Bed to Chair: Moderate assistance;Assist x2 Gait: 
Right Side Weight Bearing: Full Left Side Weight Bearing: Non-weight bearing Base of Support: Center of gravity altered;Shift to right Body Structures Involved: 1. Heart 2. Digestive Structures 3. Bones 4. Joints 5. Muscles 6. Ligaments Body Functions Affected: 1. Sensory/Pain 2. Cardio 3. Neuromusculoskeletal 
4. Movement Related Activities and Participation Affected: 1. Learning and Applying Knowledge 2. General Tasks and Demands 3. Mobility 4. Self Care 5. Community, Social and Desha [place] Number of elements that affect the Plan of Care: 4+: HIGH COMPLEXITY CLINICAL PRESENTATION:  
Presentation: Evolving clinical presentation with changing clinical characteristics: MODERATE COMPLEXITY CLINICAL DECISION MAKIN[address] [place] [address] AM-PAC 6 Clicks Basic Mobility Inpatient Short Form How much difficulty does the patient currently have. .. Unable A Lot A Little None 1. Turning over in bed (including adjusting bedclothes, sheets and blankets)? [] 1   [] 2   [x] 3   [] 4  
2. Sitting down on and standing up from a chair with arms ( e.g., wheelchair, bedside commode, etc.)   [] 1   [x] 2   [] 3   [] 4  
3. Moving from lying on back to sitting on the side of the bed? [] 1   [x] 2   [] 3   [] 4 How much help from another person does the patient currently need. .. Total A Lot A Little None 4. Moving to and from a bed to a chair (including a wheelchair)? [] 1   [x] 2   [] 3   [] 4  
5. Need to walk in hospital room? [] 1   [x] 2   [] 3   [] 4  
6. Climbing 3-5 steps with a railing? [x] 1   [] 2   [] 3   [] 4  
© 2007, Trustees of [address] [place], [place] [address], under license to [employer]. All rights reserved Score:  Initial: 12 Most Recent: X (Date: 18 ) Interpretation of Tool:  Represents activities that are increasingly more difficult (i.e. Bed mobility, Transfers, Gait). Score 24 23 22-20 19-15 14-10 9-7 6 Modifier CH CI CJ CK CL CM CN   
 
? Mobility - Walking and Moving Around:  - CURRENT STATUS: CL - 60%-79% impaired, limited or restricted  - GOAL STATUS: CK - 40%-59% impaired, limited or restricted  - D/C STATUS:  ---------------To be determined--------------- [place] / [place] B / Product Type: Medicare /   
 
Medical Necessity:    
· Patient is expected to demonstrate progress in strength, range of motion, balance, coordination and functional technique to decrease assistance required with functional mobility and activity pacing. Reason for Services/Other Comments: 
· Patient continues to require present interventions due to patient's inability to perform bed mobility, transfer or ambulate without high level of dependence. Shahida Copper Use of outcome tool(s) and clinical judgement create a POC that gives a: Questionable prediction of patient's progress: MODERATE COMPLEXITY  
  
 
 
 
TREATMENT:  
(In addition to Assessment/Re-Assessment sessions the following treatments were rendered) Pre-treatment Symptoms/Complaints:  \"You're to small to keep me from falling\" Pain: Initial:  
Pain Intensity 1: 0 Pain Location 1: Hip Pain Orientation 1: Left  Post Session:    
 
Therapeutic Activity: (    35): Therapeutic activities including Bed transfers, Chair transfers, Sequencing tasks, and perform mobility with weight bearing precautions to improve mobility, strength, balance and coordination. Required maximal cues for safety, weight bearing precautions, and facilitation of muscle activation to promote static and dynamic balance in sitting and static balance in standing. Braces/Orthotics/Lines/Etc:  
· IV 
· O2 Device: Nasal cannula · Bilateral SCDs Treatment/Session Assessment:   
· Response to Treatment:  Pt was grateful we worked with her following session. Minimal pain after · Interdisciplinary Collaboration:  
o Physical Therapist 
o Registered Nurse 
o SPT · After treatment position/precautions:  
o Up in chair o Bed alarm/tab alert on 
o Bed/Chair-wheels locked 
o Call light within reach · Compliance with Program/Exercises: Will assess as treatment progresses. · Recommendations/Intent for next treatment session: \"Next visit will focus on advancements to more challenging activities\". Total Treatment Duration: PT Patient Time In/Time Out Time In: 9385 Time Out: 1030 Daniel Renner  
SPT, CSCS

## 2018-09-27 NOTE — PROGRESS NOTES
ORTH FRACTURE PROGRESS NOTE 2018 Admit Date:  
2018 Post Op day: 1 Day Post-Op Subjective: Niki Parkinson PATIENT SITTING UP IN BED; FAMILY AT BESIDE  
 
PT/OT:  
Gait:  Gait Base of Support: Center of gravity altered, Shift to right Vital Signs:   
Patient Vitals for the past 8 hrs: 
 BP Temp Pulse Resp SpO2  
18 1431 119/56 97.9 °F (36.6 °C) 97 20 94 % 18 1050 106/57 98.2 °F (36.8 °C) 94 18 95 % Temp (24hrs), Av °F (36.7 °C), Min:97.3 °F (36.3 °C), Max:99.5 °F (37.5 °C) Pain Control:  
Pain Assessment Pain Scale 1: Numeric (0 - 10) Pain Intensity 1: 0 Pain Onset 1: post fall Pain Location 1: Hip Pain Orientation 1: Left Pain Description 1: Throbbing Pain Intervention(s) 1: Medication (see MAR) Meds: 
 
Current Facility-Administered Medications Medication Dose Route Frequency  sodium chloride (NS) flush 5-10 mL  5-10 mL IntraVENous Q8H  
 sodium chloride (NS) flush 5-10 mL  5-10 mL IntraVENous PRN  
 acetaminophen (TYLENOL) tablet 650 mg  650 mg Oral Q8H  
 oxyCODONE IR (ROXICODONE) tablet 5 mg  5 mg Oral Q4H PRN  
 ondansetron (ZOFRAN) injection 4 mg  4 mg IntraVENous Q4H PRN  
 docusate sodium (COLACE) capsule 100 mg  100 mg Oral BID  alum-mag hydroxide-simeth (MYLANTA) oral suspension 30 mL  30 mL Oral Q4H PRN  
 calcium-vitamin D (OS-ZELDA) 500 mg-200 unit tablet  1 Tab Oral TID WITH MEALS  
 heparin (porcine) injection 5,000 Units  5,000 Units SubCUTAneous Q8H  
 lidocaine-prilocaine (EMLA) 2.5-2.5 % cream   Topical PRN  
 sodium chloride (NS) flush 5-10 mL  5-10 mL IntraVENous Q8H  
 sodium chloride (NS) flush 5-10 mL  5-10 mL IntraVENous PRN  
 naloxone (NARCAN) injection 0.4 mg  0.4 mg IntraVENous PRN  
 ondansetron (ZOFRAN) injection 4 mg  4 mg IntraVENous Q4H PRN  
 senna-docusate (PERICOLACE) 8.6-50 mg per tablet 1 Tab  1 Tab Oral DAILY  dilTIAZem (CARDIZEM) IR tablet 60 mg  60 mg Oral TIDAC  
  metoprolol tartrate (LOPRESSOR) tablet 25 mg  25 mg Oral BID  pantoprazole (PROTONIX) tablet 40 mg  40 mg Oral ACB  sevelamer carbonate (RENVELA) tab 1,600 mg  1,600 mg Oral TID WITH MEALS  traMADol (ULTRAM) tablet 50 mg  50 mg Oral Q6H PRN  
 alcohol 62% (NOZIN) nasal  1 Ampule  1 Ampule Topical Q12H  
 
 
LAB:   
Recent Labs  
   09/27/18 
 0510   09/24/18 
 2250 HCT  29.3*   < >   --   
HGB  9.5*   < >   --   
INR   --    --   1.1  
 < > = values in this interval not displayed. 24 Hour Assessment Issues:   
Oriented Discharge Planning: SNF Transfuse PRBC's:   
 
Assessment & Physician's Comment: 
Dressing is clean, dry, and intact Neurovascular checks within normal limits Principal Problem: 
  Closed fracture of left hip ([facility] [address] (9/24/2018) Active Problems: 
  ESRD (end stage renal disease) on dialysis ([facility] [address] (5/16/2013) Dyslipidemia (6/26/2013) Chronic anemia (6/26/2013) Atrial fibrillation, chronic ([address] (1/14/2016) Hypertension, benign (1/19/2016) CAD (coronary artery disease) (1/19/2016) Overview: ASCAD - 50% LAD and RCA by cath 2 years ago Fall (9/24/2018) Fracture, hip ([facility] [address] (9/24/2018) Plan:  CONTINUE THERAPY 
 NWB 
 DC TO SNF FOR REHAB Paola Nguyen NP

## 2018-09-27 NOTE — PROGRESS NOTES
Hospitalist Progress Note 2018 Admit Date: 2018  5:11 PM  
NAME: Emigdio Husain :  3/14/1933 MRN:  [id number] Attending: Marianne Mills, * PCP:  Leopoldo Morales NP 
 
SUBJECTIVE:  
 
 
Pt is a 81 yo female with PMH of ESRD on HD (MWF), CAD, HTN, afib, aortic stenosis, CAD who presented to ER  with c/o left hip pain after sliding out of her chair and landing on the floor. She was found to have an acute mildly displaced left proximal femoral intertrochanteric fracture. She was found to be hyperkalemic with K 7.2 despite having a full session of dialysis .    K 6.4. Had 6 beat run of Vtach . She had an additional run of HD and hyperkalemia resolved. Surgery delayed due to this but was preformed  by Guy. Pt did well, hemodynamically stable. K WNL. This morning pt is sitting up in the chair. She has nausea and some emesis. She reports pain well controlled. Review of Systems negative with exception of pertinent positives noted above PHYSICAL EXAM  
 
Visit Vitals  /57 (BP 1 Location: Left arm, BP Patient Position: Sitting)  Pulse 94  Temp 98.2 °F (36.8 °C)  Resp 18  Ht 5' 3\" (1.6 m)  Wt 64.9 kg (143 lb)  SpO2 95%  Breastfeeding No  
 BMI 25.33 kg/m2 Temp (24hrs), Av.1 °F (36.7 °C), Min:97.3 °F (36.3 °C), Max:99.5 °F (37.5 °C) Oxygen Therapy O2 Sat (%): 95 % (18 1050) Pulse via Oximetry: 101 beats per minute (18) O2 Device: Nasal cannula (18 5871) O2 Flow Rate (L/min): 1 l/min (18) Intake/Output Summary (Last 24 hours) at 18 1134 Last data filed at 18 1600 Gross per 24 hour Intake              200 ml Output               35 ml Net              165 ml General: No acute distress   
Lungs:  CTA Bilaterally. Heart:  Regular rate and rhythm,  No murmur, rub, or gallop Abdomen: Soft, Non distended, Non tender, Positive bowel sounds Extremities: No cyanosis, clubbing or edema Neurologic:  No focal deficits ASSESSMENT Active Hospital Problems Diagnosis Date Noted  Fall 09/24/2018  Closed fracture of left hip ([facility] 09/24/2018  Fracture, hip ([facility] [facility] 09/24/2018  Hypertension, benign 01/19/2016  CAD (coronary artery disease) 01/19/2016 ASCAD - 50% LAD and RCA by cath 2 years ago  Atrial fibrillation, chronic ([facility] 01/14/2016  Dyslipidemia 06/26/2013  Chronic anemia 06/26/2013  ESRD (end stage renal disease) on dialysis ([facility] 05/16/2013 A/P: 
 
Left hip fx- POD 1 s/p intramedullary nail insertion. Cont PT/OT, pain management, DVT prophylaxis per ortho ESRD- Nephro following for MWF HD. A Fib- Rate controlled. Cards following, cont Cardizem, Lopressor. Remain on remote tele due to NSVTach pre op. DC planning- Heparin Signed By: Carroll Bedoya NP September 27, 2018

## 2018-09-27 NOTE — PROGRESS NOTES
Problem: Falls - Risk of 
Goal: *Absence of Falls Document [facility] Fall Risk and appropriate interventions in the flowsheet. Outcome: Progressing Towards Goal 
Fall Risk Interventions: 
Mobility Interventions: Bed/chair exit alarm, Communicate number of staff needed for ambulation/transfer, OT consult for ADLs, PT Consult for assist device competence, PT Consult for mobility concerns, Patient to call before getting OOB Mentation Interventions: Adequate sleep, hydration, pain control, Bed/chair exit alarm, Door open when patient unattended, More frequent rounding, Reorient patient Medication Interventions: Bed/chair exit alarm, Evaluate medications/consider consulting pharmacy, Patient to call before getting OOB Elimination Interventions: Bed/chair exit alarm, Call light in reach, Patient to call for help with toileting needs History of Falls Interventions: Bed/chair exit alarm, Consult care management for discharge planning, Door open when patient unattended Problem: Pressure Injury - Risk of 
Goal: *Prevention of pressure injury Document Domenico Scale and appropriate interventions in the flowsheet. Outcome: Progressing Towards Goal 
Pressure Injury Interventions: 
Sensory Interventions: Assess changes in LOC, Discuss PT/OT consult with provider, Check visual cues for pain, Keep linens dry and wrinkle-free, Minimize linen layers Moisture Interventions: Apply protective barrier, creams and emollients, Absorbent underpads, Limit adult briefs, Maintain skin hydration (lotion/cream), Minimize layers, Moisture barrier Activity Interventions: Increase time out of bed, Pressure redistribution bed/mattress(bed type), PT/OT evaluation Mobility Interventions: HOB 30 degrees or less, Pressure redistribution bed/mattress (bed type), PT/OT evaluation Nutrition Interventions: Document food/fluid/supplement intake Friction and Shear Interventions: HOB 30 degrees or less, Lift team/patient mobility team, Minimize layers, Sit at 90-degree angle

## 2018-09-27 NOTE — PROGRESS NOTES
[facility] Progress Note Follow-Up on: ESRD Patient seen and examined on rounds reports some weakness and left hip pain, denies any new complaints. ROS: 
Gen - no fever, no chills CV - no chest pain, no palpitation Lung - no shortness of breath, no cough Abd - no tenderness, no nausea/vomiting, no diarrhea Ext - no edema, + hip pain Exam: 
Vitals:  
 09/26/18 1952 09/26/18 2238 09/27/18 3464 09/27/18 8417 BP: 102/58 91/48 97/56 102/56 Pulse: (!) 113 (!) 104 (!) 101 90 Resp: 19 17 18 17 Temp: 97.3 °F (36.3 °C) 97.8 °F (36.6 °C) 99.5 °F (37.5 °C) 97.6 °F (36.4 °C) SpO2: 96% 93% 91% 96% Weight:      
Height:      
 
 
 
Intake/Output Summary (Last 24 hours) at 09/27/18 7998 Last data filed at 09/26/18 1600 Gross per 24 hour Intake              200 ml Output              335 ml Net             -135 ml Wt Readings from Last 3 Encounters:  
09/24/18 64.9 kg (143 lb)  
09/20/18 63 kg (138 lb 12.8 oz) 09/10/18 66.2 kg (146 lb) GEN - in no distress CV - irregular, no murmur, no rub Lung - clear bilaterally, no wheezes Abd - soft, nontender, +BS Ext - no edema Access- RUE AVF + bruit, + thrill Recent Labs  
   09/27/18 
 0510  09/26/18 
 0447  09/24/18 
 2250  09/24/18 
 1637 WBC  5.5  7.4   --   10.3 HGB  9.5*  10.2*   --   12.5 HCT  29.3*  32.0*   --   37.3 PLT  170  173   --   178 INR   --    --   1.1   --   
  
 
Recent Labs  
   09/27/18 
 0510  09/26/18 
 0447  09/25/18 
 1106   09/25/18 
 7263 NA  138  136   --    --   132* K  4.4  5.0  5.7*   < >  6.4*  
CL  100  98   --    --   95* CO2  27  25   --    --   26 BUN  28*  24*   --    --   30* CREA  5.25*  5.58*   --    --   6.33* CA  8.1*  8.4   --    --   8.7 GLU  139*  127*   --    --   117* MG   --    --    --    --   1.9  
 < > = values in this interval not displayed. Assessment / Plan: 
Principal Problem: 
  Closed fracture of left hip ([facility] [address] (9/24/2018) Active Problems: 
  ESRD (end stage renal disease) on dialysis ([facility] [address] (5/16/2013) Dyslipidemia (6/26/2013) Chronic anemia (6/26/2013) Atrial fibrillation, chronic ([facility] [address] (1/14/2016) Hypertension, benign (1/19/2016) CAD (coronary artery disease) (1/19/2016) Overview: ASCAD - 50% LAD and RCA by cath 2 years ago Fall (9/24/2018) Fracture, hip ([facility] [address] (9/24/2018) 1. ESRD - Outpatient unit  [facility] MWF 
-hyperkalemia resolved with HD 
- Next HD tomorrow for clearance and volume 2. Hip fracture - s/p ORIF 9/26 3. Afib Patient seen and examined with Мария Roland NP. Plans as per Aubrey Peter above. She looks much better. Issues with N/V. Gentle UF tomorrow.

## 2018-09-27 NOTE — PROGRESS NOTES
Problem: Self Care Deficits Care Plan (Adult) Goal: *Acute Goals and Plan of Care (Insert Text) 1. Patient will maintain NWB status in LLE for 100% of treatment session and minimal verbal cues from therapist.  
2. Patient will complete functional transfers with minimal assistance while maintaining NWB status in LLE and with adaptive equipment as needed. 3. Patient will complete lower body bathing and dressing with minimal assistance and adaptive equipment as needed. 4. Patient will complete toileting and toilet transfer with minimal assistance. 5. Patient will tolerate 23 minutes of OT treatment with less than 4 rest breaks to increase activity tolerance for ADLs. 6. Patient will participate in at least 15 minutes of BUE therapeutic exercises to strengthen BUE for functional transfers. Timeframe: 7 visits Comments: OCCUPATIONAL THERAPY: Initial Assessment, Daily Note, Treatment Day: Day of Assessment and 1st and AM 9/27/2018 INPATIENT: Hospital Day: 4 [place] / [place] / Product Type: Medicare /  
  
NAME/AGE/GENDER: Niki Parkinson is a 80 y.o. female PRIMARY DIAGNOSIS:  Fracture, hip ([address] Fractured Left Hip Closed fracture of left hip (HCC) Closed fracture of left hip ([address] Procedure(s) (LRB): LEFT FEMUR INSERTION INTRA MEDULLARY NAIL (Left) 1 Day Post-Op ICD-10: Treatment Diagnosis:  
 · Generalized Muscle Weakness (M62.81) Precautions/Allergies: 
  fall risk, NWB LLE Adhesive ASSESSMENT:  
 
Ms. Gordon Hermosillo presents to hospital for above. Pt is known to this therapist from previous admission. Pt lives alone in a one-level home, where she is typically mod I with ADLs/IADLs; no longer driving. Pt uses a w/c and RW at baseline for functional mobility. Today, pt is found seated in chair upon arrival, AOx3, and agreeable to OT evaluation. Per BUE screen, AROM, strength, and coordination are generally decreased (4-/5).  Pt completes scooting in chair with maximal assistance and encouraged to sit in midline while support with chair back. Pt completes grooming tasks (i.e. Washing, drying, combing hair) with minimal assistance for increased shoulder ROM. Treatment limited by pt vomiting throughout. Pt left with possessions in reach and all needs met. Ms. Gloris Lennox presents with functional limitations listed below and appears to be functioning below baseline. They will benefit from continued skilled OT services to maximize safety and independence with ADLs. Will follow during acute stay. This section established at most recent assessment PROBLEM LIST (Impairments causing functional limitations): 1. Decreased Strength 2. Decreased ADL/Functional Activities 3. Decreased Transfer Abilities 4. Decreased Balance 5. Increased Pain 6. Decreased Activity Tolerance INTERVENTIONS PLANNED: (Benefits and precautions of occupational therapy have been discussed with the patient.) 1. Activities of daily living training 2. Adaptive equipment training 3. Balance training 4. Clothing management 5. Donning&doffing training 6. Therapeutic activity 7. Therapeutic exercise TREATMENT PLAN: Frequency/Duration: Follow patient 6x/week to address above goals. Rehabilitation Potential For Stated Goals: Fair RECOMMENDED REHABILITATION/EQUIPMENT: (at time of discharge pending progress): Due to the probability of continued deficits (see above) this patient will likely need continued skilled occupational therapy after discharge. Equipment:  
? None at this time OCCUPATIONAL PROFILE AND HISTORY:  
History of Present Injury/Illness (Reason for Referral): 
See H&P Past Medical History/Comorbidities: Ms. Gloris Lennox  has a past medical history of Abdominal pain, chronic, right lower quadrant (5/16/2013); Anticoagulated on Coumadin; Aortic stenosis (06/26/2015); Arthritis;  [facility], chronic [facility] (1/14/2016); CAD (coronary artery disease) (06/2013); Carotid stenosis without Infarct (1/19/2016); Chronic anemia (6/26/2013); Crohn's [facility] Dyslipidemia (6/26/2013); Edema (1/19/2016); ESRD (end stage renal disease) on dialysis ([facility] (05/16/2013); Former smoker; GERD (gastroesophageal reflux disease); Gout (6/26/2013); History of ileostomy (5/16/2013); History of peritonitis (2013); History of sepsis (2009); Hypercholesteremia; Hyperlipidemia (1/19/2016); Hypertension; Hypokalemia (5/16/2013); Hypomagnesemia (6/23/2009); Ileostomy in place [facility]); Intractable nausea and vomiting (5/16/2013); Leukocytosis (5/16/2013); NSTEMI (non-ST elevated myocardial infarction) ([facility] (6/26/2013); Palpitations (1/19/2016); Paroxysmal atrial fibrillation ([facility] (1/19/2016); Paroxysmal tachycardia ([facility] (1/19/2016); S/P AAA repair; Serum lipase elevation (5/16/2013); and Tricuspid regurgitation (EF 55-60%). She also has no past medical history of Adverse effect of anesthesia; Difficult intubation; Malignant hyperthermia due to anesthesia; or Pseudocholinesterase deficiency. Ms. Raquel Hanna  has a past surgical history that includes hx cataract removal (Bilateral, 2005); hx lap cholecystectomy (2013); pr abdomen surgery proc unlisted; hx colectomy (1983); hx breast biopsy (Bilateral,  ); vascular surgery procedure unlist (Left, 2009); vascular surgery procedure unlist; vascular surgery procedure unlist (Right, 2014); hx aaa repair (2013); and colonoscopy (N/A, 10/20/2016). Social History/Living Environment:  
Home Environment: Private residence # Steps to Enter: 0 Wheelchair Ramp: Yes One/Two Story Residence: One story Living Alone: Yes Support Systems: Child(mt) Patient Expects to be Discharged to[de-identified] Rehabilitation facility Current DME Used/Available at Home: Wheelchair, Ottoniel Palomares, [address] [facility] chair Prior Level of Function/Work/Activity: 
[place] to mod I with ADLs Personal Factors:   
      Sex:  female Age:  80 y.o. Number of Personal Factors/Comorbidities that affect the Plan of Care: Expanded review of therapy/medical records (1-2):  MODERATE COMPLEXITY ASSESSMENT OF OCCUPATIONAL PERFORMANCE[de-identified]  
Activities of Daily Living:  
Basic ADLs (From Assessment) Complex ADLs (From Assessment) Feeding: Setup Oral Facial Hygiene/Grooming: Minimum assistance Bathing: Moderate assistance Upper Body Dressing: Setup Lower Body Dressing: Moderate assistance Toileting: Moderate assistance Grooming/Bathing/Dressing Activities of Daily Living Cognitive Retraining Safety/Judgement: Fall prevention Bed/Mat Mobility Rolling: Moderate assistance Supine to Sit: Moderate assistance;Assist x2 Sit to Stand: Moderate assistance;Assist x2 Bed to Chair: Moderate assistance;Assist x2 Scooting: Maximum assistance (in chair) Most Recent Physical Functioning:  
Gross Assessment: 
AROM: Grossly decreased, non-functional 
Strength: Grossly decreased, non-functional 
         
  
Posture: 
Posture (WDL): Exceptions to [facility] Posture Assessment: Forward head, Rounded shoulders, Trunk flexion Balance: 
Sitting: Impaired Sitting - Static: Fair (occasional) Sitting - Dynamic: Fair (occasional) Standing: Impaired; With support;Pull to stand Standing - Static: Constant support;Occassional 
Standing - Dynamic : Poor Bed Mobility: 
Rolling: Moderate assistance Supine to Sit: Moderate assistance;Assist x2 Scooting: Maximum assistance (in chair) Wheelchair Mobility: 
  
Transfers: 
Sit to Stand: Moderate assistance;Assist x2 Stand to Sit: Moderate assistance Bed to Chair: Moderate assistance;Assist x2 Patient Vitals for the past 6 hrs: 
 BP BP Patient Position SpO2 Pulse  
09/27/18 0714 102/56 At rest 96 % 90  
09/27/18 1050 106/57 Sitting 95 % 94 Mental Status Neurologic State: Alert Orientation Level: Oriented to person, Oriented to place, Oriented to situation Cognition: Follows commands Perception: Appears intact Perseveration: No perseveration noted Safety/Judgement: Fall prevention Physical Skills Involved: 
1. Balance 2. Strength 3. Activity Tolerance 4. Pain (acute) Cognitive Skills Affected (resulting in the inability to perform in a timely and safe manner): 
1. n/a Psychosocial Skills Affected: 1. Habits/Routines 2. Environmental Adaptation 3. Social Roles Number of elements that affect the Plan of Care: 5+:  HIGH COMPLEXITY CLINICAL DECISION MAKING:  
[facility] AM-PAC 6 Clicks Daily Activity Inpatient Short Form How much help from another person does the patient currently need. .. Total A Lot A Little None 1. Putting on and taking off regular lower body clothing? [] 1   [x] 2   [] 3   [] 4  
2. Bathing (including washing, rinsing, drying)? [] 1   [x] 2   [] 3   [] 4  
3. Toileting, which includes using toilet, bedpan or urinal?   [] 1   [x] 2   [] 3   [] 4  
4. Putting on and taking off regular upper body clothing? [] 1   [] 2   [x] 3   [] 4  
5. Taking care of personal grooming such as brushing teeth? [] 1   [] 2   [x] 3   [] 4  
6. Eating meals? [] 1   [] 2   [x] 3   [] 4  
© 2007, Trustees of [facility], under license to [employer]. All rights reserved Score:  Initial: 15 Most Recent: X (Date: -- ) Interpretation of Tool:  Represents activities that are increasingly more difficult (i.e. Bed mobility, Transfers, Gait). Score 24 23 22-20 19-15 14-10 9-7 6 Modifier CH CI CJ CK CL CM CN   
 
? Self Care:  
  - CURRENT STATUS: CK - 40%-59% impaired, limited or restricted  - GOAL STATUS: CJ - 20%-39% impaired, limited or restricted  - D/C STATUS:  ---------------To be determined--------------- [place] / [place] B / Product Type: Medicare /   
 
Medical Necessity:    
· Patient is expected to demonstrate progress in strength, balance and functional technique to increase independence with ADLs. Reason for Services/Other Comments: 
· Patient continues to require skilled intervention due to medical complications and patient unable to attend/participate in therapy as expected. Use of outcome tool(s) and clinical judgement create a POC that gives a: MODERATE COMPLEXITY  
 
 
 
TREATMENT:  
(In addition to Assessment/Re-Assessment sessions the following treatments were rendered) Pre-treatment Symptoms/Complaints:   
Pain: Initial:  
Pain Intensity 1: 0  Post Session:  same Self Care: (8 minutes): Procedure(s) (per grid) utilized to improve and/or restore self-care/home management as related to grooming. Required minimal verbal and manual cueing to facilitate activities of daily living skills. Braces/Orthotics/Lines/Etc:  
· room air Treatment/Session Assessment:   
· Response to Treatment:  Tolerated fair · Interdisciplinary Collaboration:  
o Occupational Therapist 
o Registered Nurse · After treatment position/precautions:  
o Up in chair 
o Bed alarm/tab alert on 
o Bed/Chair-wheels locked 
o Call light within reach 
o RN notified · Compliance with Program/Exercises: compliant all of the time. · Recommendations/Intent for next treatment session: \"Next visit will focus on advancements to more challenging activities and reduction in assistance provided\". Total Treatment Duration: OT Patient Time In/Time Out Time In: 9596 Time Out: 1115 [facility]

## 2018-09-27 NOTE — PROGRESS NOTES
Problem: Mobility Impaired (Adult and Pediatric) Goal: *Acute Goals and Plan of Care (Insert Text) STG: 
(1.)Ms. Monique Sandoval will move from supine to sit and sit to supine  and scoot up and down with MINIMAL ASSIST within 3 treatment day(s). (2.)Ms. Monique Sandoval will transfer from bed to chair and chair to bed with MINIMAL ASSIST using the least restrictive device within 3 treatment day(s). (3.)Ms. Monique Sandoval will ambulate with MODERATE ASSIST for 15 feet with the least restrictive device within 3 treatment day(s). (4.)Ms. Monique Sandoval will perform wheelchair mobility 100 feet with Stand by Assist within 3 days to maximize independence with functional mobility. LTG: 
(1.)Ms. Monique Sandoval will move from supine to sit and sit to supine  and scoot up and down in bed with CONTACT GUARD ASSIST within 7 treatment day(s). (2.)Ms. Monique Sandoval will transfer from bed to chair and chair to bed with CONTACT GUARD ASSIST using the least restrictive device within 7 treatment day(s). (3.)Ms. Monique Sandoval will ambulate with MINIMAL ASSIST for 30 feet with the least restrictive device within 7 treatment day(s). (4.)Ms. Monique Sandoval will perform wheelchair mobility 100 feet with Mod I within 7 days to maximize independence with functional mobility. 
 
_______________________________________________________________________________________________ PHYSICAL THERAPY: Daily Note, Treatment Day: Day of Assessment, PM 9/27/2018 INPATIENT: Hospital Day: 4 [place] B / Product Type: Medicare /   
NWB LT Hip NAME/AGE/GENDER: Emigdio Husain is a 80 y.o. female PRIMARY DIAGNOSIS: Fracture, hip ([facility] [address] Fractured Left Hip Closed fracture of left hip (HCC) Closed fracture of left hip ([facility] [address] Procedure(s) (LRB): LEFT FEMUR INSERTION INTRA MEDULLARY NAIL (Left) 1 Day Post-Op ICD-10: Treatment Diagnosis:  
 · Generalized Muscle Weakness (M62.81) · Other lack of cordination (R27.8) · Difficulty in walking, Not elsewhere classified (R26.2) · Other abnormalities of gait and mobility (R26.89) · Repeated Falls (R29.6) · Unspecified Lack of Coordination (R27.9) Precaution/Allergies: 
Adhesive ASSESSMENT:  
 
Ms. Keyshawn Carr is 80 y.o. female who presents to physical therapy s/p LT hip IM nailing surgery. She is still weak and fearful of movement. She improved in transfers and mobility by needing less assistance from therapist. She needed less cueing to initiated forward flexion of trunk before attempting to stand. Stand only required help from 1 therapist. Still unable to ambulate due to fear and weakness. Constant verbal and manual cueing needed for her to maintain NWB status. Ms. Keyshawn Carr will continue to benefit from skilled therapy to advance goals in order to safely return to prior level of function. Pt was left supine in bed, bilateral SCDs applied, call button in reach, and daughter at bedside. This section established at most recent assessment PROBLEM LIST (Impairments causing functional limitations): 1. Decreased Strength 2. Decreased ADL/Functional Activities 3. Decreased Transfer Abilities 4. Decreased Ambulation Ability/Technique 5. Decreased Balance 6. Increased Pain 7. Decreased Activity Tolerance 8. Decreased Pacing Skills 9. Increased Fatigue 10. Decreased Flexibility/Joint Mobility 11. Decreased Knowledge of Precautions 12. Decreased [place] with Home Exercise Program 
 INTERVENTIONS PLANNED: (Benefits and precautions of physical therapy have been discussed with the patient.) 1. Balance Exercise 2. Bed Mobility 3. Family Education 4. Gait Training 5. Home Exercise Program (HEP) 6. Manual Therapy 7. Neuromuscular Re-education/Strengthening 8. Range of Motion (ROM) 9. Therapeutic Activites 10. Therapeutic Exercise/Strengthening 11. Transfer Training 12. Group Therapy TREATMENT PLAN: Frequency/Duration: twice daily for duration of hospital stay 
Rehabilitation Potential For Stated Goals: Good RECOMMENDED REHABILITATION/EQUIPMENT: (at time of discharge pending progress): Due to the probability of continued deficits (see above) this patient will likely need continued skilled physical therapy after discharge. Equipment:  
? None at this time HISTORY:  
History of Present Injury/Illness (Reason for Referral): Jesika Clifton is a 80 y.o. female who came to ER due to s/p fall and could not get up.  
  
Patient is an ESRD patient on HD for the past 8 years. She had HD today without problems. Today she was having lunch and then slid down from her chair and landed on the floor. It is unclear if she hit her head or lost her consciousness for a short time or not. She landed on her left hip and left side of body. She could not get up.  
  
No shortness of breath. No dizziness. No chest pain. No fever. No shaking. No chills.  
  
In ER she is found to have fracture of left hip and is being admitted for further management.  
  
She has history of chronic AF and used to be on Warfarin. She did not want to be on it and refused that. Her cardiologist is aware of this as per her daughter who is the informant at the bedside. Past Medical History/Comorbidities: Ms. Riccardo Swan  has a past medical history of Abdominal pain, chronic, right lower quadrant (5/16/2013); Anticoagulated on Coumadin; Aortic stenosis (06/26/2015); Arthritis; Atrial fibrillation, chronic ([facility] [address] (1/14/2016); CAD (coronary artery disease) (06/2013); Carotid stenosis without Infarct (1/19/2016); Chronic anemia (6/26/2013); Crohn's disease ([address] Dyslipidemia (6/26/2013); Edema (1/19/2016); ESRD (end stage renal disease) on dialysis ([facility] [address] (05/16/2013); Former smoker; GERD (gastroesophageal reflux disease); Gout (6/26/2013); History of ileostomy (5/16/2013); History of peritonitis (2013);  History of sepsis (2009); Hypercholesteremia; Hyperlipidemia (1/19/2016); Hypertension; Hypokalemia (5/16/2013); Hypomagnesemia (6/23/2009); Ileostomy in place [facility]); Intractable nausea and vomiting (5/16/2013); Leukocytosis (5/16/2013); NSTEMI (non-ST elevated myocardial infarction) ([facility] [address] (6/26/2013); Palpitations (1/19/2016); Paroxysmal atrial fibrillation ([facility] [address] (1/19/2016); Paroxysmal tachycardia ([facility] [address] (1/19/2016); S/P AAA repair; Serum lipase elevation (5/16/2013); and Tricuspid regurgitation (EF 55-60%). She also has no past medical history of Adverse effect of anesthesia; Difficult intubation; Malignant hyperthermia due to anesthesia; or Pseudocholinesterase deficiency. Ms. Hong Vizcaino  has a past surgical history that includes hx cataract removal (Bilateral, 2005); hx lap cholecystectomy (2013); pr abdomen surgery proc unlisted; hx colectomy (1983); hx breast biopsy (Bilateral,  ); vascular surgery procedure unlist (Left, 2009); vascular surgery procedure unlist; vascular surgery procedure unlist (Right, 2014); hx aaa repair (2013); and colonoscopy (N/A, 10/20/2016). Social History/Living Environment:  
Home Environment: Private residence # Steps to Enter: 0 Wheelchair Ramp: Yes One/Two Story Residence: One story Living Alone: Yes Support Systems: Child(mt) Patient Expects to be Discharged to[de-identified] Rehabilitation facility Current DME Used/Available at Home: Wheelchair, Giselle Lua, [address] chair Prior Level of Function/Work/Activity: 
Walker and Wheelchair at baseline Number of Personal Factors/Comorbidities that affect the Plan of Care: 1-2: MODERATE COMPLEXITY EXAMINATION:  
Most Recent Physical Functioning:  
Gross Assessment: 
AROM: Grossly decreased, non-functional 
PROM: Generally decreased, functional 
Strength: Grossly decreased, non-functional 
Sensation: Impaired Posture: 
Posture (WDL): Exceptions to [facility] Posture Assessment: Forward head, Rounded shoulders, Trunk flexion Balance: 
Sitting: Impaired Sitting - Static: Fair (occasional) Sitting - Dynamic: Fair (occasional) Standing: Impaired Standing - Static: Constant support;Occassional;Poor Standing - Dynamic : Poor Bed Mobility: 
Rolling: Moderate assistance Supine to Sit: Moderate assistance;Assist x2 Sit to Supine: Moderate assistance;Assist x2 Scooting: Maximum assistance Level of Assistance: Assist X2 Interventions: Demonstration;Manual cues; Safety awareness training;Verbal cues; Weight shifting training/Pressure relief Wheelchair Mobility: 
  
Transfers: 
Sit to Stand: Moderate assistance (Lack terminal extension) Stand to Sit: Moderate assistance Stand Pivot Transfers: Moderate assistance (assist x2) Bed to Chair: Moderate assistance;Assist x2 Gait: 
Right Side Weight Bearing: Full Left Side Weight Bearing: Non-weight bearing Base of Support: Center of gravity altered;Shift to right Body Structures Involved: 1. Heart 2. Digestive Structures 3. Bones 4. Joints 5. Muscles 6. Ligaments Body Functions Affected: 1. Sensory/Pain 2. Cardio 3. Neuromusculoskeletal 
4. Movement Related Activities and Participation Affected: 1. Learning and Applying Knowledge 2. General Tasks and Demands 3. Mobility 4. Self Care 5. Community, Social and [place] Number of elements that affect the Plan of Care: 4+: HIGH COMPLEXITY CLINICAL PRESENTATION:  
Presentation: Evolving clinical presentation with changing clinical characteristics: MODERATE COMPLEXITY CLINICAL DECISION MAKIN [facility] [address] [address] AM-PAC 6 Clicks Basic Mobility Inpatient Short Form How much difficulty does the patient currently have. .. Unable A Lot A Little None 1. Turning over in bed (including adjusting bedclothes, sheets and blankets)? [] 1   [] 2   [x] 3   [] 4  
2.   Sitting down on and standing up from a chair with arms ( e.g., wheelchair, bedside commode, etc.)   [] 1   [x] 2   [] 3   [] 4  
 3. Moving from lying on back to sitting on the side of the bed? [] 1   [x] 2   [] 3   [] 4 How much help from another person does the patient currently need. .. Total A Lot A Little None 4. Moving to and from a bed to a chair (including a wheelchair)? [] 1   [x] 2   [] 3   [] 4  
5. Need to walk in hospital room? [] 1   [x] 2   [] 3   [] 4  
6. Climbing 3-5 steps with a railing? [x] 1   [] 2   [] 3   [] 4  
© 2007, Trustees of [facility] [employer], under license to [employer]. All rights reserved Score:  Initial: 12 Most Recent: X (Date: 09/27/18 ) Interpretation of Tool:  Represents activities that are increasingly more difficult (i.e. Bed mobility, Transfers, Gait). Score 24 23 22-20 19-15 14-10 9-7 6 Modifier CH CI CJ CK CL CM CN   
 
? Mobility - Walking and Moving Around:  
  - CURRENT STATUS: CL - 60%-79% impaired, limited or restricted  - GOAL STATUS: CK - 40%-59% impaired, limited or restricted  - D/C STATUS:  ---------------To be determined--------------- [place] / [place] B / Product Type: Medicare /   
 
Medical Necessity:    
· Patient is expected to demonstrate progress in strength, range of motion, balance, coordination and functional technique to decrease assistance required with functional mobility and activity pacing. Reason for Services/Other Comments: 
· Patient continues to require present interventions due to patient's inability to perform bed mobility, transfer or ambulate without high level of dependence. Janes Cortes Use of outcome tool(s) and clinical judgement create a POC that gives a: Questionable prediction of patient's progress: MODERATE COMPLEXITY  
  
 
 
 
TREATMENT:  
  
Pre-treatment Symptoms/Complaints:  \"You're still small. Go home and eat a big supper tonight. \" 
Pain: Initial:  
Pain Intensity 1: 0 Pain Location 1: Hip Pain Orientation 1: Left  Post Session: Therapeutic Activity: (    17 minutes): Therapeutic activities including Bed transfers, Chair transfers, Sequencing tasks, and perform mobility with weight bearing precautions to improve mobility, strength, balance and coordination. Required maximal cues for safety, weight bearing precautions, and facilitation of muscle activation to promote static and dynamic balance in sitting and static balance in standing. Therapeutic Exercise: (12 minutes):  Exercises per grid below to improve mobility, strength and coordination. Required moderate visual, verbal and manual cues to promote proper body alignment, promote proper body posture and promote proper body mechanics. First day of exercises listed in chart below to aid in patients ability to progress in transfers and allow for begin ambulation training Date: 
09/27/18 Date: 
 Date: Activity/Exercise Parameters Parameters Parameters Seated Heel Raise 1x10/Foot Seated Toes Raise 1x10/Foot Seated LAQ (AAROM on LT) 1x10/Leg Seated Hip ABD (AAROM on LT) 1x5/Leg Braces/Orthotics/Lines/Etc:  
· IV 
· O2 Device: Nasal cannula · Bilateral SCDs Treatment/Session Assessment:   
· Response to Treatment:  Happy with experiencing little pain, and is eager to work with therapy tomorrow. · Interdisciplinary Collaboration:  
o Physical Therapist 
o Registered Nurse 
o SPT · After treatment position/precautions:  
o Supine in bed 
o Bed alarm/tab alert on 
o Bed/Chair-wheels locked 
o Call light within reach 
o Family at bedside · Compliance with Program/Exercises: Will assess as treatment progresses. · Recommendations/Intent for next treatment session: \"Next visit will focus on advancements to more challenging activities\". Total Treatment Duration: PT Patient Time In/Time Out Time In: 7469 Time Out: 2604 Jayshree Spangler  
SPT, CSCS

## 2018-09-27 NOTE — PROGRESS NOTES
Problem: Interdisciplinary Rounds Goal: Interdisciplinary Rounds Outcome: Progressing Towards Goal 
Interdisciplinary team rounds were held 9/27/2018 with the following team members:Care Management, Nurse Practitioner and  and the patient and daughter. Referrals sent to [facility] and [facility]. Plan of care discussed. See clinical pathway and/or care plan for interventions and desired outcomes.

## 2018-09-27 NOTE — BRIEF OP NOTE
BRIEF OPERATIVE NOTE Date of Procedure: 9/26/2018 Preoperative Diagnosis: Fractured Left Hip Postoperative Diagnosis: Left intertrochanteric femur fracture Procedure(s): LEFT FEMUR INSERTION INTRA MEDULLARY NAIL Surgeon(s) and Role: Maria Fernanda Escobar MD - Primary Surgical Assistant: NONE Surgical Staff: 
Circ-1: Babak Martin RN Scrub Tech-1: Jill Moore Scrub Tech-2: Volodymyr Pizarro Scrub Private/Assistant: Annabella Hill Event Time In Incision Start [phone] Incision Close 1611 Anesthesia: General  
Estimated Blood Loss: 50 CC Specimens: * No specimens in log * Findings: NONE Complications: NONE Implants:  
Implant Name Type Inv. Item Serial No.  Lot No. LRB No. Used Action SLEEVE RESORB 5.0MM LCK SCR -- 2/PK STRL - [id number]  SLEEVE RESORB 5.0MM LCK SCR -- 2/PK STRL  SY[place] [place] [id number] Left 1 Implanted NAIL ARLETH 130D [id number] LT -- TFNA STRL - [id number]  NAIL ARLETH 130D [id number] LT -- Bruce Ing [place] [id number] Left 1 Implanted BLADE HELCL FEN 85MM STRL -- TFNA - [id number]  BLADE HELCL FEN 85MM STRL -- TFNA  SYNTHES [place] [id number] Left 1 Implanted SCR NL LCK ANGL STBL T25 5X44 -- TI STRL - [id number]  SCR NL LCK ANGL STBL T25 5X44 -- Lurlean Rife [place] [id number] Left 1 Implanted SCR NL LCK ANGL STBL T25 5X42 -- TI STRL - [id number]   SCR NL LCK ANGL STBL T25 5X42 -- Lurlean Rife [place] [id number] Left 1 Implanted

## 2018-09-28 LAB
ALBUMIN SERPL-MCNC: 2.9 G/DL (ref 3.2–4.6)
ALBUMIN/GLOB SERPL: 0.7 {RATIO} (ref 1.2–3.5)
ALP SERPL-CCNC: 80 U/L (ref 50–136)
ALT SERPL-CCNC: 7 U/L (ref 12–65)
ANION GAP SERPL CALC-SCNC: 12 MMOL/L (ref 7–16)
AST SERPL-CCNC: 39 U/L (ref 15–37)
BASOPHILS # BLD: 0 K/UL (ref 0–0.2)
BASOPHILS NFR BLD: 0 % (ref 0–2)
BILIRUB SERPL-MCNC: 0.4 MG/DL (ref 0.2–1.1)
BUN SERPL-MCNC: 46 MG/DL (ref 8–23)
CALCIUM SERPL-MCNC: 8.4 MG/DL (ref 8.3–10.4)
CHLORIDE SERPL-SCNC: 92 MMOL/L (ref 98–107)
CO2 SERPL-SCNC: 29 MMOL/L (ref 21–32)
CREAT SERPL-MCNC: 7.49 MG/DL (ref 0.6–1)
DIFFERENTIAL METHOD BLD: ABNORMAL
EOSINOPHIL # BLD: 0 K/UL (ref 0–0.8)
EOSINOPHIL NFR BLD: 1 % (ref 0.5–7.8)
ERYTHROCYTE [DISTWIDTH] IN BLOOD BY AUTOMATED COUNT: 17.7 %
GLOBULIN SER CALC-MCNC: 4.2 G/DL (ref 2.3–3.5)
GLUCOSE BLD STRIP.AUTO-MCNC: 113 MG/DL (ref 65–100)
GLUCOSE BLD STRIP.AUTO-MCNC: 141 MG/DL (ref 65–100)
GLUCOSE SERPL-MCNC: 128 MG/DL (ref 65–100)
HCT VFR BLD AUTO: 28.1 % (ref 35.8–46.3)
HGB BLD-MCNC: 9.2 G/DL (ref 11.7–15.4)
IMM GRANULOCYTES # BLD: 0 K/UL (ref 0–0.5)
IMM GRANULOCYTES NFR BLD AUTO: 1 % (ref 0–5)
LYMPHOCYTES # BLD: 1.3 K/UL (ref 0.5–4.6)
LYMPHOCYTES NFR BLD: 22 % (ref 13–44)
MCH RBC QN AUTO: 33.8 PG (ref 26.1–32.9)
MCHC RBC AUTO-ENTMCNC: 32.7 G/DL (ref 31.4–35)
MCV RBC AUTO: 103.3 FL (ref 79.6–97.8)
MONOCYTES # BLD: 0.7 K/UL (ref 0.1–1.3)
MONOCYTES NFR BLD: 13 % (ref 4–12)
NEUTS SEG # BLD: 3.8 K/UL (ref 1.7–8.2)
NEUTS SEG NFR BLD: 64 % (ref 43–78)
NRBC # BLD: 0 K/UL (ref 0–0.2)
PLATELET # BLD AUTO: 215 K/UL (ref 150–450)
PMV BLD AUTO: 10.9 FL (ref 9.4–12.3)
POTASSIUM SERPL-SCNC: 4.3 MMOL/L (ref 3.5–5.1)
PROT SERPL-MCNC: 7.1 G/DL (ref 6.3–8.2)
RBC # BLD AUTO: 2.72 M/UL (ref 4.05–5.2)
SODIUM SERPL-SCNC: 133 MMOL/L (ref 136–145)
WBC # BLD AUTO: 5.9 K/UL (ref 4.3–11.1)

## 2018-09-28 PROCEDURE — 74011000250 HC RX REV CODE- 250: Performed by: INTERNAL MEDICINE

## 2018-09-28 PROCEDURE — 74011250637 HC RX REV CODE- 250/637: Performed by: NURSE PRACTITIONER

## 2018-09-28 PROCEDURE — 94760 N-INVAS EAR/PLS OXIMETRY 1: CPT

## 2018-09-28 PROCEDURE — 80053 COMPREHEN METABOLIC PANEL: CPT

## 2018-09-28 PROCEDURE — 74011250636 HC RX REV CODE- 250/636: Performed by: INTERNAL MEDICINE

## 2018-09-28 PROCEDURE — 65660000000 HC RM CCU STEPDOWN

## 2018-09-28 PROCEDURE — 77010033678 HC OXYGEN DAILY

## 2018-09-28 PROCEDURE — 97110 THERAPEUTIC EXERCISES: CPT

## 2018-09-28 PROCEDURE — 74011250636 HC RX REV CODE- 250/636: Performed by: NURSE PRACTITIONER

## 2018-09-28 PROCEDURE — 82962 GLUCOSE BLOOD TEST: CPT

## 2018-09-28 PROCEDURE — 74011250637 HC RX REV CODE- 250/637: Performed by: INTERNAL MEDICINE

## 2018-09-28 PROCEDURE — C9113 INJ PANTOPRAZOLE SODIUM, VIA: HCPCS | Performed by: INTERNAL MEDICINE

## 2018-09-28 PROCEDURE — 36415 COLL VENOUS BLD VENIPUNCTURE: CPT

## 2018-09-28 PROCEDURE — 90935 HEMODIALYSIS ONE EVALUATION: CPT

## 2018-09-28 PROCEDURE — 65270000029 HC RM PRIVATE

## 2018-09-28 PROCEDURE — 97530 THERAPEUTIC ACTIVITIES: CPT

## 2018-09-28 PROCEDURE — 74640000003 HC CRRT SET UP OR EXCHANGE

## 2018-09-28 PROCEDURE — 85025 COMPLETE CBC W/AUTO DIFF WBC: CPT

## 2018-09-28 RX ORDER — ONDANSETRON 2 MG/ML
4 INJECTION INTRAMUSCULAR; INTRAVENOUS
Status: DISCONTINUED | OUTPATIENT
Start: 2018-09-28 | End: 2018-09-29 | Stop reason: HOSPADM

## 2018-09-28 RX ADMIN — DILTIAZEM HYDROCHLORIDE 60 MG: 60 TABLET, FILM COATED ORAL at 17:29

## 2018-09-28 RX ADMIN — ONDANSETRON 4 MG: 2 INJECTION INTRAMUSCULAR; INTRAVENOUS at 05:13

## 2018-09-28 RX ADMIN — Medication 10 ML: at 05:10

## 2018-09-28 RX ADMIN — Medication 1 AMPULE: at 12:45

## 2018-09-28 RX ADMIN — ONDANSETRON 4 MG: 2 INJECTION INTRAMUSCULAR; INTRAVENOUS at 12:46

## 2018-09-28 RX ADMIN — HEPARIN SODIUM 5000 UNITS: 5000 INJECTION INTRAVENOUS; SUBCUTANEOUS at 12:50

## 2018-09-28 RX ADMIN — Medication 10 ML: at 14:00

## 2018-09-28 RX ADMIN — ACETAMINOPHEN 650 MG: 325 TABLET ORAL at 14:00

## 2018-09-28 RX ADMIN — SODIUM CHLORIDE 40 MG: 9 INJECTION INTRAMUSCULAR; INTRAVENOUS; SUBCUTANEOUS at 12:00

## 2018-09-28 RX ADMIN — ONDANSETRON 4 MG: 2 INJECTION INTRAMUSCULAR; INTRAVENOUS at 17:29

## 2018-09-28 RX ADMIN — SEVELAMER CARBONATE 1600 MG: 800 TABLET, FILM COATED ORAL at 12:48

## 2018-09-28 RX ADMIN — TRAMADOL HYDROCHLORIDE 50 MG: 50 TABLET, FILM COATED ORAL at 05:09

## 2018-09-28 RX ADMIN — DOCUSATE SODIUM 100 MG: 100 CAPSULE, LIQUID FILLED ORAL at 12:50

## 2018-09-28 RX ADMIN — METOPROLOL TARTRATE 25 MG: 25 TABLET, FILM COATED ORAL at 12:48

## 2018-09-28 RX ADMIN — DOCUSATE SODIUM 100 MG: 100 CAPSULE, LIQUID FILLED ORAL at 17:28

## 2018-09-28 RX ADMIN — ACETAMINOPHEN 650 MG: 325 TABLET ORAL at 05:09

## 2018-09-28 RX ADMIN — Medication 1 AMPULE: at 21:14

## 2018-09-28 RX ADMIN — METOPROLOL TARTRATE 25 MG: 25 TABLET, FILM COATED ORAL at 17:28

## 2018-09-28 RX ADMIN — DILTIAZEM HYDROCHLORIDE 60 MG: 60 TABLET, FILM COATED ORAL at 12:49

## 2018-09-28 RX ADMIN — CALCIUM CARBONATE 500 MG (1,250 MG)-VITAMIN D3 200 UNIT TABLET 1 TABLET: at 12:49

## 2018-09-28 RX ADMIN — CALCIUM CARBONATE 500 MG (1,250 MG)-VITAMIN D3 200 UNIT TABLET 1 TABLET: at 17:28

## 2018-09-28 RX ADMIN — Medication 5 ML: at 21:20

## 2018-09-28 RX ADMIN — DILTIAZEM HYDROCHLORIDE 60 MG: 60 TABLET, FILM COATED ORAL at 08:39

## 2018-09-28 RX ADMIN — PANTOPRAZOLE SODIUM 40 MG: 40 TABLET, DELAYED RELEASE ORAL at 05:10

## 2018-09-28 RX ADMIN — HEPARIN SODIUM 5000 UNITS: 5000 INJECTION INTRAVENOUS; SUBCUTANEOUS at 05:10

## 2018-09-28 RX ADMIN — HEPARIN SODIUM 5000 UNITS: 5000 INJECTION INTRAVENOUS; SUBCUTANEOUS at 21:14

## 2018-09-28 RX ADMIN — SEVELAMER CARBONATE 1600 MG: 800 TABLET, FILM COATED ORAL at 17:27

## 2018-09-28 RX ADMIN — ACETAMINOPHEN 650 MG: 325 TABLET ORAL at 21:14

## 2018-09-28 RX ADMIN — SENNOSIDES AND DOCUSATE SODIUM 1 TABLET: 8.6; 5 TABLET ORAL at 12:48

## 2018-09-28 RX ADMIN — SODIUM CHLORIDE 250 ML: 900 INJECTION, SOLUTION INTRAVENOUS at 12:00

## 2018-09-28 RX ADMIN — ONDANSETRON 4 MG: 2 INJECTION INTRAMUSCULAR; INTRAVENOUS at 21:14

## 2018-09-28 NOTE — DIALYSIS
TRANSFER IN - DIALYSIS Received patient in dialysis unit from 729 (unit) for ordered procedure. Consent verified for renal replacement therapy. Patient alert and orientated and vital signs stable. BP99/44 P79 Hemodialysis initiated using right upper arm AVF and 15 g needles. Machine settings per MD order. Will monitor during treatment.

## 2018-09-28 NOTE — PROGRESS NOTES
Problem: Falls - Risk of 
Goal: *Absence of Falls Document Valeriy Murphy Fall Risk and appropriate interventions in the flowsheet. Outcome: Progressing Towards Goal 
Fall Risk Interventions: 
Mobility Interventions: Bed/chair exit alarm, Communicate number of staff needed for ambulation/transfer, OT consult for ADLs, Patient to call before getting OOB, PT Consult for assist device competence, PT Consult for mobility concerns Mentation Interventions: Adequate sleep, hydration, pain control, Bed/chair exit alarm, Door open when patient unattended, More frequent rounding, Reorient patient Medication Interventions: Bed/chair exit alarm, Evaluate medications/consider consulting pharmacy, Patient to call before getting OOB Elimination Interventions: Bed/chair exit alarm, Call light in reach, Patient to call for help with toileting needs History of Falls Interventions: Bed/chair exit alarm, Consult care management for discharge planning, Door open when patient unattended Problem: Pressure Injury - Risk of 
Goal: *Prevention of pressure injury Document Domenico Scale and appropriate interventions in the flowsheet. Outcome: Progressing Towards Goal 
Pressure Injury Interventions: 
Sensory Interventions: Assess changes in LOC, Check visual cues for pain, Keep linens dry and wrinkle-free, Minimize linen layers, Turn and reposition approx. every two hours (pillows and wedges if needed) Moisture Interventions: Absorbent underpads, Maintain skin hydration (lotion/cream) Activity Interventions: Increase time out of bed, Pressure redistribution bed/mattress(bed type), PT/OT evaluation Mobility Interventions: HOB 30 degrees or less, Pressure redistribution bed/mattress (bed type), PT/OT evaluation Nutrition Interventions: Document food/fluid/supplement intake Friction and Shear Interventions: HOB 30 degrees or less, Lift team/patient mobility team, Minimize layers, Sit at 90-degree angle

## 2018-09-28 NOTE — PROGRESS NOTES
Problem: Mobility Impaired (Adult and Pediatric) Goal: *Acute Goals and Plan of Care (Insert Text) STG: 
(1.)Ms. Evans Boyd will move from supine to sit and sit to supine  and scoot up and down with MINIMAL ASSIST within 3 treatment day(s). (2.)Ms. Eavns Boyd will transfer from bed to chair and chair to bed with MINIMAL ASSIST using the least restrictive device within 3 treatment day(s). (3.)Ms. Evans Boyd will ambulate with MODERATE ASSIST for 15 feet with the least restrictive device within 3 treatment day(s). (4.)Ms. Evans Boyd will perform wheelchair mobility 100 feet with Stand by Assist within 3 days to maximize independence with functional mobility. LTG: 
(1.)Ms. Evans Boyd will move from supine to sit and sit to supine  and scoot up and down in bed with CONTACT GUARD ASSIST within 7 treatment day(s). (2.)Ms. Evans Boyd will transfer from bed to chair and chair to bed with CONTACT GUARD ASSIST using the least restrictive device within 7 treatment day(s). (3.)Ms. Evans Boyd will ambulate with MINIMAL ASSIST for 30 feet with the least restrictive device within 7 treatment day(s). (4.)Ms. Evans Boyd will perform wheelchair mobility 100 feet with Mod I within 7 days to maximize independence with functional mobility. 
 
_______________________________________________________________________________________________ PHYSICAL THERAPY: Daily Note, Treatment Day: 1st, PM 9/28/2018 INPATIENT: Hospital Day: 5 [place] / Product Type: Medicare /   
NWB LT Hip NAME/AGE/GENDER: Rashmi Martinez is a 80 y.o. female PRIMARY DIAGNOSIS: Fracture, hip ([facility] [address] Fractured Left Hip Closed fracture of left hip (HCC) Closed fracture of left hip ([facility] [address] Procedure(s) (LRB): LEFT FEMUR INSERTION INTRA MEDULLARY NAIL (Left) 2 Days Post-Op ICD-10: Treatment Diagnosis:  
 · Generalized Muscle Weakness (M62.81) · Other lack of cordination (R27.8) · Difficulty in walking, Not elsewhere classified (R26.2) · Other abnormalities of gait and mobility (R26.89) · Repeated Falls (R29.6) · Unspecified Lack of Coordination (R27.9) Precaution/Allergies: 
Adhesive ASSESSMENT:  
 
Ms. Alicia Azul is 80 y.o. female who presents to physical therapy s/p LT hip IM nailing surgery. Pt was seen shortly after she returned from her HD treatment. Showing improvement by using more forward flexion during transfers, but it still not significant enough for her off weight hips. Needs encouragement and increased time to perform bed mobility and transfers. Still Mod A x 2 for bed mobility. This will improve as she is able to sequence mobility more efficiently and improves trunk and BLE strength. Still unable to ambulate and shows decreased ability to maintain NWB precautions. She needs to continue multiple reps with transfer techniques in order to feel more secure with movements so she can safely transition into mobility training. Pt was left seated in chair, chair alarm active, daughter at bedside, and call button in reach. PM Note: patient sitting in recliner, declines participating in BLE exercises due to fatigue. Attempted sliding board transfer, however patient unable to maintain forward trunk flexion during sitting, so required total assist x2 to perform and to maintain LLE NWB. Patient attempted to clasp hands around therapist's neck, and educated on hand placement during transfer for patient and therapist safety. Patient very angry and agitated with therapist for this education. Suggest that a second staff member be present for all treatments due to patient requiring additional assistance and agitation. Maximal assist required for sit to supine transfer. No progress towards goals noted this session. Will continue therapy efforts. This section established at most recent assessment PROBLEM LIST (Impairments causing functional limitations): 1. Decreased Strength 2. Decreased ADL/Functional Activities 3. Decreased Transfer Abilities 4. Decreased Ambulation Ability/Technique 5. Decreased Balance 6. Increased Pain 7. Decreased Activity Tolerance 8. Decreased Pacing Skills 9. Increased Fatigue 10. Decreased Flexibility/Joint Mobility 11. Decreased Knowledge of Precautions 12. Decreased [place] with Home Exercise Program 
 INTERVENTIONS PLANNED: (Benefits and precautions of physical therapy have been discussed with the patient.) 1. Balance Exercise 2. Bed Mobility 3. Family Education 4. Gait Training 5. Home Exercise Program (HEP) 6. Manual Therapy 7. Neuromuscular Re-education/Strengthening 8. Range of Motion (ROM) 9. Therapeutic Activites 10. Therapeutic Exercise/Strengthening 11. Transfer Training 12. Group Therapy TREATMENT PLAN: Frequency/Duration: twice daily for duration of hospital stay Rehabilitation Potential For Stated Goals: Good RECOMMENDED REHABILITATION/EQUIPMENT: (at time of discharge pending progress): Due to the probability of continued deficits (see above) this patient will likely need continued skilled physical therapy after discharge. Equipment:  
? None at this time HISTORY:  
History of Present Injury/Illness (Reason for Referral): Zaira Smith is a 80 y.o. female who came to ER due to s/p fall and could not get up.  
  
Patient is an ESRD patient on HD for the past 8 years. She had HD today without problems. Today she was having lunch and then slid down from her chair and landed on the floor. It is unclear if she hit her head or lost her consciousness for a short time or not. She landed on her left hip and left side of body. She could not get up.  
  
No shortness of breath. No dizziness. No chest pain. No fever. No shaking.  No chills.  
  
In ER she is found to have fracture of left hip and is being admitted for further management.  
  
 She has history of chronic AF and used to be on Warfarin. She did not want to be on it and refused that. Her cardiologist is aware of this as per her daughter who is the informant at the bedside. Past Medical History/Comorbidities: Ms. Nikki Sheehan  has a past medical history of Abdominal pain, chronic, right lower quadrant (5/16/2013); Anticoagulated on Coumadin; Aortic stenosis (06/26/2015); Arthritis; Atrial fibrillation, chronic ([address] (1/14/2016); CAD (coronary artery disease) (06/2013); Carotid stenosis without Infarct (1/19/2016); Chronic anemia (6/26/2013); Crohn's disease ([address] Dyslipidemia (6/26/2013); Edema (1/19/2016); ESRD (end stage renal disease) on dialysis ([address] (05/16/2013); Former smoker; GERD (gastroesophageal reflux disease); Gout (6/26/2013); History of ileostomy (5/16/2013); History of peritonitis (2013); History of sepsis (2009); Hypercholesteremia; Hyperlipidemia (1/19/2016); Hypertension; Hypokalemia (5/16/2013); Hypomagnesemia (6/23/2009); Ileostomy in place [facility]); Intractable nausea and vomiting (5/16/2013); Leukocytosis (5/16/2013); NSTEMI (non-ST elevated myocardial infarction) ([address] (6/26/2013); Palpitations (1/19/2016); Paroxysmal atrial fibrillation ([address] (1/19/2016); Paroxysmal tachycardia ([address] (1/19/2016); S/P AAA repair; Serum lipase elevation (5/16/2013); and Tricuspid regurgitation (EF 55-60%). She also has no past medical history of Adverse effect of anesthesia; Difficult intubation; Malignant hyperthermia due to anesthesia; or Pseudocholinesterase deficiency. Ms. Nikki Sheehan  has a past surgical history that includes hx cataract removal (Bilateral, 2005); hx lap cholecystectomy (2013); pr abdomen surgery proc unlisted; hx colectomy (1983); hx breast biopsy (Bilateral,  ); vascular surgery procedure unlist (Left, 2009); vascular surgery procedure unlist; vascular surgery procedure unlist (Right, 2014); hx aaa repair (2013); and colonoscopy (N/A, 10/20/2016). Social History/Living Environment:  
Home Environment: Private residence # Steps to Enter: 0 Wheelchair Ramp: Yes One/Two Story Residence: One story Living Alone: Yes Support Systems: Child(mt) Patient Expects to be Discharged to[de-identified] Rehabilitation facility Current DME Used/Available at Home: Wheelchair, [employer] [place], [address] chair Prior Level of Function/Work/Activity: 
Walker and Wheelchair at baseline Number of Personal Factors/Comorbidities that affect the Plan of Care: 1-2: MODERATE COMPLEXITY EXAMINATION:  
Most Recent Physical Functioning:  
Gross Assessment: 
  
         
  
Posture: 
  
Balance: 
Sitting: Impaired Sitting - Static: Fair (occasional) Sitting - Dynamic: Fair (occasional) Bed Mobility: 
Supine to Sit: Maximum assistance;Assist x2 Scooting: Total assistance Interventions: Manual cues; Verbal cues; Visual cues Wheelchair Mobility: 
  
Transfers: 
Sliding Board : Assist x2 Level of Assistance: Total assistance Interventions: Safety awareness training;Manual cues; Visual cues; Verbal cues Gait: 
  
   
  
Body Structures Involved: 1. Heart 2. Digestive Structures 3. Bones 4. Joints 5. Muscles 6. Ligaments Body Functions Affected: 1. Sensory/Pain 2. Cardio 3. Neuromusculoskeletal 
4. Movement Related Activities and Participation Affected: 1. Learning and Applying Knowledge 2. General Tasks and Demands 3. Mobility 4. Self Care 5. Community, Social and [place] Number of elements that affect the Plan of Care: 4+: HIGH COMPLEXITY CLINICAL PRESENTATION:  
Presentation: Evolving clinical presentation with changing clinical characteristics: MODERATE COMPLEXITY CLINICAL DECISION MAKING:  
M [facility] AM-PAC 6 Clicks Basic Mobility Inpatient Short Form How much difficulty does the patient currently have. .. Unable A Lot A Little None 1. Turning over in bed (including adjusting bedclothes, sheets and blankets)?    [] 1   [] 2   [x] 3   [] 4  
 2.  Sitting down on and standing up from a chair with arms ( e.g., wheelchair, bedside commode, etc.)   [] 1   [x] 2   [] 3   [] 4  
3. Moving from lying on back to sitting on the side of the bed? [] 1   [x] 2   [] 3   [] 4 How much help from another person does the patient currently need. .. Total A Lot A Little None 4. Moving to and from a bed to a chair (including a wheelchair)? [] 1   [x] 2   [] 3   [] 4  
5. Need to walk in hospital room? [] 1   [x] 2   [] 3   [] 4  
6. Climbing 3-5 steps with a railing? [x] 1   [] 2   [] 3   [] 4  
© 2007, Trustees of [address] [place] [address] [address] [id number], under license to [employer]. All rights reserved Score:  Initial: 12 Most Recent: X (Date: 09/27/18 ) Interpretation of Tool:  Represents activities that are increasingly more difficult (i.e. Bed mobility, Transfers, Gait). Score 24 23 22-20 19-15 14-10 9-7 6 Modifier CH CI CJ CK CL CM CN   
 
? Mobility - Walking and Moving Around:  
  - CURRENT STATUS: CL - 60%-79% impaired, limited or restricted  - GOAL STATUS: CK - 40%-59% impaired, limited or restricted  - D/C STATUS:  ---------------To be determined--------------- [place] / [place] / Product Type: Medicare /   
 
Medical Necessity:    
· Patient is expected to demonstrate progress in strength, range of motion, balance, coordination and functional technique to decrease assistance required with functional mobility and activity pacing. Reason for Services/Other Comments: 
· Patient continues to require present interventions due to patient's inability to perform bed mobility, transfer or ambulate without high level of dependence. Marlon Ordonez Use of outcome tool(s) and clinical judgement create a POC that gives a: Questionable prediction of patient's progress: MODERATE COMPLEXITY  
  
 
 
 
TREATMENT:  
  
Pre-treatment Symptoms/Complaints:  \"You're still small. Go home and eat a big supper tonight. \" 
Pain: Initial: Pain Intensity 1: 0  Post Session:    
 
Therapeutic Activity: (  15 minutes): Therapeutic activities including Bed transfers, Chair transfers, Sequencing tasks, and perform mobility with weight bearing precautions to improve mobility, strength, balance and coordination. Required maximal cues for safety, weight bearing precautions, and facilitation of muscle activation to promote static and dynamic balance in sitting and static balance in sitting. More time needed to perform all training during treatment due to her needing encouragement that she is safe. Date: 
09/27/18 Date: 
 Date: Activity/Exercise Parameters Parameters Parameters Seated Heel Raise 1x10/Foot Seated Toes Raise 1x10/Foot Seated LAQ (AAROM on LT) 1x10/Leg Seated Hip ABD (AAROM on LT) 1x5/Leg Braces/Orthotics/Lines/Etc:  
· O2 Device: Nasal cannula · Bilateral SCDs Treatment/Session Assessment:   
· Response to Treatment:  Happy with experiencing little pain, and is eager to work with therapy tomorrow. · Interdisciplinary Collaboration:  
o Physical Therapist 
o Registered Nurse 
o Certified Nursing Assistant/Patient Care Technician 
o SPT · After treatment position/precautions:  
o Supine in bed 
o Bed alarm/tab alert on 
o Bed/Chair-wheels locked 
o Call light within reach 
o RN notified · Compliance with Program/Exercises: Will assess as treatment progresses. · Recommendations/Intent for next treatment session: \"Next visit will focus on advancements to more challenging activities\". Total Treatment Duration: PT Patient Time In/Time Out Time In: 1520 Time Out: 7017 Dandre Kate DPT

## 2018-09-28 NOTE — PROGRESS NOTES
Pt continues to be tachycardic during tx. Called primary nurse for Cardizem. Gave medication at [address] Jerardo Mcmullen [place]. See MAR for details. Will continue to monitor.

## 2018-09-28 NOTE — PROGRESS NOTES
Problem: Mobility Impaired (Adult and Pediatric) Goal: *Acute Goals and Plan of Care (Insert Text) STG: 
(1.)Ms. Gómez Browne will move from supine to sit and sit to supine  and scoot up and down with MINIMAL ASSIST within 3 treatment day(s). (2.)Ms. Gómez Browne will transfer from bed to chair and chair to bed with MINIMAL ASSIST using the least restrictive device within 3 treatment day(s). (3.)Ms. Gómez Browne will ambulate with MODERATE ASSIST for 15 feet with the least restrictive device within 3 treatment day(s). (4.)Ms. Gómez Browne will perform wheelchair mobility 100 feet with Stand by Assist within 3 days to maximize independence with functional mobility. LTG: 
(1.)Ms. Gómez Browne will move from supine to sit and sit to supine  and scoot up and down in bed with CONTACT GUARD ASSIST within 7 treatment day(s). (2.)Ms. Gómez Browne will transfer from bed to chair and chair to bed with CONTACT GUARD ASSIST using the least restrictive device within 7 treatment day(s). (3.)Ms. Gómez Browne will ambulate with MINIMAL ASSIST for 30 feet with the least restrictive device within 7 treatment day(s). (4.)Ms. Gómez Browne will perform wheelchair mobility 100 feet with Mod I within 7 days to maximize independence with functional mobility. 
 
_______________________________________________________________________________________________ PHYSICAL THERAPY: Daily Note, Treatment Day: 1st, AM 9/28/2018 INPATIENT: Hospital Day: 5 [place] / Product Type: Medicare /   
NWB LT Hip NAME/AGE/GENDER: Gurvinder Bradford is a 80 y.o. female PRIMARY DIAGNOSIS: Fracture, hip ([facility] Fractured Left Hip Closed fracture of left hip (HCC) Closed fracture of left hip ([facility] Procedure(s) (LRB): LEFT FEMUR INSERTION INTRA MEDULLARY NAIL (Left) 2 Days Post-Op ICD-10: Treatment Diagnosis:  
 · Generalized Muscle Weakness (M62.81) · Other lack of cordination (R27.8) · Difficulty in walking, Not elsewhere classified (R26.2) · Other abnormalities of gait and mobility (R26.89) · Repeated Falls (R29.6) · Unspecified Lack of Coordination (R27.9) Precaution/Allergies: 
Adhesive ASSESSMENT:  
 
Ms. Evans Boyd is 80 y.o. female who presents to physical therapy s/p LT hip IM nailing surgery. Pt was seen shortly after she returned from her HD treatment. Showing improvement by using more forward flexion during transfers, but it still not significant enough for her off weight hips. Needs encouragement and increased time to perform bed mobility and transfers. Still Mod A x 2 for bed mobility. This will improve as she is able to sequence mobility more efficiently and improves trunk and BLE strength. Still unable to ambulate and shows decreased ability to maintain NWB precautions. She needs to continue multiple reps with transfer techniques in order to feel more secure with movements so she can safely transition into mobility training. Pt was left seated in chair, chair alarm active, daughter at bedside, and call button in reach. This section established at most recent assessment PROBLEM LIST (Impairments causing functional limitations): 1. Decreased Strength 2. Decreased ADL/Functional Activities 3. Decreased Transfer Abilities 4. Decreased Ambulation Ability/Technique 5. Decreased Balance 6. Increased Pain 7. Decreased Activity Tolerance 8. Decreased Pacing Skills 9. Increased Fatigue 10. Decreased Flexibility/Joint Mobility 11. Decreased Knowledge of Precautions 12. Decreased [place] with Home Exercise Program 
 INTERVENTIONS PLANNED: (Benefits and precautions of physical therapy have been discussed with the patient.) 1. Balance Exercise 2. Bed Mobility 3. Family Education 4. Gait Training 5. Home Exercise Program (HEP) 6. Manual Therapy 7. Neuromuscular Re-education/Strengthening 8. Range of Motion (ROM) 9. Therapeutic Activites 10. Therapeutic Exercise/Strengthening 11. Transfer Training 12. Group Therapy TREATMENT PLAN: Frequency/Duration: twice daily for duration of hospital stay Rehabilitation Potential For Stated Goals: Good RECOMMENDED REHABILITATION/EQUIPMENT: (at time of discharge pending progress): Due to the probability of continued deficits (see above) this patient will likely need continued skilled physical therapy after discharge. Equipment:  
? None at this time HISTORY:  
History of Present Injury/Illness (Reason for Referral): Cristina Ortega is a 80 y.o. female who came to ER due to s/p fall and could not get up.  
  
Patient is an ESRD patient on HD for the past 8 years. She had HD today without problems. Today she was having lunch and then slid down from her chair and landed on the floor. It is unclear if she hit her head or lost her consciousness for a short time or not. She landed on her left hip and left side of body. She could not get up.  
  
No shortness of breath. No dizziness. No chest pain. No fever. No shaking. No chills.  
  
In ER she is found to have fracture of left hip and is being admitted for further management.  
  
She has history of chronic AF and used to be on Warfarin. She did not want to be on it and refused that. Her cardiologist is aware of this as per her daughter who is the informant at the bedside. Past Medical History/Comorbidities: Ms. Gera Painter  has a past medical history of Abdominal pain, chronic, right lower quadrant (5/16/2013); Anticoagulated on Coumadin; Aortic stenosis (06/26/2015); Arthritis; Atrial fibrillation, chronic ([facility] (1/14/2016); CAD (coronary artery disease) (06/2013); Carotid stenosis without Infarct (1/19/2016); Chronic anemia (6/26/2013); Crohn's disease ([facility] Dyslipidemia (6/26/2013); Edema (1/19/2016); ESRD (end stage renal disease) on dialysis ([facility] (05/16/2013);  Former smoker; GERD (gastroesophageal reflux disease); Gout (6/26/2013); History of ileostomy (5/16/2013); History of peritonitis (2013); History of sepsis (2009); Hypercholesteremia; Hyperlipidemia (1/19/2016); Hypertension; Hypokalemia (5/16/2013); Hypomagnesemia (6/23/2009); Ileostomy in place [facility]); Intractable nausea and vomiting (5/16/2013); Leukocytosis (5/16/2013); NSTEMI (non-ST elevated myocardial infarction) ([facility] (6/26/2013); Palpitations (1/19/2016); Paroxysmal atrial fibrillation ([facility] [facility] (1/19/2016); Paroxysmal tachycardia ([facility] [facility] (1/19/2016); S/P AAA repair; Serum lipase elevation (5/16/2013); and Tricuspid regurgitation (EF 55-60%). She also has no past medical history of Adverse effect of anesthesia; Difficult intubation; Malignant hyperthermia due to anesthesia; or Pseudocholinesterase deficiency. Ms. [facility]  has a past surgical history that includes hx cataract removal (Bilateral, 2005); hx lap cholecystectomy (2013); pr abdomen surgery proc unlisted; hx colectomy (1983); hx breast biopsy (Bilateral,  ); vascular surgery procedure unlist (Left, 2009); vascular surgery procedure unlist; vascular surgery procedure unlist (Right, 2014); hx aaa repair (2013); and colonoscopy (N/A, 10/20/2016). Social History/Living Environment:  
Home Environment: Private residence # Steps to Enter: 0 Wheelchair Ramp: Yes One/Two Story Residence: One story Living Alone: Yes Support Systems: Child(mt) Patient Expects to be Discharged to[de-identified] Rehabilitation facility Current DME Used/Available at Home: Wheelchair, [address], [address] chair Prior Level of Function/Work/Activity: 
Walker and Wheelchair at baseline Number of Personal Factors/Comorbidities that affect the Plan of Care: 1-2: MODERATE COMPLEXITY EXAMINATION:  
Most Recent Physical Functioning:  
Gross Assessment: 
AROM: Grossly decreased, non-functional 
PROM: Generally decreased, functional 
Strength: Grossly decreased, non-functional 
Sensation: Impaired Posture: 
Posture (WDL): Exceptions to [facility] Posture Assessment: Forward head, Rounded shoulders, Trunk flexion Balance: 
Sitting: Impaired Sitting - Static: Fair (occasional); Prop sitting Sitting - Dynamic: Fair (occasional) Standing: Impaired Standing - Dynamic : Poor Bed Mobility: 
Rolling: Moderate assistance Supine to Sit: Moderate assistance;Assist x2 Scooting: Moderate assistance Level of Assistance: Assist X2 Interventions: Demonstration;Manual cues; Safety awareness training;Verbal cues; Weight shifting training/Pressure relief Wheelchair Mobility: 
  
Transfers: 
Stand Pivot Transfers: Moderate assistance Bed to Chair: Moderate assistance;Assist x2 Level of Assistance: Moderate assistance Interventions: Manual cues; Safety awareness training;Verbal cues; Weight shifting training/Pressure relief;Visual cues Gait: 
Right Side Weight Bearing: Full Left Side Weight Bearing: Non-weight bearing Base of Support: Center of gravity altered;Shift to right Body Structures Involved: 1. Heart 2. Digestive Structures 3. Bones 4. Joints 5. Muscles 6. Ligaments Body Functions Affected: 1. Sensory/Pain 2. Cardio 3. Neuromusculoskeletal 
4. Movement Related Activities and Participation Affected: 1. Learning and Applying Knowledge 2. General Tasks and Demands 3. Mobility 4. Self Care 5. Community, Social and [place] Number of elements that affect the Plan of Care: 4+: HIGH COMPLEXITY CLINICAL PRESENTATION:  
Presentation: Evolving clinical presentation with changing clinical characteristics: MODERATE COMPLEXITY CLINICAL DECISION MAKIN [facility] [address] [id number] AM-PAC 6 Clicks Basic Mobility Inpatient Short Form How much difficulty does the patient currently have. .. Unable A Lot A Little None 1. Turning over in bed (including adjusting bedclothes, sheets and blankets)? [] 1   [] 2   [x] 3   [] 4  
2.   Sitting down on and standing up from a chair with arms ( e.g., wheelchair, bedside commode, etc.)   [] 1   [x] 2   [] 3   [] 4  
3. Moving from lying on back to sitting on the side of the bed? [] 1   [x] 2   [] 3   [] 4 How much help from another person does the patient currently need. .. Total A Lot A Little None 4. Moving to and from a bed to a chair (including a wheelchair)? [] 1   [x] 2   [] 3   [] 4  
5. Need to walk in hospital room? [] 1   [x] 2   [] 3   [] 4  
6. Climbing 3-5 steps with a railing? [x] 1   [] 2   [] 3   [] 4  
© 2007, Trustees of [facility] [employer], under license to [employer]. All rights reserved Score:  Initial: 12 Most Recent: X (Date: 09/27/18 ) Interpretation of Tool:  Represents activities that are increasingly more difficult (i.e. Bed mobility, Transfers, Gait). Score 24 23 22-20 19-15 14-10 9-7 6 Modifier CH CI CJ CK CL CM CN   
 
? Mobility - Walking and Moving Around:  
  - CURRENT STATUS: CL - 60%-79% impaired, limited or restricted  - GOAL STATUS: CK - 40%-59% impaired, limited or restricted  - D/C STATUS:  ---------------To be determined--------------- [place] / [place] B / Product Type: Medicare /   
 
Medical Necessity:    
· Patient is expected to demonstrate progress in strength, range of motion, balance, coordination and functional technique to decrease assistance required with functional mobility and activity pacing. Reason for Services/Other Comments: 
· Patient continues to require present interventions due to patient's inability to perform bed mobility, transfer or ambulate without high level of dependence. Malachi Shaw Use of outcome tool(s) and clinical judgement create a POC that gives a: Questionable prediction of patient's progress: MODERATE COMPLEXITY  
  
 
 
 
TREATMENT:  
  
Pre-treatment Symptoms/Complaints:  \"You're still small. Go home and eat a big supper tonight. \" 
Pain: Initial:  
Pain Intensity 1: 0 Pain Location 1: Hip Pain Orientation 1: Left  Post Session:    
 
Therapeutic Activity: (  25 minutes): Therapeutic activities including Bed transfers, Chair transfers, Sequencing tasks, and perform mobility with weight bearing precautions to improve mobility, strength, balance and coordination. Required maximal cues for safety, weight bearing precautions, and facilitation of muscle activation to promote static and dynamic balance in sitting and static balance in standing. More time needed to perform all training during treatment due to her needing encouragement that she is safe. Date: 
09/27/18 Date: 
 Date: Activity/Exercise Parameters Parameters Parameters Seated Heel Raise 1x10/Foot Seated Toes Raise 1x10/Foot Seated LAQ (AAROM on LT) 1x10/Leg Seated Hip ABD (AAROM on LT) 1x5/Leg Braces/Orthotics/Lines/Etc:  
· IV 
· O2 Device: Nasal cannula · Bilateral SCDs Treatment/Session Assessment:   
· Response to Treatment:  Happy with experiencing little pain, and is eager to work with therapy tomorrow. · Interdisciplinary Collaboration:  
o Physical Therapist 
o Registered Nurse 
o SPT · After treatment position/precautions:  
o Up in chair 
o Bed alarm/tab alert on 
o Bed/Chair-wheels locked 
o Call light within reach 
o RN notified 
o Family at bedside · Compliance with Program/Exercises: Will assess as treatment progresses. · Recommendations/Intent for next treatment session: \"Next visit will focus on advancements to more challenging activities\". Total Treatment Duration: PT Patient Time In/Time Out [facility]: 1100 [facility]  
SPT, CSCS

## 2018-09-28 NOTE — PROGRESS NOTES
Declined by [facility] but accepted at [facility]. Daughter accepted facility. Patient is ready for transfer once medically cleared. CM will continue to follow.

## 2018-09-28 NOTE — CONSULTS
Geriatric Fracture Consult  Patient: Aurelia Bajwa  YOB: 1933   MRN: [id number]      Consult Date: 9/26/2018     Consulting Physician: DR Geni Martinez    Chief Complaint: LEFT INTERTROCHANTERIC/SUBTROCHANTERIC HIP FRACTURE; OSTEOPOROSIS  History of Present Illness: Delphine Maurer is an 85.o.  female who is being seen for left hip pain after sustaining a fall at home while eating lunch. Onset of symptoms was abrupt with unchanged course since that time. The pain is located in the left hip. Patient describes the pain as continuous and rated as moderate. Pain has been associated with movement. Patient denies other injuries. Review of Systems: A comprehensive review of systems was negative except for that written in the History of Present Illness.   ED Presentation Time: < 8 hours  Mechanism of Injury: Fall from standing  Ambulatory Status: [facility] and [facility]  Past Medical History:   Past Medical History:   Diagnosis Date    Abdominal pain, chronic, right lower quadrant 5/16/2013    Anticoagulated on Coumadin     Aortic stenosis 06/26/2015    mild to moderate per echo     Arthritis     minimal    Atrial fibrillation, chronic ([facility] [address] 1/14/2016    CAD (coronary artery disease) 06/2013    mild MI     Carotid stenosis without Infarct 1/19/2016    Chronic anemia 6/26/2013    Crohn's disease ([facility]     colectomy    Dyslipidemia 6/26/2013    Edema 1/19/2016    ESRD (end stage renal disease) on dialysis ([facility] [address] 05/16/2013    right arm functioning- fistula----M-W-F AT [id number]    Former smoker     GERD (gastroesophageal reflux disease)     controlled on meds takes prilosec    Gout 6/26/2013    History of ileostomy 5/16/2013    History of peritonitis 2013    dialysis associated    History of sepsis 2009    Hypercholesteremia     Hyperlipidemia 1/19/2016    Hypertension      well controlled by medication    Hypokalemia 5/16/2013    Hypomagnesemia 6/23/2009    Ileostomy in place [facility])     Intractable nausea and vomiting 5/16/2013    Leukocytosis 5/16/2013    NSTEMI (non-ST elevated myocardial infarction) ([facility] 6/26/2013    Palpitations 1/19/2016    Paroxysmal atrial fibrillation ([facility] 1/19/2016    Paroxysmal tachycardia ([facility] 1/19/2016    S/P AAA repair     \" aneurysm in my stomach repaired\"    Serum lipase elevation 5/16/2013    Tricuspid regurgitation EF 55-60%    moderate to severe per echo 6/26/15       Allergies: Allergies   Allergen Reactions    Adhesive Other (comments)     Skin tears        Past Surgical History:   Past Surgical History:   Procedure Laterality Date    ABDOMEN SURGERY PROC UNLISTED      insertion and removal of infected PD cath    COLONOSCOPY N/A 10/20/2016    ILEOSCOPY THROUGH OSTOMY/ 25 performed by [facility]rer [facility] HX AAA REPAIR  2013    HX BREAST BIOPSY Bilateral       x3 on left breast    HX CATARACT REMOVAL Bilateral 2005    wit IOL    HX COLECTOMY  1983    colon removed / ileostomy    HX LAP CHOLECYSTECTOMY  2013    VASCULAR SURGERY PROCEDURE UNLIST Left 2009    fistulagram 8/09, LUE    VASCULAR SURGERY PROCEDURE UNLIST      7/09 L subclavian hemodialysis cath    VASCULAR SURGERY PROCEDURE UNLIST Right 2014    fistula      Social History:   Social History     Social History    Marital status:      Spouse name: N/A    Number of children: N/A    Years of education: N/A     Occupational History    Not on file.      Social History Main Topics    Smoking status: Former Smoker     Packs/day: 0.25     Years: 5.00     Quit date: 8/22/1971    Smokeless tobacco: Never Used      Comment: quit years ago    Alcohol use No    Drug use: No    Sexual activity: Not on file     Other Topics Concern    Not on file     Social History Narrative      FAMILY HISTORY - REVIEWED - NO PERTINENT FAMILY HISTORY  Dwelling Status: Alone  Current Anticoagulant Medications: Aspirin 81 MG/DAY  History of falls: YES  Prior Fractures: DENIES  Osteoporosis Medications: none  Bone Density Tests: UNKNOWN  X-RAYS: Left Intertrochanteric/Subtrochanteric Fracture  Physical Exam:   PATIENT COMPLAINING OF LEFT HIP PAIN AFTER A FALL AT HOME. FOCUSED MUSCULOSKELETAL EXAM REVEALS DECREASED ROM TO LEFT LE. THERE IS SHORTENING AND EXTERNAL ROTATION NOTED TO LEFT LE. PATIENT IS TENDER TO PALPATION OVER LEFT HIP AND GROIN. PATIENT HAS PAIN WITH LOG ROLLING. N/V INTACT. DENIES OTHER INJURIES.     Assessment / Plan: ORIF LEFT IT/ST FRACTURE WITH IM NAIL; CONSULT PT/OT - NWB LEFT LE; STR  RISKS AND BENEFITS WERE ADDRESSED WITH PATIENT AND FAMILY  Labs:    Lab Results   Component Value Date/Time    HGB 9.2 (L) 09/28/2018 05:39 AM    WBC 5.9 09/28/2018 05:39 AM    INR 1.1 09/24/2018 10:50 PM    Albumin 2.9 (L) 09/28/2018 05:39 AM      Preoperative Clearance: YES by Hospitalist and Cardiologist          Signed by: Juanjo Villar NP   Today's Date: September 28, 2018

## 2018-09-28 NOTE — PROGRESS NOTES
PHYSICAL THERAPY Pt is in HD per chart review this AM. Will attempt to see her when she returns to floor from Dialysis treatment in order to resume BID status.  
 
Thank Robby Moses, SPT

## 2018-09-28 NOTE — PROGRESS NOTES
[facility] Progress Note Follow-Up on: ESRD Patient seen and examined on HD RUE AVF cannulated 400 Qb, UF 1500, she complains of nausea with one episode of vomiting this morning, on anti emetic. Chart and labs reviewed. ROS: 
Gen - no fever, no chills CV - no chest pain, no palpitation Lung - no shortness of breath, no cough Abd - no tenderness, + nausea/vomiting, no diarrhea Ext - no edema, + hip pain Exam: 
Vitals:  
 09/27/18 2252 09/28/18 6592 09/28/18 8132 09/28/18 4743 BP: 93/48 117/56 99/44 95/52 Pulse: 89 81 79 (!) 116 Resp: 17 15 Temp: 98.6 °F (37 °C) 98.5 °F (36.9 °C) SpO2: 95% 96% Weight:      
Height:      
 
 
 
Intake/Output Summary (Last 24 hours) at 09/28/18 6799 Last data filed at 09/27/18 1304 Gross per 24 hour Intake                0 ml Output              300 ml Net             -300 ml Wt Readings from Last 3 Encounters:  
09/24/18 64.9 kg (143 lb)  
09/20/18 63 kg (138 lb 12.8 oz) 09/10/18 66.2 kg (146 lb) GEN - in no distress CV - irregular, no murmur, no rub Lung - clear bilaterally, no wheezes Abd - soft, nontender, +BS Ext - no edema Access- RUE AVF Cannulated Recent Labs  
   09/28/18 
 0539  09/27/18 
 0510  09/26/18 
 0447 WBC  5.9  5.5  7.4 HGB  9.2*  9.5*  10.2* HCT  28.1*  29.3*  32.0*  
PLT  215  170  173 Recent Labs  
   09/28/18 
 0539  09/27/18 
 0510  09/26/18 
 0447 NA  133*  138  136  
K  4.3  4.4  5.0  
CL  92*  100  98 CO2  29  27  25 BUN  46*  28*  24* CREA  7.49*  5.25*  5.58* CA  8.4  8.1*  8.4 GLU  128*  139*  127* Assessment / Plan: 
Principal Problem: 
  Closed fracture of left hip ([address] (9/24/2018) Active Problems: 
  ESRD (end stage renal disease) on dialysis ([facility] (5/16/2013) Dyslipidemia (6/26/2013) Chronic anemia (6/26/2013) Atrial fibrillation, chronic ([facility] (1/14/2016) Hypertension, benign (1/19/2016) CAD (coronary artery disease) (1/19/2016) Overview: ASCAD - 50% LAD and RCA by cath 2 years ago Fall (9/24/2018) Fracture, hip ([address] (9/24/2018) 1. ESRD - Outpatient unit  [facility] 
-hyperkalemia resolved with HD 
- Next HD Monday for clearance and volume 2. Hip fracture - s/p ORIF 9/26 3. Afib

## 2018-09-28 NOTE — PROGRESS NOTES
Problem: Self Care Deficits Care Plan (Adult) Goal: *Acute Goals and Plan of Care (Insert Text) 1. Patient will maintain NWB status in LLE for 100% of treatment session and minimal verbal cues from therapist.  
2. Patient will complete functional transfers with minimal assistance while maintaining NWB status in LLE and with adaptive equipment as needed. 3. Patient will complete lower body bathing and dressing with minimal assistance and adaptive equipment as needed. 4. Patient will complete toileting and toilet transfer with minimal assistance. 5. Patient will tolerate 23 minutes of OT treatment with less than 4 rest breaks to increase activity tolerance for ADLs. 6. Patient will participate in at least 15 minutes of BUE therapeutic exercises to strengthen BUE for functional transfers. Timeframe: 7 visits Comments: OCCUPATIONAL THERAPY: Daily Note, Treatment Day: 2nd and PM  
 9/28/2018 INPATIENT: Hospital Day: 5 [place] / Product Type: Medicare /  
  
NAME/AGE/GENDER: Gurvinder Bradford is a 80 y.o. female PRIMARY DIAGNOSIS:  Fracture, hip ([address] Fractured Left Hip Closed fracture of left hip (HCC) Closed fracture of left hip ([address] Procedure(s) (LRB): LEFT FEMUR INSERTION INTRA MEDULLARY NAIL (Left) 2 Days Post-Op ICD-10: Treatment Diagnosis:  
 · Generalized Muscle Weakness (M62.81) Precautions/Allergies: 
  fall risk, NWB LLE Adhesive ASSESSMENT:  
 
Ms. Gómez Browne presents to hospital for above. 9/28/2018 Pt presents up in the chair upon arrival. Pt stated she was not comfortable and therapist and PCT repositioned pt in the chair with better postural alignment. Pt completed a few exercises and then stated \"I'm too weak for this. \" Pt then stated \"I want to brush my teeth. \" Therapist began to set pt up for above task and then stated \"I'm not brushing them, you are. \" Therapist explained to pt that she needed to brush her own teeth to help her get stronger and then pt declined completing activity. Pt was left up in the chair as found. Pt very uncooperative today. Will continue therapeutic effort. This section established at most recent assessment PROBLEM LIST (Impairments causing functional limitations): 1. Decreased Strength 2. Decreased ADL/Functional Activities 3. Decreased Transfer Abilities 4. Decreased Balance 5. Increased Pain 6. Decreased Activity Tolerance INTERVENTIONS PLANNED: (Benefits and precautions of occupational therapy have been discussed with the patient.) 1. Activities of daily living training 2. Adaptive equipment training 3. Balance training 4. Clothing management 5. Donning&doffing training 6. Therapeutic activity 7. Therapeutic exercise TREATMENT PLAN: Frequency/Duration: Follow patient 6x/week to address above goals. Rehabilitation Potential For Stated Goals: Fair RECOMMENDED REHABILITATION/EQUIPMENT: (at time of discharge pending progress): Due to the probability of continued deficits (see above) this patient will likely need continued skilled occupational therapy after discharge. Equipment:  
? None at this time OCCUPATIONAL PROFILE AND HISTORY:  
History of Present Injury/Illness (Reason for Referral): 
See H&P Past Medical History/Comorbidities: Ms. Hong Vizcaino  has a past medical history of Abdominal pain, chronic, right lower quadrant (5/16/2013); Anticoagulated on Coumadin; Aortic stenosis (06/26/2015); Arthritis; Atrial fibrillation, chronic ([facility] (1/14/2016); CAD (coronary artery disease) (06/2013); Carotid stenosis without Infarct (1/19/2016); Chronic anemia (6/26/2013); Crohn's disease ([address] Dyslipidemia (6/26/2013); Edema (1/19/2016); ESRD (end stage renal disease) on dialysis ([address] (05/16/2013); Former smoker; GERD (gastroesophageal reflux disease); Gout (6/26/2013);  History of ileostomy (5/16/2013); History of peritonitis (2013); History of sepsis (2009); Hypercholesteremia; Hyperlipidemia (1/19/2016); Hypertension; Hypokalemia (5/16/2013); Hypomagnesemia (6/23/2009); Ileostomy in place [facility]); Intractable nausea and vomiting (5/16/2013); Leukocytosis (5/16/2013); NSTEMI (non-ST elevated myocardial infarction) ([facility] (6/26/2013); Palpitations (1/19/2016); Paroxysmal atrial fibrillation ([facility] (1/19/2016); Paroxysmal tachycardia ([facility] (1/19/2016); S/P AAA repair; Serum lipase elevation (5/16/2013); and Tricuspid regurgitation (EF 55-60%). She also has no past medical history of Adverse effect of anesthesia; Difficult intubation; Malignant hyperthermia due to anesthesia; or Pseudocholinesterase deficiency. Ms. Melyssa Galindo  has a past surgical history that includes hx cataract removal (Bilateral, 2005); hx lap cholecystectomy (2013); pr abdomen surgery proc unlisted; hx colectomy (1983); hx breast biopsy (Bilateral,  ); vascular surgery procedure unlist (Left, 2009); vascular surgery procedure unlist; vascular surgery procedure unlist (Right, 2014); hx aaa repair (2013); and colonoscopy (N/A, 10/20/2016). Social History/Living Environment:  
Home Environment: Private residence # Steps to Enter: 0 Wheelchair Ramp: Yes One/Two Story Residence: One story Living Alone: Yes Support Systems: Child(mt) Patient Expects to be Discharged to[de-identified] Rehabilitation facility Current DME Used/Available at Home: Wheelchair, [place], [address] chair Prior Level of Function/Work/Activity: 
[place] to mod I with ADLs Personal Factors:   
      Sex:  female Age:  80 y.o. Number of Personal Factors/Comorbidities that affect the Plan of Care: Expanded review of therapy/medical records (1-2):  MODERATE COMPLEXITY ASSESSMENT OF OCCUPATIONAL PERFORMANCE[de-identified]  
Activities of Daily Living:  
Basic ADLs (From Assessment) Complex ADLs (From Assessment) Feeding: Setup Oral Facial Hygiene/Grooming: Minimum assistance Bathing: Moderate assistance Upper Body Dressing: Setup Lower Body Dressing: Moderate assistance Toileting: Moderate assistance Grooming/Bathing/Dressing Activities of Daily Living Bed/Mat Mobility Rolling: Moderate assistance Supine to Sit: Moderate assistance;Assist x2 Bed to Chair: Moderate assistance;Assist x2 Scooting: Moderate assistance Most Recent Physical Functioning:  
Gross Assessment: 
  
         
  
Posture: 
Posture (WDL): Exceptions to [facility] Posture Assessment: Forward head, Rounded shoulders, Trunk flexion Balance: 
Sitting: Impaired Sitting - Static: Fair (occasional); Prop sitting Sitting - Dynamic: Fair (occasional) Standing: Impaired Standing - Dynamic : Poor Bed Mobility: 
Rolling: Moderate assistance Supine to Sit: Moderate assistance;Assist x2 Scooting: Moderate assistance Level of Assistance: Assist X2 Interventions: Demonstration;Manual cues; Safety awareness training;Verbal cues; Weight shifting training/Pressure relief Wheelchair Mobility: 
  
Transfers: 
Stand Pivot Transfers: Moderate assistance Bed to Chair: Moderate assistance;Assist x2 Level of Assistance: Moderate assistance Interventions: Manual cues; Safety awareness training;Verbal cues; Weight shifting training/Pressure relief;Visual cues Patient Vitals for the past 6 hrs: 
 BP BP Patient Position SpO2 Pulse  
09/28/18 0913 114/54 - - (!) 110  
09/28/18 1001 (!) 88/47 - - 95  
09/28/18 1027 107/59 - - (!) 115  
09/28/18 1035 102/57 - - (!) 107  
09/28/18 1140 102/56 At rest 96 % (!) 112 Mental Status Neurologic State: Alert Orientation Level: Oriented to person, Oriented to place, Oriented to situation, Disoriented to time Cognition: Follows commands Perception: Appears intact Perseveration: No perseveration noted Safety/Judgement: Fall prevention Physical Skills Involved: 
1. Balance 2. Strength 3. Activity Tolerance 4. Pain (acute) Cognitive Skills Affected (resulting in the inability to perform in a timely and safe manner): 
1. n/a Psychosocial Skills Affected: 1. Habits/Routines 2. Environmental Adaptation 3. Social Roles Number of elements that affect the Plan of Care: 5+:  HIGH COMPLEXITY CLINICAL DECISION MAKIN[address] [place] [address] AM-PAC 6 Clicks Daily Activity Inpatient Short Form How much help from another person does the patient currently need. .. Total A Lot A Little None 1. Putting on and taking off regular lower body clothing? [] 1   [x] 2   [] 3   [] 4  
2. Bathing (including washing, rinsing, drying)? [] 1   [x] 2   [] 3   [] 4  
3. Toileting, which includes using toilet, bedpan or urinal?   [] 1   [x] 2   [] 3   [] 4  
4. Putting on and taking off regular upper body clothing? [] 1   [] 2   [x] 3   [] 4  
5. Taking care of personal grooming such as brushing teeth? [] 1   [] 2   [x] 3   [] 4  
6. Eating meals? [] 1   [] 2   [x] 3   [] 4  
© 2007, Trustees of [address] [place] [address], under license to [employer]. All rights reserved Score:  Initial: 15 Most Recent: X (Date: -- ) Interpretation of Tool:  Represents activities that are increasingly more difficult (i.e. Bed mobility, Transfers, Gait). Score 24 23 22-20 19-15 14-10 9-7 6 Modifier CH CI CJ CK CL CM CN   
 
? Self Care:  
  - CURRENT STATUS: CK - 40%-59% impaired, limited or restricted  - GOAL STATUS: CJ - 20%-39% impaired, limited or restricted  - D/C STATUS:  ---------------To be determined--------------- [place] / [place] / Product Type: Medicare /   
 
Medical Necessity:    
· Patient is expected to demonstrate progress in strength, balance and functional technique to increase independence with ADLs.  
Reason for Services/Other Comments: 
· Patient continues to require skilled intervention due to medical complications and patient unable to attend/participate in therapy as expected. Use of outcome tool(s) and clinical judgement create a POC that gives a: MODERATE COMPLEXITY  
 
 
 
TREATMENT:  
(In addition to Assessment/Re-Assessment sessions the following treatments were rendered) Pre-treatment Symptoms/Complaints:   
Pain: Initial:  
none Post Session:  same Therapeutic Exercise: (8 minutes):  Exercises per grid below to improve mobility and strength. Required minimal visual, verbal and tactile cues to promote proper body mechanics. Progressed range as indicated. Date: 
9/28/18 Date: 
 Date: Activity/Exercise Parameters Parameters Parameters Shoulder flexion/extension 2 sets of 5 reps Shoulder horizontal add/abb 2 sets of 5 reps Punches 2 sets of 5 reps Elbow flexion/extension 2 sets of 5 reps Braces/Orthotics/Lines/Etc:  
· room air Treatment/Session Assessment:   
· Response to Treatment:  Tolerated fair · Interdisciplinary Collaboration:  
o Certified Occupational Therapy Assistant 
o Registered Nurse · After treatment position/precautions:  
o Up in chair 
o Bed alarm/tab alert on 
o Bed/Chair-wheels locked 
o Call light within reach 
o RN notified · Compliance with Program/Exercises: compliant all of the time. · Recommendations/Intent for next treatment session: \"Next visit will focus on advancements to more challenging activities and reduction in assistance provided\". Total Treatment Duration: OT Patient Time In/Time Out Time In: 1440 Time Out: 4356 Saray Donahue

## 2018-09-28 NOTE — PROGRESS NOTES
Dialysis line clotted during tx and new lines set up. Treatment resumed without difficulty. Will continue to monitor.

## 2018-09-28 NOTE — DIALYSIS
TRANSFER OUT -DIALYSIS Hemodialysis treatment completed without complications. Patient alert and oriented x 4  and VS baseline for patient, tachycardia during tx and some hypotension, cardizem given for tachycardia, MD aware  /57  P 57   
 
 0.5 Kgs removed. Needles X2 removed from access and manual pressure held until hemostasis complete and pressure dressing applied. Meds Given: Cardizem Patient to room 729 after dialysis.

## 2018-09-28 NOTE — PROGRESS NOTES
Hospitalist Progress Note 2018 Admit Date: 2018  5:11 PM  
NAME: Zach Leos :  3/14/1933 MRN:  [id number] Attending: Melida Jiménez, * PCP:  Kade Escamilla NP 
 
SUBJECTIVE:  
 
Pt is a 81 yo female with PMH of ESRD on HD (MWF), CAD, HTN, afib, aortic stenosis, CAD who presented to ER  with c/o left hip pain after sliding out of her chair and landing on the floor. She was found to have an acute mildly displaced left proximal femoral intertrochanteric fracture. She was found to be hyperkalemic with K 7.2 despite having a full session of dialysis .    K 6.4. Had 6 beat run of Vtach . She had an additional run of HD and hyperkalemia resolved. Surgery delayed due to this but was preformed  by Guy. Pt did well, hemodynamically stable. K WNL. Post op no arrhythmias noted. Her SBP tends to run 90s at times. Pt has had post op nausea persisting today. She reports unable to keep solids down. She has an ileostomy with normal appearing output. Review of Systems negative with exception of pertinent positives noted above PHYSICAL EXAM  
 
Visit Vitals  BP 96/56 (BP 1 Location: Left arm, BP Patient Position: At rest)  Pulse (!) 112  Temp 97.7 °F (36.5 °C)  Resp 16  
 Ht 5' 3\" (1.6 m)  Wt 64.9 kg (143 lb)  SpO2 96%  Breastfeeding No  
 BMI 25.33 kg/m2 Temp (24hrs), Av.1 °F (36.7 °C), Min:97.7 °F (36.5 °C), Max:98.6 °F (37 °C) Oxygen Therapy O2 Sat (%): 96 % (18 1140) Pulse via Oximetry: 81 beats per minute (18 042) O2 Device: Nasal cannula (18) O2 Flow Rate (L/min): 1 l/min (18) Intake/Output Summary (Last 24 hours) at 18 1146 Last data filed at 18 1035 Gross per 24 hour Intake                0 ml Output              800 ml Net             -800 ml General: No acute distress   
Lungs:  CTA Bilaterally. Heart:  Regular rate and rhythm,  No murmur, rub, or gallop Abdomen: Soft, Non distended, Non tender, Positive bowel sounds Extremities: No cyanosis, clubbing or edema Neurologic:  No focal deficits ASSESSMENT Active Hospital Problems Diagnosis Date Noted  Fall 09/24/2018  Closed fracture of left hip ([facility] 09/24/2018  Fracture, hip ([facility] 09/24/2018  Hypertension, benign 01/19/2016  CAD (coronary artery disease) 01/19/2016 ASCAD - 50% LAD and RCA by cath 2 years ago  Atrial fibrillation, chronic ([facility] 01/14/2016  Dyslipidemia 06/26/2013  Chronic anemia 06/26/2013  ESRD (end stage renal disease) on dialysis ([facility] 05/16/2013 A/P: 
  
Left hip fx- POD 2 s/p intramedullary nail insertion. Cont PT/OT, pain management, DVT prophylaxis per ortho Nausea- Uncertain etiology, thought to be r/t anesthesia and hopefully will resolve soon. For now will schedule Zofran with meals. Hypotension- Asymptomatic. Bolus 250 cc now. If persists consider start of Midodrine. ESRD- Nephro following for MWF HD. A Fib- Rate controlled. Cards following, cont Cardizem, Lopressor. Remain on remote tele due to NSVTach pre op. DC planning- [facility]ful to [facility] 08/29 Signed By: Marcial Hraden, RANDALL September 28, 2018

## 2018-09-29 VITALS
WEIGHT: 143 LBS | OXYGEN SATURATION: 95 % | RESPIRATION RATE: 18 BRPM | BODY MASS INDEX: 25.34 KG/M2 | SYSTOLIC BLOOD PRESSURE: 111 MMHG | TEMPERATURE: 97.6 F | DIASTOLIC BLOOD PRESSURE: 61 MMHG | HEIGHT: 63 IN | HEART RATE: 101 BPM

## 2018-09-29 LAB
ANION GAP SERPL CALC-SCNC: 9 MMOL/L (ref 7–16)
BASOPHILS # BLD: 0 K/UL (ref 0–0.2)
BASOPHILS NFR BLD: 0 % (ref 0–2)
BUN SERPL-MCNC: 31 MG/DL (ref 8–23)
CALCIUM SERPL-MCNC: 8.7 MG/DL (ref 8.3–10.4)
CHLORIDE SERPL-SCNC: 96 MMOL/L (ref 98–107)
CO2 SERPL-SCNC: 32 MMOL/L (ref 21–32)
CREAT SERPL-MCNC: 5.21 MG/DL (ref 0.6–1)
DIFFERENTIAL METHOD BLD: ABNORMAL
EOSINOPHIL # BLD: 0 K/UL (ref 0–0.8)
EOSINOPHIL NFR BLD: 1 % (ref 0.5–7.8)
ERYTHROCYTE [DISTWIDTH] IN BLOOD BY AUTOMATED COUNT: 18 %
GLUCOSE BLD STRIP.AUTO-MCNC: 126 MG/DL (ref 65–100)
GLUCOSE BLD STRIP.AUTO-MCNC: 133 MG/DL (ref 65–100)
GLUCOSE SERPL-MCNC: 126 MG/DL (ref 65–100)
HCT VFR BLD AUTO: 28.3 % (ref 35.8–46.3)
HGB BLD-MCNC: 9.1 G/DL (ref 11.7–15.4)
IMM GRANULOCYTES # BLD: 0.1 K/UL (ref 0–0.5)
IMM GRANULOCYTES NFR BLD AUTO: 2 % (ref 0–5)
LYMPHOCYTES # BLD: 1.2 K/UL (ref 0.5–4.6)
LYMPHOCYTES NFR BLD: 18 % (ref 13–44)
MCH RBC QN AUTO: 33.8 PG (ref 26.1–32.9)
MCHC RBC AUTO-ENTMCNC: 32.2 G/DL (ref 31.4–35)
MCV RBC AUTO: 105.2 FL (ref 79.6–97.8)
MONOCYTES # BLD: 0.7 K/UL (ref 0.1–1.3)
MONOCYTES NFR BLD: 11 % (ref 4–12)
NEUTS SEG # BLD: 4.5 K/UL (ref 1.7–8.2)
NEUTS SEG NFR BLD: 69 % (ref 43–78)
NRBC # BLD: 0.03 K/UL (ref 0–0.2)
PLATELET # BLD AUTO: 227 K/UL (ref 150–450)
PMV BLD AUTO: 10.6 FL (ref 9.4–12.3)
POTASSIUM SERPL-SCNC: 4.4 MMOL/L (ref 3.5–5.1)
RBC # BLD AUTO: 2.69 M/UL (ref 4.05–5.2)
SODIUM SERPL-SCNC: 137 MMOL/L (ref 136–145)
WBC # BLD AUTO: 6.6 K/UL (ref 4.3–11.1)

## 2018-09-29 PROCEDURE — 74011250636 HC RX REV CODE- 250/636: Performed by: NURSE PRACTITIONER

## 2018-09-29 PROCEDURE — 74011250637 HC RX REV CODE- 250/637: Performed by: INTERNAL MEDICINE

## 2018-09-29 PROCEDURE — 97530 THERAPEUTIC ACTIVITIES: CPT

## 2018-09-29 PROCEDURE — 74011000250 HC RX REV CODE- 250: Performed by: INTERNAL MEDICINE

## 2018-09-29 PROCEDURE — 74011250637 HC RX REV CODE- 250/637: Performed by: NURSE PRACTITIONER

## 2018-09-29 PROCEDURE — 85025 COMPLETE CBC W/AUTO DIFF WBC: CPT

## 2018-09-29 PROCEDURE — 80048 BASIC METABOLIC PNL TOTAL CA: CPT

## 2018-09-29 PROCEDURE — C9113 INJ PANTOPRAZOLE SODIUM, VIA: HCPCS | Performed by: INTERNAL MEDICINE

## 2018-09-29 PROCEDURE — 82962 GLUCOSE BLOOD TEST: CPT

## 2018-09-29 PROCEDURE — 36415 COLL VENOUS BLD VENIPUNCTURE: CPT

## 2018-09-29 PROCEDURE — 74011250636 HC RX REV CODE- 250/636: Performed by: INTERNAL MEDICINE

## 2018-09-29 PROCEDURE — 97110 THERAPEUTIC EXERCISES: CPT

## 2018-09-29 RX ORDER — FERROUS SULFATE, DRIED 160(50) MG
1 TABLET, EXTENDED RELEASE ORAL
Qty: 180 TAB | Refills: 1 | Status: SHIPPED
Start: 2018-09-29

## 2018-09-29 RX ORDER — OXYCODONE HYDROCHLORIDE 5 MG/1
5 TABLET ORAL
Qty: 30 TAB | Refills: 0 | Status: SHIPPED | OUTPATIENT
Start: 2018-09-29

## 2018-09-29 RX ORDER — HEPARIN SODIUM 5000 [USP'U]/ML
5000 INJECTION, SOLUTION INTRAVENOUS; SUBCUTANEOUS EVERY 12 HOURS
Qty: 100 ML | Refills: 0 | Status: SHIPPED
Start: 2018-09-29

## 2018-09-29 RX ORDER — TRAMADOL HYDROCHLORIDE 50 MG/1
50 TABLET ORAL
Qty: 30 TAB | Refills: 0 | Status: SHIPPED | OUTPATIENT
Start: 2018-09-29

## 2018-09-29 RX ADMIN — METOPROLOL TARTRATE 25 MG: 25 TABLET, FILM COATED ORAL at 09:05

## 2018-09-29 RX ADMIN — SODIUM CHLORIDE 40 MG: 9 INJECTION INTRAMUSCULAR; INTRAVENOUS; SUBCUTANEOUS at 09:07

## 2018-09-29 RX ADMIN — Medication 10 ML: at 05:30

## 2018-09-29 RX ADMIN — DILTIAZEM HYDROCHLORIDE 60 MG: 60 TABLET, FILM COATED ORAL at 09:06

## 2018-09-29 RX ADMIN — SENNOSIDES AND DOCUSATE SODIUM 1 TABLET: 8.6; 5 TABLET ORAL at 09:06

## 2018-09-29 RX ADMIN — CALCIUM CARBONATE 500 MG (1,250 MG)-VITAMIN D3 200 UNIT TABLET 1 TABLET: at 09:05

## 2018-09-29 RX ADMIN — OXYCODONE HYDROCHLORIDE 5 MG: 5 TABLET ORAL at 09:05

## 2018-09-29 RX ADMIN — HEPARIN SODIUM 5000 UNITS: 5000 INJECTION INTRAVENOUS; SUBCUTANEOUS at 12:07

## 2018-09-29 RX ADMIN — DOCUSATE SODIUM 100 MG: 100 CAPSULE, LIQUID FILLED ORAL at 09:05

## 2018-09-29 RX ADMIN — OXYCODONE HYDROCHLORIDE 5 MG: 5 TABLET ORAL at 12:07

## 2018-09-29 RX ADMIN — DILTIAZEM HYDROCHLORIDE 60 MG: 60 TABLET, FILM COATED ORAL at 12:04

## 2018-09-29 RX ADMIN — ONDANSETRON 4 MG: 2 INJECTION INTRAMUSCULAR; INTRAVENOUS at 05:30

## 2018-09-29 RX ADMIN — SEVELAMER CARBONATE 1600 MG: 800 TABLET, FILM COATED ORAL at 09:04

## 2018-09-29 RX ADMIN — ONDANSETRON 4 MG: 2 INJECTION INTRAMUSCULAR; INTRAVENOUS at 12:06

## 2018-09-29 RX ADMIN — HEPARIN SODIUM 5000 UNITS: 5000 INJECTION INTRAVENOUS; SUBCUTANEOUS at 05:29

## 2018-09-29 RX ADMIN — Medication 1 AMPULE: at 09:06

## 2018-09-29 NOTE — ROUTINE PROCESS
TRANSFER - OUT REPORT: 
 
Verbal report given to [address] on Faheem Rowland  being transferred to [facility] for routine progression of care Report consisted of patients Situation, Background, Assessment and  
Recommendations(SBAR). Information from the following report(s) SBAR was reviewed with the receiving nurse. Lines:    
 
Opportunity for questions and clarification was provided. Patient transported with: All belongings and via ambulance transport

## 2018-09-29 NOTE — PROGRESS NOTES
[facility] Progress Note Follow-Up on: ESRD 
 
she complains of nausea with one episode of vomiting this morning, on anti emetic. Chart and labs reviewed. ROS: 
Gen - no fever, no chills CV - no chest pain, no palpitation Lung - no shortness of breath, no cough Abd - + nausea/vomiting, no diarrhea Ext - no edema, + hip pain Exam: 
Vitals:  
 09/28/18 0136 09/28/18 1937 09/28/18 2322 09/29/18 2161 BP: 112/52 94/45 109/59 114/62 Pulse: (!) 110 98 86 (!) 109 Resp: 18 18 18 18 Temp: 98.4 °F (36.9 °C) 98 °F (36.7 °C) 97.4 °F (36.3 °C) 98.2 °F (36.8 °C) SpO2: 98% 91% 94% 93% Weight:      
Height:      
 
 
 
Intake/Output Summary (Last 24 hours) at 09/29/18 1031 Last data filed at 09/29/18 0900 Gross per 24 hour Intake              120 ml Output              700 ml Net             -580 ml Wt Readings from Last 3 Encounters:  
09/24/18 64.9 kg (143 lb)  
09/20/18 63 kg (138 lb 12.8 oz) 09/10/18 66.2 kg (146 lb) GEN - in no distress CV - irregular, no murmur, no rub Lung - clear bilaterally, no wheezes Abd - soft, nontender, +BS Ext - no edema Access- RUE AVF Cannulated Recent Labs  
   09/29/18 
 0548  09/28/18 
 0539  09/27/18 
 0510 WBC  6.6  5.9  5.5 HGB  9.1*  9.2*  9.5* HCT  28.3*  28.1*  29.3*  
PLT  227  215  170 Recent Labs  
   09/29/18 
 0548  09/28/18 
 0539  09/27/18 
 0510 NA  137  133*  138  
K  4.4  4.3  4.4 CL  96*  92*  100 CO2  32  29  27 BUN  31*  46*  28* CREA  5.21*  7.49*  5.25* CA  8.7  8.4  8.1*  
GLU  126*  128*  139* Assessment / Plan: 
Principal Problem: 
  Closed fracture of left hip ([address] (9/24/2018) Active Problems: 
  ESRD (end stage renal disease) on dialysis ([facility] (5/16/2013) Dyslipidemia (6/26/2013) Chronic anemia (6/26/2013) Atrial fibrillation, chronic ([facility] (1/14/2016) Hypertension, benign (1/19/2016) CAD (coronary artery disease) (1/19/2016) Overview: ASCAD - 50% LAD and RCA by cath 2 years ago Fall (9/24/2018) Fracture, hip ([facility] (9/24/2018) 1. ESRD - Outpatient unit  [facility] 
- 
- Next HD Monday 2. Hip fracture - s/p ORIF 9/26 3. Afib

## 2018-09-29 NOTE — DISCHARGE SUMMARY
Physician Discharge Summary     Patient ID:  Niki Parkinson  [id number]  80 y.o.  3/14/1933    Admit date: 9/24/2018    Discharge date: 9-    Diagnosis:  1- Left intertrochantric fracture, s/p nail insertion, stable postop, transferred to rehab  2- Transient postop hypotension, resolved w/ IVF  3- ESRD, on regular dialysis  4- Hx of atrial fibrillation, controlled on CCB and BBL    Hospital course:   80 y.o. female who came to ER due to s/p fall and could not get up. Patient is an ESRD patient on HD for the past 8 years. She had HD today without problems. Today she was having lunch and then slid down from her chair and landed on the floor. It is unclear if she hit her head or lost her consciousness for a short time or not. She landed on her left hip and left side of body. She could not get up. No shortness of breath. No dizziness. No chest pain. No fever. No shaking. No chills. In ER she is found to have fracture of left hip and is being admitted for further management. She has history of chronic AF and used to be on Warfarin. She did not want to be on it and refused that. Her cardiologist is aware of this as per her daughter who is the informant at the bedside. Patient admitted and treated as above. On day of discharge, patient exam was unremarkable and pt was asymptomativ. PCP: Shruthi Collazo NP    Patient Instructions:   Current Discharge Medication List      START taking these medications    Details   calcium-vitamin D (OYSTER SHELL) 500 mg(1,250mg) -200 unit per tablet Take 1 Tab by mouth three (3) times daily (with meals). Qty: 180 Tab, Refills: 1      traMADol (ULTRAM) 50 mg tablet Take 1 Tab by mouth every six (6) hours as needed. Max Daily Amount: 200 mg.   Qty: 30 Tab, Refills: 0    Associated Diagnoses: Fracture, intertrochanteric, left femur, closed, initial encounter ([facility])      oxyCODONE IR (ROXICODONE) 5 mg immediate release tablet Take 1 Tab by mouth every four (4) hours as needed. Max Daily Amount: 30 mg.  Qty: 30 Tab, Refills: 0    Associated Diagnoses: Fracture, intertrochanteric, left femur, closed, initial encounter ([address]      heparin sodium,porcine (HEPARIN, PORCINE,) 5,000 unit/mL injection 1 mL by SubCUTAneous route every twelve (12) hours every twelve (12) hours. Qty: 100 mL, Refills: 0         CONTINUE these medications which have NOT CHANGED    Details   acetaminophen (TYLENOL) 325 mg tablet Take 1 Tab by mouth every six (6) hours as needed. Qty: 40 Tab, Refills: 0      metoprolol tartrate (LOPRESSOR) 50 mg tablet Take 0.5 Tabs by mouth two (2) times a day. Qty: 60 Tab, Refills: 1      dilTIAZem (CARDIZEM) 60 mg tablet Take 1 Tab by mouth Before breakfast, lunch, and dinner. Qty: 90 Tab, Refills: 0      omeprazole (PRILOSEC) 20 mg capsule Take 20 mg by mouth two (2) times a day. esomeprazole (NEXIUM) 20 mg capsule Take  by mouth daily. aspirin delayed-release 81 mg tablet Take  by mouth daily. sevelamer carbonate (RENVELA) 800 mg tab tab Take 1,600 mg by mouth three (3) times daily (with meals). Indications: Renal Osteodystrophy with Hyperphosphatemia         STOP taking these medications       zolpidem (AMBIEN) 10 mg tablet Comments:   Reason for Stopping:               Instructions:     Dialysis regularly    Diet:  Renal    Activity: As tolerated. PT/ OT recommendations. Fall precautions. Condition: Stable    Follow-up with primary physician in 1-2 week. Time 35 min    Please send copy to primary physician.     Signed:  Sheela Hawthorne MD  9/29/2018  9:25 AM

## 2018-09-29 NOTE — PROGRESS NOTES
Problem: Mobility Impaired (Adult and Pediatric) Goal: *Acute Goals and Plan of Care (Insert Text) STG: 
(1.)Ms. Vika Alejo will move from supine to sit and sit to supine  and scoot up and down with MINIMAL ASSIST within 3 treatment day(s). (2.)Ms. Vika Alejo will transfer from bed to chair and chair to bed with MINIMAL ASSIST using the least restrictive device within 3 treatment day(s). (3.)Ms. Vika Alejo will ambulate with MODERATE ASSIST for 15 feet with the least restrictive device within 3 treatment day(s). (4.)Ms. Vika Alejo will perform wheelchair mobility 100 feet with Stand by Assist within 3 days to maximize independence with functional mobility. LTG: 
(1.)Ms. Vika Alejo will move from supine to sit and sit to supine  and scoot up and down in bed with CONTACT GUARD ASSIST within 7 treatment day(s). (2.)Ms. Vika Alejo will transfer from bed to chair and chair to bed with CONTACT GUARD ASSIST using the least restrictive device within 7 treatment day(s). (3.)Ms. Vika Alejo will ambulate with MINIMAL ASSIST for 30 feet with the least restrictive device within 7 treatment day(s). (4.)Ms. Vika Alejo will perform wheelchair mobility 100 feet with Mod I within 7 days to maximize independence with functional mobility. 
 
_______________________________________________________________________________________________ PHYSICAL THERAPY: Daily Note, Treatment Day: 2nd, AM 9/29/2018 INPATIENT: Hospital Day: 6 [place] / [place] / Product Type: Medicare /   
 
NWB LT Hip NAME/AGE/GENDER: Chacorta Lugo is a 80 y.o. female PRIMARY DIAGNOSIS: Fracture, hip ([facility] Fractured Left Hip Closed fracture of left hip (HCC) Closed fracture of left hip ([facility] Procedure(s) (LRB): LEFT FEMUR INSERTION INTRA MEDULLARY NAIL (Left) 3 Days Post-Op ICD-10: Treatment Diagnosis:  
 · Generalized Muscle Weakness (M62.81) · Other lack of cordination (R27.8) · Difficulty in walking, Not elsewhere classified (R26.2) · Other abnormalities of gait and mobility (R26.89) · Repeated Falls (R29.6) · Unspecified Lack of Coordination (R27.9) Precaution/Allergies: 
Adhesive ASSESSMENT:  
 
Ms. Allie Fernández presents supine in bed and agreed to therapy. Pt performed supine to sit with max assist and additional time. Pt stood and transferred to chair using rolling walker and mod/max assist x 2, increased cues for safety and to assist with NWB status. Pt then performed B LE exercises with assist to complete them correctly. Pt does require additional time and cues for safety. Pt was left up in chair with family attending. No real progress this morning. Will continue with POC. This section established at most recent assessment PROBLEM LIST (Impairments causing functional limitations): 1. Decreased Strength 2. Decreased ADL/Functional Activities 3. Decreased Transfer Abilities 4. Decreased Ambulation Ability/Technique 5. Decreased Balance 6. Increased Pain 7. Decreased Activity Tolerance 8. Decreased Pacing Skills 9. Increased Fatigue 10. Decreased Flexibility/Joint Mobility 11. Decreased Knowledge of Precautions 12. Decreased [place] with Home Exercise Program 
 INTERVENTIONS PLANNED: (Benefits and precautions of physical therapy have been discussed with the patient.) 1. Balance Exercise 2. Bed Mobility 3. Family Education 4. Gait Training 5. Home Exercise Program (HEP) 6. Manual Therapy 7. Neuromuscular Re-education/Strengthening 8. Range of Motion (ROM) 9. Therapeutic Activites 10. Therapeutic Exercise/Strengthening 11. Transfer Training 12. Group Therapy TREATMENT PLAN: Frequency/Duration: twice daily for duration of hospital stay Rehabilitation Potential For Stated Goals: Good RECOMMENDED REHABILITATION/EQUIPMENT: (at time of discharge pending progress): Due to the probability of continued deficits (see above) this patient will likely need continued skilled physical therapy after discharge. Equipment:  
? None at this time HISTORY:  
History of Present Injury/Illness (Reason for Referral): Kyra Cortes is a 80 y.o. female who came to ER due to s/p fall and could not get up.  
  
Patient is an ESRD patient on HD for the past 8 years. She had HD today without problems. Today she was having lunch and then slid down from her chair and landed on the floor. It is unclear if she hit her head or lost her consciousness for a short time or not. She landed on her left hip and left side of body. She could not get up.  
  
No shortness of breath. No dizziness. No chest pain. No fever. No shaking. No chills.  
  
In ER she is found to have fracture of left hip and is being admitted for further management.  
  
She has history of chronic AF and used to be on Warfarin. She did not want to be on it and refused that. Her cardiologist is aware of this as per her daughter who is the informant at the bedside. Past Medical History/Comorbidities: Ms. Ariane Rebolledo  has a past medical history of Abdominal pain, chronic, right lower quadrant (5/16/2013); Anticoagulated on Coumadin; Aortic stenosis (06/26/2015); Arthritis; Atrial fibrillation, chronic ([facility] [address] (1/14/2016); CAD (coronary artery disease) (06/2013); Carotid stenosis without Infarct (1/19/2016); Chronic anemia (6/26/2013); Crohn's disease ([facility] [address] Dyslipidemia (6/26/2013); Edema (1/19/2016); ESRD (end stage renal disease) on dialysis ([facility] [address] (05/16/2013); Former smoker; GERD (gastroesophageal reflux disease); Gout (6/26/2013); History of ileostomy (5/16/2013); History of peritonitis (2013); History of sepsis (2009); Hypercholesteremia; Hyperlipidemia (1/19/2016); Hypertension; Hypokalemia (5/16/2013); Hypomagnesemia (6/23/2009); Ileostomy in place [facility]); Intractable nausea and vomiting (5/16/2013);  Leukocytosis (5/16/2013); NSTEMI (non-ST elevated myocardial infarction) [facility] (6/26/2013); Palpitations (1/19/2016); Paroxysmal atrial fibrillation ([facility] (1/19/2016); Paroxysmal tachycardia ([facility] (1/19/2016); S/P AAA repair; Serum lipase elevation (5/16/2013); and Tricuspid regurgitation (EF 55-60%). She also has no past medical history of Adverse effect of anesthesia; Difficult intubation; Malignant hyperthermia due to anesthesia; or Pseudocholinesterase deficiency. Ms. Gloris Lennox  has a past surgical history that includes hx cataract removal (Bilateral, 2005); hx lap cholecystectomy (2013); pr abdomen surgery proc unlisted; hx colectomy (1983); hx breast biopsy (Bilateral,  ); vascular surgery procedure unlist (Left, 2009); vascular surgery procedure unlist; vascular surgery procedure unlist (Right, 2014); hx aaa repair (2013); and colonoscopy (N/A, 10/20/2016). Social History/Living Environment:  
Home Environment: Private residence # Steps to Enter: 0 Wheelchair Ramp: Yes One/Two Story Residence: One story Living Alone: Yes Support Systems: Child(mt) Patient Expects to be Discharged to[de-identified] Rehabilitation facility Current DME Used/Available at Home: Wheelchair, Philippe Mcardle, [address] chair Prior Level of Function/Work/Activity: 
Walker and Wheelchair at baseline Number of Personal Factors/Comorbidities that affect the Plan of Care: 1-2: MODERATE COMPLEXITY EXAMINATION:  
Most Recent Physical Functioning:  
Gross Assessment: 
  
         
  
Posture: 
  
Balance: 
Sitting - Static: Fair (occasional) Sitting - Dynamic: Fair (occasional) Standing - Static: Constant support;Poor Standing - Dynamic : Poor Bed Mobility: 
Supine to Sit: Maximum assistance Scooting: Maximum assistance Interventions: Manual cues; Safety awareness training;Verbal cues Wheelchair Mobility: 
  
Transfers: 
Sit to Stand: Moderate assistance;Assist x2 Stand to Sit: Moderate assistance;Assist x2 Stand Pivot Transfers: Moderate assistance (to max x 2) Gait: 
 Left Side Weight Bearing: Non-weight bearing Body Structures Involved: 1. Heart 2. Digestive Structures 3. Bones 4. Joints 5. Muscles 6. Ligaments Body Functions Affected: 1. Sensory/Pain 2. Cardio 3. Neuromusculoskeletal 
4. Movement Related Activities and Participation Affected: 1. Learning and Applying Knowledge 2. General Tasks and Demands 3. Mobility 4. Self Care 5. Community, Social and [place] Number of elements that affect the Plan of Care: 4+: HIGH COMPLEXITY CLINICAL PRESENTATION:  
Presentation: Evolving clinical presentation with changing clinical characteristics: MODERATE COMPLEXITY CLINICAL DECISION MAKING:  
[facility] AM-PAC 6 Clicks Basic Mobility Inpatient Short Form How much difficulty does the patient currently have. .. Unable A Lot A Little None 1. Turning over in bed (including adjusting bedclothes, sheets and blankets)? [] 1   [] 2   [x] 3   [] 4  
2. Sitting down on and standing up from a chair with arms ( e.g., wheelchair, bedside commode, etc.)   [] 1   [x] 2   [] 3   [] 4  
3. Moving from lying on back to sitting on the side of the bed? [] 1   [x] 2   [] 3   [] 4 How much help from another person does the patient currently need. .. Total A Lot A Little None 4. Moving to and from a bed to a chair (including a wheelchair)? [] 1   [x] 2   [] 3   [] 4  
5. Need to walk in hospital room? [] 1   [x] 2   [] 3   [] 4  
6. Climbing 3-5 steps with a railing? [x] 1   [] 2   [] 3   [] 4  
© 2007, Trustees of [facility], under license to [employer]. All rights reserved Score:  Initial: 12 Most Recent: X (Date: 09/27/18 ) Interpretation of Tool:  Represents activities that are increasingly more difficult (i.e. Bed mobility, Transfers, Gait). Score 24 23 22-20 19-15 14-10 9-7 6 Modifier CH CI CJ CK CL CM CN   
 
? Mobility - Walking and Moving Around:  - CURRENT STATUS: CL - 60%-79% impaired, limited or restricted  - GOAL STATUS: CK - 40%-59% impaired, limited or restricted  - D/C STATUS:  ---------------To be determined--------------- [place] / [place] B / Product Type: Medicare /   
 
Medical Necessity:    
· Patient is expected to demonstrate progress in strength, range of motion, balance, coordination and functional technique to decrease assistance required with functional mobility and activity pacing. Reason for Services/Other Comments: 
· Patient continues to require present interventions due to patient's inability to perform bed mobility, transfer or ambulate without high level of dependence. Emily Stearns Use of outcome tool(s) and clinical judgement create a POC that gives a: Questionable prediction of patient's progress: MODERATE COMPLEXITY  
  
 
 
 
TREATMENT:  
  
Pre-treatment Symptoms/Complaints:  No complaints Pain: Initial:  
   Post Session:    
 
Therapeutic Activity: (  15 minutes): Therapeutic activities including Bed transfers, Chair transfers, Sequencing tasks, and perform mobility with weight bearing precautions to improve mobility, strength, balance and coordination. Required maximal cues for safety, weight bearing precautions, and facilitation of muscle activation to promote static and dynamic balance in sitting and static balance in sitting. More time needed to perform all training during treatment due to her needing encouragement that she is safe. Therapeutic Exercise: (  10 minutes):  Exercises per grid below to improve mobility and strength. Required moderate visual, verbal and manual cues to promote proper body posture and promote proper body mechanics. Progressed range and repetitions as indicated. Date: 
09/27/18 Date: 
9/29/18 Date: Activity/Exercise Parameters Parameters Parameters Seated Heel Raise 1x10/Foot X 10 B Seated Toes Raise 1x10/Foot X 10 B   
 Seated LAQ (AAROM on LT) 1x10/Leg X 15 B AA-L Seated Hip ABD (AAROM on LT) 1x5/Leg X 10 B AA-L Braces/Orthotics/Lines/Etc:  
· O2 Device: Nasal cannula · Bilateral SCDs Treatment/Session Assessment:   
· Response to Treatment:  Tolerated session well. · Interdisciplinary Collaboration:  
o Physical Therapy Assistant 
o Registered Nurse 
o Certified Nursing Assistant/Patient Care Technician · After treatment position/precautions:  
o Up in chair 
o Bed alarm/tab alert on 
o Bed/Chair-wheels locked 
o Call light within reach 
o RN notified 
o Family at bedside · Compliance with Program/Exercises: Will assess as treatment progresses. · Recommendations/Intent for next treatment session: \"Next visit will focus on advancements to more challenging activities\". Total Treatment Duration: PT Patient Time In/Time Out Time In: 6996 Time Out: 1110 Virginie Mckeon, PTA

## 2018-09-29 NOTE — PROGRESS NOTES
ORTH FRACTURE PROGRESS NOTE 2018 Admit Date:  
2018 Post Op day: 2 Days Post-Op Subjective: Kristin Eden PATIENT DOING WELL  
 
PT/OT:  
Gait:  Gait Base of Support: Center of gravity altered, Shift to right Vital Signs:   
Patient Vitals for the past 8 hrs: 
 BP Temp Pulse Resp SpO2  
18 0711 114/62 98.2 °F (36.8 °C) (!) 109 18 93 % Temp (24hrs), Av.1 °F (36.7 °C), Min:97.4 °F (36.3 °C), Max:98.7 °F (37.1 °C) Pain Control:  
Pain Assessment Pain Scale 1: Numeric (0 - 10) Pain Intensity 1: 0 Pain Onset 1: post op Pain Location 1: Hip Pain Orientation 1: Left Pain Description 1: Aching Pain Intervention(s) 1: Medication (see MAR) Meds: 
 
Current Facility-Administered Medications Medication Dose Route Frequency  pantoprazole (PROTONIX) 40 mg in sodium chloride 0.9% 10 mL injection  40 mg IntraVENous DAILY  ondansetron (ZOFRAN) injection 4 mg  4 mg IntraVENous TIDAC  sodium chloride (NS) flush 5-10 mL  5-10 mL IntraVENous Q8H  
 sodium chloride (NS) flush 5-10 mL  5-10 mL IntraVENous PRN  
 acetaminophen (TYLENOL) tablet 650 mg  650 mg Oral Q8H  
 oxyCODONE IR (ROXICODONE) tablet 5 mg  5 mg Oral Q4H PRN  
 ondansetron (ZOFRAN) injection 4 mg  4 mg IntraVENous Q4H PRN  
 docusate sodium (COLACE) capsule 100 mg  100 mg Oral BID  alum-mag hydroxide-simeth (MYLANTA) oral suspension 30 mL  30 mL Oral Q4H PRN  
 calcium-vitamin D (OS-ZELDA) 500 mg-200 unit tablet  1 Tab Oral TID WITH MEALS  
 heparin (porcine) injection 5,000 Units  5,000 Units SubCUTAneous Q8H  
 lidocaine-prilocaine (EMLA) 2.5-2.5 % cream   Topical PRN  
 sodium chloride (NS) flush 5-10 mL  5-10 mL IntraVENous Q8H  
 sodium chloride (NS) flush 5-10 mL  5-10 mL IntraVENous PRN  
 naloxone (NARCAN) injection 0.4 mg  0.4 mg IntraVENous PRN  
 ondansetron (ZOFRAN) injection 4 mg  4 mg IntraVENous Q4H PRN  
  senna-docusate (PERICOLACE) 8.6-50 mg per tablet 1 Tab  1 Tab Oral DAILY  dilTIAZem (CARDIZEM) IR tablet 60 mg  60 mg Oral TIDAC  metoprolol tartrate (LOPRESSOR) tablet 25 mg  25 mg Oral BID  sevelamer carbonate (RENVELA) tab 1,600 mg  1,600 mg Oral TID WITH MEALS  traMADol (ULTRAM) tablet 50 mg  50 mg Oral Q6H PRN  
 alcohol 62% (NOZIN) nasal  1 Ampule  1 Ampule Topical Q12H  
 
 
LAB:   
Recent Labs  
   09/29/18 
 0548 HCT  28.3* HGB  9.1*  
 
 
24 Hour Assessment Issues:   
Oriented Discharge Planning: SNF Transfuse PRBC's:   
 
Assessment & Physician's Comment: 
Dressing is clean, dry, and intact Neurovascular checks within normal limits Principal Problem: 
  Closed fracture of left hip ([facility] [address] [address] (9/24/2018) Active Problems: 
  ESRD (end stage renal disease) on dialysis ([address] (5/16/2013) Dyslipidemia (6/26/2013) Chronic anemia (6/26/2013) Atrial fibrillation, chronic ([address] (1/14/2016) Hypertension, benign (1/19/2016) CAD (coronary artery disease) (1/19/2016) Overview: ASCAD - 50% LAD and RCA by cath 2 years ago Fall (9/24/2018) Fracture, hip ([address] (9/24/2018) Plan:  CONTINUE THERAPYY 
 TOÑITO    
 DC TO SNF IN  
 
Lizzie Avila NP

## 2018-09-29 NOTE — PROGRESS NOTES
Pt D/C to [facility] today for rehab. Daughter at bedside at time of transport. Provided daughter with a list of other facilities owned by [facility] in the event that she would like to request pt transfer to another facility in the future. Daughter first choice was [facility] which is not an option and she is adjusting to going to an alternate facility in the area as she prefers for her Mother to remain in [facility] near her.

## 2018-10-01 ENCOUNTER — PATIENT OUTREACH (OUTPATIENT)
Dept: CASE MANAGEMENT | Age: 83
End: 2018-10-01

## 2018-10-01 NOTE — PROGRESS NOTES
Per [facility] Care patient discharged to [facility] on 09/29/2018. Care Coordinator will schedule follow up call in 21 days post acute discharge to home. This note will not be viewable in [address]

## 2018-10-04 ENCOUNTER — HOSPITAL ENCOUNTER (OUTPATIENT)
Dept: LAB | Age: 83
Discharge: HOME | End: 2018-10-04

## 2018-10-04 LAB
ALBUMIN SERPL-MCNC: 3 G/DL (ref 3.2–4.6)
ALBUMIN/GLOB SERPL: 0.7 {RATIO} (ref 1.2–3.5)
ALP SERPL-CCNC: 81 U/L (ref 50–136)
ALT SERPL-CCNC: 22 U/L (ref 12–65)
ANION GAP SERPL CALC-SCNC: 15 MMOL/L (ref 7–16)
AST SERPL-CCNC: 47 U/L (ref 15–37)
BILIRUB SERPL-MCNC: 0.5 MG/DL (ref 0.2–1.1)
BUN SERPL-MCNC: 57 MG/DL (ref 8–23)
CALCIUM SERPL-MCNC: 8.6 MG/DL (ref 8.3–10.4)
CHLORIDE SERPL-SCNC: 88 MMOL/L (ref 98–107)
CO2 SERPL-SCNC: 27 MMOL/L (ref 21–32)
CREAT SERPL-MCNC: 8.27 MG/DL (ref 0.6–1)
GLOBULIN SER CALC-MCNC: 4.1 G/DL (ref 2.3–3.5)
GLUCOSE SERPL-MCNC: 128 MG/DL (ref 65–100)
POTASSIUM SERPL-SCNC: 4.7 MMOL/L (ref 3.5–5.1)
PROT SERPL-MCNC: 7.1 G/DL (ref 6.3–8.2)
SODIUM SERPL-SCNC: 130 MMOL/L (ref 136–145)

## 2018-10-04 PROCEDURE — 80053 COMPREHEN METABOLIC PANEL: CPT

## 2018-10-24 ENCOUNTER — PATIENT OUTREACH (OUTPATIENT)
Dept: CASE MANAGEMENT | Age: 83
End: 2018-10-24

## 2018-10-24 NOTE — PROGRESS NOTES
Care Coordinator left voicemail message on patient's daughter Mrs. Sergio Lundy # [phone] requesting a return call. Care Coordinator will plan follow up call within 24 hours if no return call received. This note will not be viewable in [address]

## 2018-10-26 ENCOUNTER — PATIENT OUTREACH (OUTPATIENT)
Dept: CASE MANAGEMENT | Age: 83
End: 2018-10-26

## 2018-10-26 NOTE — PROGRESS NOTES
Care Coordinator left voicemail message on patient's daughter Mrs. Elba Parker # [phone] requesting a return call. Care Coordinator will close encounter due to Unable to Reach patient for Transitions of Care Follow Up Call. Care Coordinator will reopen encounter if or when patient or patient's daughter returns call. This note will not be viewable in [address]

## 2023-02-05 NOTE — PROGRESS NOTES
[facility] Follow-Up on:ESRD/Hyperkalemia HPI: Pt seen on HD, ready to go home ROS: 
Denies CP, SOB. Current Facility-Administered Medications Medication Dose Route Frequency  heparin (porcine) 1,000 unit/mL injection 5,000 Units  5,000 Units Hemodialysis DIALYSIS PRN  
 aspirin delayed-release tablet 81 mg  81 mg Oral DAILY  dilTIAZem CD (CARDIZEM CD) capsule 180 mg  180 mg Oral DAILY  pantoprazole (PROTONIX) tablet 40 mg  40 mg Oral ACB  metoprolol tartrate (LOPRESSOR) tablet 50 mg  50 mg Oral BID  sevelamer carbonate (RENVELA) tab 1,600 mg  1,600 mg Oral TID WITH MEALS  zolpidem (AMBIEN) tablet 5 mg  5 mg Oral QHS  sodium chloride (NS) flush 5-10 mL  5-10 mL IntraVENous Q8H  
 sodium chloride (NS) flush 5-10 mL  5-10 mL IntraVENous PRN  
 acetaminophen (TYLENOL) tablet 650 mg  650 mg Oral Q4H PRN  
 ondansetron (ZOFRAN) injection 4 mg  4 mg IntraVENous Q6H PRN  
 magnesium hydroxide (MILK OF MAGNESIA) 400 mg/5 mL oral suspension 30 mL  30 mL Oral DAILY PRN  
 heparin (porcine) injection 5,000 Units  5,000 Units SubCUTAneous Q8H Exam: 
Vitals:  
 09/08/18 0755 09/08/18 0836 09/08/18 0900 09/08/18 0930 BP: (!) 84/48 (!) 87/46 (!) 84/47 (!) 83/50 Pulse: 95 98 (!) 102 (!) 116 Resp:      
Temp:      
SpO2:      
Weight:      
Height:      
 
 
 
Intake/Output Summary (Last 24 hours) at 09/08/18 4239 Last data filed at 09/08/18 7260 Gross per 24 hour Intake                0 ml Output              300 ml Net             -300 ml PE: 
GEN - in no distress CV - regular, no murmur, no rub Lung - clear bilaterally Abd - soft, nontender Ext - no edema Labs Recent Labs  
   09/08/18 
 0650  09/07/18 
 1229 WBC  5.1  12.5* HGB  13.2  15.7* HCT  39.0  46.6*  
PLT  143*  219 Recent Labs  
   09/08/18 
 0650  09/07/18 
 2145  09/07/18 
 1841   09/07/18 
 1229 NA  140   --    --    --   128* K  4.9  4.1  3.4*   < >  6.9*  
 CL  99   --    --    --   88* CO2  26   --    --    --   17* BUN  51*   --    --    --   112* CREA  11.00*   --    --    --   18.10* GLU  103*   --    --    --   115* CA  7.4*   --    --    --   7.8*  
MG  2.0   --    --    --    --   
PHOS  6.1*   --    --    --    --   
 < > = values in this interval not displayed. No results for input(s): PH, PCO2, PO2, PCO2 in the last 72 hours. Problem List: 
Patient Active Problem List  
 Diagnosis Date Noted  Hyperkalemia 09/07/2018  Metabolic acidosis 67/05/8983  ESRD (end stage renal disease) ([facility] [address] 09/07/2018  Pleural effusion, right 07/31/2018  Shortness of breath 07/30/2018  Pulmonary edema 07/30/2018  Edema 01/19/2016  Anemia 01/19/2016  Chronic renal failure 01/19/2016  Hypertension, benign 01/19/2016  Aortic stenosis 01/19/2016  Palpitations 01/19/2016  Carotid stenosis without Infarct 01/19/2016  CAD (coronary artery disease) 01/19/2016  Hyperlipidemia 01/19/2016  Paroxysmal tachycardia ([facility] 01/19/2016  Paroxysmal atrial fibrillation ([facility] 01/19/2016  Atrial fibrillation, chronic ([facility] 01/14/2016  
 NSTEMI (non-ST elevated myocardial infarction) ([facility] 06/26/2013  Dyslipidemia 06/26/2013  Gout 06/26/2013  Chronic anemia 06/26/2013  Peritonitis, dialysis-associated ([facility] 05/17/2013  Symptomatic cholelithiasis 05/17/2013  Intractable nausea and vomiting 05/16/2013  Abdominal pain, chronic, right lower quadrant 05/16/2013  Leukocytosis 05/16/2013  ESRD (end stage renal disease) on dialysis ([address] 05/16/2013  Hypokalemia 05/16/2013  Serum lipase elevation 05/16/2013  History of ileostomy 05/16/2013  Crohn's disease ([address] 07/03/2009  Pleural effusion, left 07/02/2009  Hypomagnesemia 06/23/2009 Issues Addressed By Nephrology: 
 
Plan: 1. ESRD HD 2. Hyperkalemia 3. S/p Fall 4. Colostomy bags intact Stable for discharge home pregnancy

## (undated) DEVICE — 1010 S-DRAPE TOWEL DRAPE 10/BX: Brand: STERI-DRAPE™

## (undated) DEVICE — OCCLUSIVE GAUZE STRIP,3% BISMUTH TRIBROMOPHENATE IN PETROLATUM BLEND: Brand: XEROFORM

## (undated) DEVICE — SLIM BODY SKIN STAPLER: Brand: APPOSE ULC

## (undated) DEVICE — 3M™ TEGADERM™ TRANSPARENT FILM DRESSING FRAME STYLE, 1628, 6 IN X 8 IN (15 CM X 20 CM), 10/CT 8CT/CASE: Brand: 3M™ TEGADERM™

## (undated) DEVICE — ROD RMR L950MM DIA2.5MM W/ EXTN BALL TIP

## (undated) DEVICE — (D)DRAPE ISOL ANTIMC 129X100IN -- DISC BY MFR

## (undated) DEVICE — 3.2MM GUIDE WIRE 400MM

## (undated) DEVICE — GOWN,SIRUS,NONRNF,SETINSLV,XL,20/CS: Brand: MEDLINE

## (undated) DEVICE — AMD ANTIMICROBIAL GAUZE SPONGES,12 PLY USP TYPE VII, 0.2% POLYHEXAMETHYLENE BIGUANIDE HCI (PHMB): Brand: CURITY

## (undated) DEVICE — KIT THORCENT 8FR L5IN POLYUR W/ 18/22/25GA NDL 3 W STPCOCK

## (undated) DEVICE — STERILE POLYISOPRENE POWDER-FREE SURGICAL GLOVES: Brand: PROTEXIS

## (undated) DEVICE — 2000CC GUARDIAN II: Brand: GUARDIAN

## (undated) DEVICE — SOLUTION IV 1000ML 0.9% SOD CHL

## (undated) DEVICE — (D)PREP SKN CHLRAPRP APPL 26ML -- CONVERT TO ITEM 371833

## (undated) DEVICE — INTENDED FOR TISSUE SEPARATION, AND OTHER PROCEDURES THAT REQUIRE A SHARP SURGICAL BLADE TO PUNCTURE OR CUT.: Brand: BARD-PARKER ® STAINLESS STEEL BLADES

## (undated) DEVICE — SURGICAL PROCEDURE PACK BASIC ST FRANCIS

## (undated) DEVICE — SUTURE MCRYL SZ 0 L27IN ABSRB VLT CT-1 L36MM 1/2 CIR TAPR Y340H

## (undated) DEVICE — GEL MEDC ULTRASOUND 5L -- REPLACED BY 326862

## (undated) DEVICE — X-LARGE COTTON GLOVE: Brand: DEROYAL

## (undated) DEVICE — REM POLYHESIVE ADULT PATIENT RETURN ELECTRODE: Brand: VALLEYLAB

## (undated) DEVICE — SUTURE MCRYL SZ 2-0 L27IN ABSRB VLT CT-1 L36MM 1/2 CIR TAPR Y339H